# Patient Record
Sex: FEMALE | Race: WHITE | NOT HISPANIC OR LATINO | Employment: OTHER | URBAN - METROPOLITAN AREA
[De-identification: names, ages, dates, MRNs, and addresses within clinical notes are randomized per-mention and may not be internally consistent; named-entity substitution may affect disease eponyms.]

---

## 2018-01-23 ENCOUNTER — CONVERSION ENCOUNTER (OUTPATIENT)
Dept: GENERAL RADIOLOGY | Facility: HOSPITAL | Age: 58
End: 2018-01-23

## 2018-08-17 ENCOUNTER — OFFICE VISIT CONVERTED (OUTPATIENT)
Dept: ORTHOPEDIC SURGERY | Facility: CLINIC | Age: 58
End: 2018-08-17
Attending: ORTHOPAEDIC SURGERY

## 2018-08-31 ENCOUNTER — OFFICE VISIT CONVERTED (OUTPATIENT)
Dept: ORTHOPEDIC SURGERY | Facility: CLINIC | Age: 58
End: 2018-08-31
Attending: PHYSICIAN ASSISTANT

## 2018-09-12 ENCOUNTER — OFFICE VISIT CONVERTED (OUTPATIENT)
Dept: ORTHOPEDIC SURGERY | Facility: CLINIC | Age: 58
End: 2018-09-12
Attending: PHYSICIAN ASSISTANT

## 2020-01-22 ENCOUNTER — CONVERSION ENCOUNTER (OUTPATIENT)
Dept: SURGERY | Facility: CLINIC | Age: 60
End: 2020-01-22

## 2020-01-22 ENCOUNTER — OFFICE VISIT CONVERTED (OUTPATIENT)
Dept: SURGERY | Facility: CLINIC | Age: 60
End: 2020-01-22
Attending: SURGERY

## 2020-06-29 ENCOUNTER — HOSPITAL ENCOUNTER (OUTPATIENT)
Dept: LAB | Facility: HOSPITAL | Age: 60
Discharge: HOME | End: 2020-06-29
Attending: FAMILY MEDICINE

## 2020-06-29 LAB
ALBUMIN SERPL-MCNC: 4.1 G/DL (ref 3.5–5)
ALBUMIN/GLOB SERPL: 1.5 {RATIO} (ref 1.4–2.6)
ALP SERPL-CCNC: 78 U/L (ref 53–141)
ALT SERPL-CCNC: 39 U/L (ref 10–40)
ANION GAP SERPL CALC-SCNC: 15 MMOL/L (ref 8–19)
AST SERPL-CCNC: 33 U/L (ref 15–50)
BILIRUB SERPL-MCNC: 0.6 MG/DL (ref 0.2–1.3)
BUN SERPL-MCNC: 7 MG/DL (ref 5–25)
BUN/CREAT SERPL: 10 {RATIO} (ref 6–20)
CALCIUM SERPL-MCNC: 9.7 MG/DL (ref 8.7–10.4)
CHLORIDE SERPL-SCNC: 100 MMOL/L (ref 99–111)
CHOLEST SERPL-MCNC: 195 MG/DL (ref 107–200)
CHOLEST/HDLC SERPL: 2.5 {RATIO} (ref 3–6)
CONV CO2: 27 MMOL/L (ref 22–32)
CONV TOTAL PROTEIN: 6.8 G/DL (ref 6.3–8.2)
CREAT UR-MCNC: 0.67 MG/DL (ref 0.5–0.9)
GFR SERPLBLD BASED ON 1.73 SQ M-ARVRAT: >60 ML/MIN/{1.73_M2}
GLOBULIN UR ELPH-MCNC: 2.7 G/DL (ref 2–3.5)
GLUCOSE SERPL-MCNC: 86 MG/DL (ref 65–99)
HDLC SERPL-MCNC: 77 MG/DL (ref 40–60)
LDLC SERPL CALC-MCNC: 106 MG/DL (ref 70–100)
OSMOLALITY SERPL CALC.SUM OF ELEC: 283 MOSM/KG (ref 273–304)
POTASSIUM SERPL-SCNC: 4.1 MMOL/L (ref 3.5–5.3)
SODIUM SERPL-SCNC: 138 MMOL/L (ref 135–147)
TRIGL SERPL-MCNC: 59 MG/DL (ref 40–150)
VLDLC SERPL-MCNC: 12 MG/DL (ref 5–37)

## 2020-07-29 ENCOUNTER — HOSPITAL ENCOUNTER (OUTPATIENT)
Dept: URGENT CARE | Facility: CLINIC | Age: 60
Discharge: HOME | End: 2020-07-29

## 2020-07-31 LAB — SARS-COV-2 RNA SPEC QL NAA+PROBE: NOT DETECTED

## 2021-04-12 ENCOUNTER — HOSPITAL ENCOUNTER (OUTPATIENT)
Dept: VACCINE CLINIC | Facility: HOSPITAL | Age: 61
Discharge: HOME | End: 2021-04-12
Attending: INTERNAL MEDICINE

## 2021-05-03 ENCOUNTER — HOSPITAL ENCOUNTER (OUTPATIENT)
Dept: GENERAL RADIOLOGY | Facility: HOSPITAL | Age: 61
Discharge: HOME | End: 2021-05-03
Attending: FAMILY MEDICINE

## 2021-05-05 ENCOUNTER — OFFICE VISIT CONVERTED (OUTPATIENT)
Dept: FAMILY MEDICINE CLINIC | Facility: CLINIC | Age: 61
End: 2021-05-05
Attending: PHYSICIAN ASSISTANT

## 2021-05-05 ENCOUNTER — CONVERSION ENCOUNTER (OUTPATIENT)
Dept: FAMILY MEDICINE CLINIC | Facility: CLINIC | Age: 61
End: 2021-05-05

## 2021-05-05 LAB
AMPHET UR QL CFM: NEGATIVE
BARBITURATES UR QL: NEGATIVE
BENZODIAZ UR QL SCN: NEGATIVE
CONV AMP/METHAMP UR: NEGATIVE
CONV COCAINE, UR: NEGATIVE
MDMA UR QL SCN: NEGATIVE
METHADONE UR QL SCN: NEGATIVE
OPIATES UR QL SCN: NEGATIVE
OXYCODONE UR QL SCN: NEGATIVE
PCP UR QL: NEGATIVE
THC SERPLBLD CFM-MCNC: NEGATIVE NG/ML

## 2021-05-11 ENCOUNTER — HOSPITAL ENCOUNTER (OUTPATIENT)
Dept: MAMMOGRAPHY | Facility: HOSPITAL | Age: 61
Discharge: HOME | End: 2021-05-11
Attending: PHYSICIAN ASSISTANT

## 2021-05-14 ENCOUNTER — HOSPITAL ENCOUNTER (OUTPATIENT)
Dept: GENERAL RADIOLOGY | Facility: HOSPITAL | Age: 61
Discharge: HOME | End: 2021-05-14
Attending: PHYSICIAN ASSISTANT

## 2021-05-14 ENCOUNTER — HOSPITAL ENCOUNTER (OUTPATIENT)
Dept: LAB | Facility: HOSPITAL | Age: 61
Discharge: HOME | End: 2021-05-14
Attending: PHYSICIAN ASSISTANT

## 2021-05-14 LAB
25(OH)D3 SERPL-MCNC: 41.6 NG/ML (ref 30–100)
ALBUMIN SERPL-MCNC: 4.2 G/DL (ref 3.5–5)
ALBUMIN/GLOB SERPL: 1.1 {RATIO} (ref 1.4–2.6)
ALP SERPL-CCNC: 96 U/L (ref 53–141)
ALT SERPL-CCNC: 27 U/L (ref 10–40)
ANION GAP SERPL CALC-SCNC: 14 MMOL/L (ref 8–19)
AST SERPL-CCNC: 36 U/L (ref 15–50)
BASOPHILS # BLD AUTO: 0.05 10*3/UL (ref 0–0.2)
BASOPHILS NFR BLD AUTO: 1 % (ref 0–3)
BILIRUB SERPL-MCNC: 1.13 MG/DL (ref 0.2–1.3)
BUN SERPL-MCNC: 7 MG/DL (ref 5–25)
BUN/CREAT SERPL: 10 {RATIO} (ref 6–20)
CALCIUM SERPL-MCNC: 9.2 MG/DL (ref 8.7–10.4)
CHLORIDE SERPL-SCNC: 97 MMOL/L (ref 99–111)
CHOLEST SERPL-MCNC: 222 MG/DL (ref 107–200)
CHOLEST/HDLC SERPL: 2.1 {RATIO} (ref 3–6)
CONV ABS IMM GRAN: 0.01 10*3/UL (ref 0–0.2)
CONV CO2: 26 MMOL/L (ref 22–32)
CONV IMMATURE GRAN: 0.2 % (ref 0–1.8)
CONV TOTAL PROTEIN: 8 G/DL (ref 6.3–8.2)
CREAT UR-MCNC: 0.7 MG/DL (ref 0.5–0.9)
DEPRECATED RDW RBC AUTO: 48.4 FL (ref 36.4–46.3)
EOSINOPHIL # BLD AUTO: 0.05 10*3/UL (ref 0–0.7)
EOSINOPHIL # BLD AUTO: 1 % (ref 0–7)
ERYTHROCYTE [DISTWIDTH] IN BLOOD BY AUTOMATED COUNT: 14.4 % (ref 11.7–14.4)
GFR SERPLBLD BASED ON 1.73 SQ M-ARVRAT: >60 ML/MIN/{1.73_M2}
GLOBULIN UR ELPH-MCNC: 3.8 G/DL (ref 2–3.5)
GLUCOSE SERPL-MCNC: 79 MG/DL (ref 65–99)
HCT VFR BLD AUTO: 44.2 % (ref 37–47)
HDLC SERPL-MCNC: 108 MG/DL (ref 40–60)
HGB BLD-MCNC: 14.8 G/DL (ref 12–16)
LDLC SERPL CALC-MCNC: 101 MG/DL (ref 70–100)
LYMPHOCYTES # BLD AUTO: 1.88 10*3/UL (ref 1–5)
LYMPHOCYTES NFR BLD AUTO: 37.2 % (ref 20–45)
MCH RBC QN AUTO: 30.6 PG (ref 27–31)
MCHC RBC AUTO-ENTMCNC: 33.5 G/DL (ref 33–37)
MCV RBC AUTO: 91.5 FL (ref 81–99)
MONOCYTES # BLD AUTO: 0.73 10*3/UL (ref 0.2–1.2)
MONOCYTES NFR BLD AUTO: 14.5 % (ref 3–10)
NEUTROPHILS # BLD AUTO: 2.33 10*3/UL (ref 2–8)
NEUTROPHILS NFR BLD AUTO: 46.1 % (ref 30–85)
NRBC CBCN: 0 % (ref 0–0.7)
OSMOLALITY SERPL CALC.SUM OF ELEC: 273 MOSM/KG (ref 273–304)
PLATELET # BLD AUTO: 160 10*3/UL (ref 130–400)
PMV BLD AUTO: 12.8 FL (ref 9.4–12.3)
POTASSIUM SERPL-SCNC: 3.9 MMOL/L (ref 3.5–5.3)
RBC # BLD AUTO: 4.83 10*6/UL (ref 4.2–5.4)
SODIUM SERPL-SCNC: 133 MMOL/L (ref 135–147)
TRIGL SERPL-MCNC: 63 MG/DL (ref 40–150)
TSH SERPL-ACNC: 1.33 M[IU]/L (ref 0.27–4.2)
VLDLC SERPL-MCNC: 13 MG/DL (ref 5–37)
WBC # BLD AUTO: 5.05 10*3/UL (ref 4.8–10.8)

## 2021-05-15 VITALS — WEIGHT: 140.37 LBS | HEIGHT: 67 IN | BODY MASS INDEX: 22.03 KG/M2 | RESPIRATION RATE: 12 BRPM

## 2021-05-16 VITALS — BODY MASS INDEX: 22.6 KG/M2 | HEART RATE: 76 BPM | HEIGHT: 67 IN | OXYGEN SATURATION: 98 % | WEIGHT: 144 LBS

## 2021-05-16 VITALS — BODY MASS INDEX: 22.6 KG/M2 | WEIGHT: 144 LBS | OXYGEN SATURATION: 95 % | HEART RATE: 62 BPM | HEIGHT: 67 IN

## 2021-05-16 VITALS — OXYGEN SATURATION: 97 % | HEIGHT: 67 IN | WEIGHT: 142.5 LBS | BODY MASS INDEX: 22.37 KG/M2 | HEART RATE: 43 BPM

## 2021-05-22 ENCOUNTER — TRANSCRIBE ORDERS (OUTPATIENT)
Dept: FAMILY MEDICINE CLINIC | Facility: CLINIC | Age: 61
End: 2021-05-22

## 2021-05-22 DIAGNOSIS — Z78.0 POST-MENOPAUSAL: Primary | ICD-10-CM

## 2021-06-05 NOTE — H&P
"   History and Physical      Patient Name: Danielle Manuel   Patient ID: [id number]   Sex: Female   YOB: 1960    Primary Care Provider: Phyllis LOCK   Referring Provider: Phyllis LOCK    Visit Date: May 5, 2021    Provider: HAYDE Sprague   [place] [facility] [place] [address] [place] [address], [address]  [place]  [id number]   Location Phone: [phone]          Chief Complaint  · New Patient-Establish Care   · Medication Refills       History Of Present Illness  Danielle Manuel is a 60 year old /White female who presents for evaluation and treatment of:      New patient to establish care, previous PCP Dr Pratt     Anxiety/Depression: She takes Celexa 40mg qd with good results, she also has Ativan and Klonopin prn. She were given both by Dr. Pratt. She takes Klonopin for \"bad\" night; feels lonely, difficulty sleeping; lives alone,   about 10 years ago and she is still struggling. She states she is a very anxious person     HL: She is taking Simvastatin 20mg qd and states she is due for labs. Her last set was 2020. She denies leg cramps/pain and muscle weakness. She ran out of meds last 2020 and has not had medication since.    Insomnia: She states she has Trazodone prn and states it works well.    Chronic Pain: She states she has Tramadol 50mg for her wide spread pain due to a lot of broken bones over her lifetime. Tried NSAIDs and sometimes works but occasionally doesn't. States that she has back pain but has never had evaluation.    OAB: on Myrbetriq; doing well without complaints    MM/3/21  no recent DEXA; has had several bone fractured (moriah wrist, elbow, ankle); took Fosamax but didn't like it and won't take.  Cologuard: 2019 and normal.           Past Medical History  Disease Name Date Onset Notes   Hyperlipemia --  --          Past Surgical History  Procedure Name Date Notes "   Appendectomy --  --    Colonoscopy --  --    Hysterectomy --  --    Metal implants --  --    Tonsilectomy --  --          Medication List  Name Date Started Instructions   Ativan 0.5 mg oral tablet  take 1 tablet by oral route As needed   Celexa 40 mg oral tablet  take 1 tablet (40 mg) by oral route once daily   Klonopin 2 mg oral tablet  take 1 tablet by oral route As needed   Myrbetriq 50 mg oral tablet extended release 24 hr  take 1 tablet (50 mg) by oral route once daily swallowing whole with water. Do not crush, chew and/or divide.   simvastatin 20 mg oral tablet  take 1 tablet (20 mg) by oral route once daily in the evening   tramadol 50 mg oral tablet  take 1 tablet by oral route As needed   trazodone 50 mg oral tablet  take 1 tablet (50 mg) by oral route once daily at bedtime         Allergy List  Allergen Name Date Reaction Notes   PENICILLINS --  --  --        Allergies Reconciled  Family Medical History  Disease Name Relative/Age Notes   Family history of certain chronic disabling diseases; arthritis Mother/   Mother   Family history of pancreatic cancer  --    Family history of heart disease Father/   --    Family history of diabetes mellitus  --          Social History  Finding Status Start/Stop Quantity Notes   Alcohol Use Current some day --/-- --  occasionally drinks, less than 1 drink per day, has been drinking for 21-30 years   lives alone --  --/-- --  --    Recreational Drug Use Never --/-- --  no   Tobacco Current every day --/-- 1 PPD current every day smoker, 1 packs per day, smoked 21-30 years    --  --/-- --  --    Working --  --/-- --  --          Review of Systems  · Constitutional  o Denies  o : fatigue, night sweats  · Eyes  o Denies  o : double vision, blurred vision  · HENT  o Denies  o : vertigo, recent head injury  · Breasts  o Denies  o : abnormal changes in breast size, additional breast symptoms except as noted in the HPI  · Cardiovascular  o Denies  o : chest pain,  "irregular heart beats  · Respiratory  o Denies  o : shortness of breath, productive cough  · Gastrointestinal  o Denies  o : nausea, vomiting  · Genitourinary  o Denies  o : dysuria, urinary retention  · Integument  o Denies  o : hair growth change, new skin lesions  · Neurologic  o Denies  o : altered mental status, seizures  · Musculoskeletal  o Denies  o : joint swelling, limitation of motion  · Endocrine  o Denies  o : cold intolerance, heat intolerance  · Psychiatric  o Admits  o : anxiety  · Heme-Lymph  o Denies  o : petechiae, lymph node enlargement or tenderness  · Allergic-Immunologic  o Denies  o : frequent illnesses      Vitals  Date Time BP Position Site L\R Cuff Size HR RR TEMP (F) WT  HT  BMI kg/m2 BSA m2 O2 Sat FR L/min FiO2        05/05/2021 10:32 /66 Sitting    72 - R  97 146lbs 16oz 5'  6\" 23.73 1.76 96 %  21%          Physical Examination  · Constitutional  o Appearance  o : well developed, well-nourished, no acute distress  · Head and Face  o Head  o : normocephalic, atraumatic  · Neck  o Inspection/Palpation  o : normal appearance, no masses or tenderness, trachea midline  o Thyroid  o : gland size normal, nontender, no nodules or masses present on palpation  · Respiratory  o Respiratory Effort  o : breathing unlabored  o Inspection of Chest  o : chest rise symmetric bilaterally  o Auscultation of Lungs  o : clear to auscultation bilaterally throughout inspiration and expiration  · Cardiovascular  o Heart  o :   § Auscultation of Heart  § : regular rate and rhythm, no murmurs, gallops or rubs  o Peripheral Vascular System  o :   § Extremities  § : no edema  · Lymphatic  o Neck  o : no cervical lymphadenopathy, no supraclavicular lymphadenopathy  · Psychiatric  o Mood and Affect  o : mood normal, affect appropriate          Results  · In-Office Procedures  o Lab procedure  § IOP - Urine Drug Screen In-House [facility] (41635)   § Amphetamines Ur Ql: Negative   § Barbiturates Ur Ql: Negative "   § Buprenorphine+Nor Ur Ql Scn: Negative   § Benzodiaz Ur Ql: Negative   § Cocaine Ur Ql: Negative   § Methadone Ur Ql: Negative   § Methamphet Ur Ql: Negative   § MDMA Ur Ql Scn: Negative   § Opiates Ur Ql: Negative   § Oxycodone Ur Ql: Negative   § PCP Ur Ql: Negative   § THC Ur Ql: Negative   § Temp in Range?: Within/Acceptable   § Control Seen?: Yes       Assessment  · Screening for depression     V79.0/Z13.89  · Post menopausal syndrome     V49.81/Z78.0  · Anxiety disorder     300.00/F41.9  · Hyperlipidemia     272.4/E78.5  · Insomnia, unspecified     780.52/G47.00  · Vitamin D deficiency     268.9/E55.9  · Back pain     724.5/M54.9  · Medication management     V58.69/Z79.899  · Screening for osteoporosis     V82.81/Z13.820  · Patient receiving medication management services from refill clinic     V68.1/Z76.0  · Establishing care with new doctor, encounter for     V65.8/Z76.89  · Chronic pain     338.29/G89.29  · OAB (overactive bladder)     596.51/N32.81       Order dexa.  Ayla and UDS completed and reviewed.  Discussed decreasing Klonopin to 1mg qhs prn; will task Dr. Nolen for refill.  Continue prn Tramadol; will task Dr. Nolen for refill.  F/u 3 mths.     Problems Reconciled  Plan  · Orders  o DEXA Bone Density, 1 or more sites, axial skeleton [facility] (44556) - V49.81/Z78.0 - 05/05/2021  o Physical, Primary Care Panel (CBC, CMP, Lipid, TSH) [facility] (05412, 96934, 46362, 43452) - 272.4/E78.5, 268.9/E55.9, V58.69/Z79.899 - 05/05/2021  o Vitamin D (25-Hydroxy) Level (07879) - 268.9/E55.9 - 05/05/2021  o AYLA Report (KASPR) - - 05/05/2021  o ACO-18: Positive screen for clinical depression using a standardized tool and a follow-up plan documented () - - 05/05/2021   Patient states stable on medication.  o ACO-20: Screening Mammography documented and reviewed [facility] () - - 05/05/2021  o ACO-39: Current medications updated and reviewed (1159F, ) - - 05/05/2021  o Thoracic Spine (3 view) [facility] Preferred  View (75892) - 724.5/M54.9 - 05/05/2021  · Medications  o Celexa 40 mg oral tablet   SIG: take 1 tablet (40 mg) by oral route once daily for 90 days   DISP: (90) Tablet with 1 refills  Prescribed on 05/05/2021     o Myrbetriq 50 mg oral tablet extended release 24 hr   SIG: take 1 tablet (50 mg) by oral route once daily swallowing whole with water. Do not crush, chew and/or divide. for 90 days   DISP: (90) Tablet with 1 refills  Prescribed on 05/05/2021     o simvastatin 20 mg oral tablet   SIG: take 1 tablet (20 mg) by oral route once daily in the evening for 90 days   DISP: (90) Tablet with 1 refills  Prescribed on 05/05/2021     o tramadol 50 mg oral tablet   SIG: take 1 tablet by oral route daily as needed for 30 days   DISP: (30) Tablet with 2 refills  Prescribed on 05/05/2021     o trazodone 50 mg oral tablet   SIG: take 1 tablet (50 mg) by oral route once daily at bedtime for 90 days   DISP: (90) Tablet with 0 refills  Prescribed on 05/05/2021     o Klonopin 2 mg oral tablet   SIG: take 1 tablet by oral route once a day (in the evening) as needed for 30 days   DISP: (30) Tablet with 2 refills  Adjusted on 05/05/2021     o Ativan 0.5 mg oral tablet   SIG: take 1 tablet by oral route As needed   DISP: (0) Tablet with 0 refills  Discontinued on 05/05/2021     o Medications have been Reconciled  o Transition of Care or Provider Policy  · Instructions  o Depression Screen completed and scanned into the EMR under the designated folder within the patient's documents.  o Today's PHQ-9 result is 7  o Stop taking calcium supplements for at least 48 hours prior to your exam. Failure to stop supplements could alter results, and the radiologists will require you to reschedule your test.  o Patient was given an SSRI/SSNRI medication and warned of possible side effects of the medication including potential for increased risk of suicidal thoughts and feelings. Patient was instructed that if they begin to exhibit any of these  effects they will discontinue the medication immediately and contact our office or the ER ASAP.  o Advised that cheeses and other sources of dairy fats, animal fats, fast food, and the extras (candy, pastries, pies, doughnuts and cookies) all contain LDL raising nutrients. Advised to increase fruits, vegetables, whole grains, and to monitor portion sizes.   o Avoid any electronic use for at least 30 minutes prior to bed time. Cell phone screens, tablets and TVs imitate daylight, so your brain can become confused on the time of day. No caffeine use in the late afternoon and evenings.  o Obtained a written consent for AYLA query. Discussed the risk and benefits of the use of controlled substances with the patient, including the risk of tolerance and drug dependence. The patient has been counseled on the need to have an exit strategy, including potentially discontinuing the use of controlled substances. AYLA has or will be reviewed as soon as it becomes avaliable.  o See note for indication of controlled substance. Ayla and drug screen have been reviewed. Controlled substance agreement signed and scanned into chart. After discussion of risks and benefits of medication, I have determined patient is suitable for Rx while demonstrating the ability to safely follow and administer the medication plan. Patient understands the expectation that medication directions cannot be adjusted without a provider's written approval.   o Patient is taking medications as prescribed and doing well.   o Patient was educated/instructed on their diagnosis, treatment and medications prior to discharge from the clinic today.  o Call the office with any concerns or questions.  o Electronically Identified Patient Education Materials Provided Electronically  · Disposition  o Follow Up 3 months.  · Associate Tasks  o Task ID [id number] ''''Provider to Provider ONLY Controlled Substance Request: Tramadol and Klonopin  Refill            Electronically Signed by: HAYDE Sprague -Author on May 5, 2021 04:02:24 PM

## 2021-06-07 ENCOUNTER — APPOINTMENT (OUTPATIENT)
Dept: BONE DENSITY | Facility: HOSPITAL | Age: 61
End: 2021-06-07

## 2021-06-16 ENCOUNTER — TELEPHONE (OUTPATIENT)
Dept: NEUROSURGERY | Facility: CLINIC | Age: 61
End: 2021-06-16

## 2021-06-16 NOTE — TELEPHONE ENCOUNTER
----- Message from DREW Chau sent at 6/15/2021  5:35 PM EDT -----  Regarding: RE: New Referral  Please find out how old the fracture is or when the XR was done. Priya Flynn  ----- Message -----  From: Purnima Ventura RegSched Rep  Sent: 6/15/2021   3:45 PM EDT  To: DREW Chau  Subject: New Referral                                     We received a referral in  on this patient.  Dx is compression fx.  Pt had x-ray but no MRI/CT.  Ok to schedule or do you prefer pt has scan prior?

## 2021-06-18 ENCOUNTER — TELEPHONE (OUTPATIENT)
Dept: NEUROSURGERY | Facility: CLINIC | Age: 61
End: 2021-06-18

## 2021-07-15 VITALS
DIASTOLIC BLOOD PRESSURE: 66 MMHG | BODY MASS INDEX: 23.63 KG/M2 | SYSTOLIC BLOOD PRESSURE: 110 MMHG | OXYGEN SATURATION: 96 % | HEIGHT: 66 IN | TEMPERATURE: 97 F | WEIGHT: 147 LBS | HEART RATE: 72 BPM

## 2021-07-26 ENCOUNTER — OFFICE VISIT (OUTPATIENT)
Dept: NEUROSURGERY | Facility: CLINIC | Age: 61
End: 2021-07-26

## 2021-07-26 VITALS
TEMPERATURE: 97.3 F | WEIGHT: 138.9 LBS | HEIGHT: 66 IN | BODY MASS INDEX: 22.32 KG/M2 | HEART RATE: 98 BPM | OXYGEN SATURATION: 100 % | SYSTOLIC BLOOD PRESSURE: 128 MMHG | DIASTOLIC BLOOD PRESSURE: 89 MMHG

## 2021-07-26 DIAGNOSIS — S22.000A THORACIC COMPRESSION FRACTURE, CLOSED, INITIAL ENCOUNTER (HCC): Primary | ICD-10-CM

## 2021-07-26 DIAGNOSIS — M47.24 THORACIC RADICULOPATHY DUE TO OSTEOARTHRITIS OF SPINE: ICD-10-CM

## 2021-07-26 DIAGNOSIS — M80.08XG PATHOLOGICAL FRACTURE OF VERTEBRA DUE TO AGE-RELATED OSTEOPOROSIS WITH DELAYED HEALING, SUBSEQUENT ENCOUNTER: ICD-10-CM

## 2021-07-26 PROCEDURE — 99204 OFFICE O/P NEW MOD 45 MIN: CPT | Performed by: NURSE PRACTITIONER

## 2021-07-26 RX ORDER — CHLORAL HYDRATE 500 MG
1000 CAPSULE ORAL
COMMUNITY

## 2021-07-26 RX ORDER — TRAZODONE HYDROCHLORIDE 50 MG/1
TABLET ORAL
COMMUNITY
Start: 2021-07-11 | End: 2021-08-04 | Stop reason: SDUPTHER

## 2021-07-26 RX ORDER — GABAPENTIN 300 MG/1
300 CAPSULE ORAL 3 TIMES DAILY
Qty: 90 CAPSULE | Refills: 1 | Status: SHIPPED | OUTPATIENT
Start: 2021-07-26

## 2021-07-26 RX ORDER — ALENDRONATE SODIUM 70 MG/1
TABLET ORAL
COMMUNITY
Start: 2021-07-25 | End: 2021-10-15 | Stop reason: SDUPTHER

## 2021-07-26 RX ORDER — CLOBETASOL PROPIONATE 0.5 MG/G
OINTMENT TOPICAL
COMMUNITY
Start: 2021-07-14 | End: 2021-10-15

## 2021-07-26 RX ORDER — SIMVASTATIN 20 MG
TABLET ORAL
COMMUNITY
Start: 2021-07-11 | End: 2021-08-04 | Stop reason: SDUPTHER

## 2021-07-26 RX ORDER — CITALOPRAM 40 MG/1
TABLET ORAL
COMMUNITY
Start: 2021-07-11 | End: 2021-08-04 | Stop reason: SDUPTHER

## 2021-07-26 RX ORDER — CLONAZEPAM 2 MG/1
1 TABLET ORAL NIGHTLY PRN
COMMUNITY
Start: 2021-05-06 | End: 2021-08-05 | Stop reason: SDUPTHER

## 2021-07-26 RX ORDER — TRAMADOL HYDROCHLORIDE 50 MG/1
TABLET ORAL
COMMUNITY
Start: 2021-05-06 | End: 2021-09-09 | Stop reason: SDUPTHER

## 2021-07-26 RX ORDER — IBUPROFEN 200 MG
1 CAPSULE ORAL DAILY PRN
COMMUNITY

## 2021-07-26 NOTE — PATIENT INSTRUCTIONS
-MRI Thoracic Spine   -Gabapentin 300 mg start at bedtime x3-4 days, then increase to twice daily x3-4 days, then three times daily  -Salonpas patch to thoracic territory  -TLSO brace when out of bed, ok to take off to shower and at night  -No lifting greater than 10 lb, limit bending and twisting at the waist  -Follow up in 6 weeks

## 2021-07-26 NOTE — PROGRESS NOTES
"Chief Complaint  Back Pain (T9 compression fracture)    Subjective          Danielle Manuel who is a 61 y.o. year old female who presents to [facility] for evaluation of mid back pain.     Pt fell on the ice in February of this year. Initially had mild back pain, though not particularly bothersome. She was not evaluated at that time. Her pain started progressing and becoming more severe in the mid back radiating around into the chest and into the scapular territory bilaterally. She had thoracic spine XR in May showing a T9 compression fracture with 50% loss of height. She rates her pain as severe 10/10. She takes Tramadol for her pain, reports that she is supposed to take this three times a day, though she has been taking this 7-8 times per day without relief. Her pain is worse when sitting for long periods of time and when laying on her back. Walking seems to help her pain. She describes the pain as burning, tingling, throbbing.     She also recently had a bone density study, demonstrating osteoporosis. She recently started Fosamax per her PCP.       Recent Interventions: None      Review of Systems   Constitutional: Positive for fatigue.   Musculoskeletal: Positive for arthralgias, back pain, gait problem, myalgias, neck pain and neck stiffness.   Neurological: Positive for weakness, numbness and headache.   All other systems reviewed and are negative.       Objective   Vital Signs:   /89   Pulse 98   Temp 97.3 °F (36.3 °C)   Ht 167.6 cm (66\")   Wt 63 kg (138 lb 14.4 oz)   SpO2 100%   BMI 22.42 kg/m²       Physical Exam  Vitals reviewed.   Constitutional:       Appearance: Normal appearance.   Musculoskeletal:      Thoracic back: Tenderness present. Scoliosis present.   Neurological:      Mental Status: She is alert and oriented to person, place, and time.      Gait: Gait is intact.      Deep Tendon Reflexes: Strength normal.      Reflex Scores:       Tricep " reflexes are 1+ on the right side and 1+ on the left side.       Bicep reflexes are 1+ on the right side and 1+ on the left side.       Brachioradialis reflexes are 2+ on the right side and 2+ on the left side.       Patellar reflexes are 1+ on the right side and 1+ on the left side.       Achilles reflexes are 2+ on the right side and 2+ on the left side.       Neurologic Exam     Mental Status   Oriented to person, place, and time.   Level of consciousness: alert    Motor Exam   Muscle bulk: normal  Overall muscle tone: normal    Strength   Strength 5/5 throughout.     Sensory Exam   Light touch normal.     Gait, Coordination, and Reflexes     Gait  Gait: normal    Reflexes   Right brachioradialis: 2+  Left brachioradialis: 2+  Right biceps: 1+  Left biceps: 1+  Right triceps: 1+  Left triceps: 1+  Right patellar: 1+  Left patellar: 1+  Right achilles: 2+  Left achilles: 2+  Right Steinberg: absent  Left Steinberg: absent  Right ankle clonus: absent  Left ankle clonus: absent       Result Review :       Data reviewed: Radiologic studies I personally reviewed her XR T Spine, showing kyphosis with a T9 compression fracture with 50% loss of height.           Assessment and Plan    Diagnoses and all orders for this visit:    1. Thoracic compression fracture, closed, initial encounter (CMS/formerly [facility]) (Primary)  -     gabapentin (NEURONTIN) 300 MG capsule; Take 1 capsule by mouth 3 (Three) Times a Day.  Dispense: 90 capsule; Refill: 1  -     MRI Thoracic Spine Without Contrast; Future    2. Thoracic radiculopathy due to osteoarthritis of spine  -     gabapentin (NEURONTIN) 300 MG capsule; Take 1 capsule by mouth 3 (Three) Times a Day.  Dispense: 90 capsule; Refill: 1  -     MRI Thoracic Spine Without Contrast; Future    3. Pathological fracture of vertebra due to age-related osteoporosis with delayed healing, subsequent encounter    Pt with osteoporosis, presenting with worsening mid back pain following a fall in February of this  year. XR Thoracic Spine shows T9 compression fracture. Will proceed with MRI Thoracic spine to stage her fracture and evaluate healing. She is having radiating pain symptoms in this territory. Her exam does not show evidence of myelopathy. Will evaluate for foraminal narrowing for consideration of injections in this territory. For her pain, I will order TLSO brace for support. Will start Gabapentin titrated to 300 mg three times daily. We discussed utilizing salonpas patches in this area. Mobility restrictions have been given. She will follow up in 6 weeks.       Follow Up   Return in about 6 weeks (around 9/6/2021).  Patient was given instructions and counseling regarding her condition or for health maintenance advice.     -MRI Thoracic Spine   -Gabapentin 300 mg start at bedtime x3-4 days, then increase to twice daily x3-4 days, then three times daily  -Salonpas patch to thoracic territory  -TLSO brace when out of bed, ok to take off to shower and at night  -No lifting greater than 10 lb, limit bending and twisting at the waist  -Follow up in 6 weeks

## 2021-07-29 ENCOUNTER — TELEPHONE (OUTPATIENT)
Dept: NEUROSURGERY | Facility: CLINIC | Age: 61
End: 2021-07-29

## 2021-07-29 NOTE — TELEPHONE ENCOUNTER
Caller: Danielle Manuel    Relationship: Self    Best call back number: [phone]      What was the call regarding:     PATIENT STATES THAT HER COST AFTER INSURANCE FROM THE ORDERED MRI WOULD $2000 at [place], AND AS SHE IS UNEMPLOYED SHE WOULD NOT BE ABLE TO AFFORD THAT.     SHE WOULD LIKE TO LET LEIA HERNANDEZ KNOW THAT SHE WILL NOT BE GETTING THE MRI.

## 2021-08-02 PROBLEM — N95.1 POST MENOPAUSAL SYNDROME: Status: ACTIVE | Noted: 2021-05-05

## 2021-08-02 PROBLEM — F41.9 ANXIETY DISORDER: Status: ACTIVE | Noted: 2021-05-05

## 2021-08-02 PROBLEM — G89.29 CHRONIC PAIN: Status: ACTIVE | Noted: 2021-05-05

## 2021-08-02 PROBLEM — G47.00 INSOMNIA, UNSPECIFIED: Status: ACTIVE | Noted: 2021-05-05

## 2021-08-02 PROBLEM — E78.5 HYPERLIPIDEMIA: Status: ACTIVE | Noted: 2021-05-05

## 2021-08-02 PROBLEM — E55.9 VITAMIN D DEFICIENCY: Status: ACTIVE | Noted: 2021-05-05

## 2021-08-04 ENCOUNTER — OFFICE VISIT (OUTPATIENT)
Dept: FAMILY MEDICINE CLINIC | Facility: CLINIC | Age: 61
End: 2021-08-04

## 2021-08-04 VITALS
HEIGHT: 66 IN | BODY MASS INDEX: 22.43 KG/M2 | OXYGEN SATURATION: 99 % | WEIGHT: 139.6 LBS | HEART RATE: 72 BPM | TEMPERATURE: 97.9 F | SYSTOLIC BLOOD PRESSURE: 109 MMHG | DIASTOLIC BLOOD PRESSURE: 84 MMHG

## 2021-08-04 DIAGNOSIS — F41.9 ANXIETY DISORDER, UNSPECIFIED TYPE: Chronic | ICD-10-CM

## 2021-08-04 DIAGNOSIS — E78.5 HYPERLIPIDEMIA, UNSPECIFIED HYPERLIPIDEMIA TYPE: Chronic | ICD-10-CM

## 2021-08-04 DIAGNOSIS — G47.00 INSOMNIA, UNSPECIFIED TYPE: Chronic | ICD-10-CM

## 2021-08-04 DIAGNOSIS — G89.29 OTHER CHRONIC PAIN: ICD-10-CM

## 2021-08-04 DIAGNOSIS — Z79.899 LONG TERM USE OF DRUG: ICD-10-CM

## 2021-08-04 DIAGNOSIS — E87.1 HYPONATREMIA: Primary | ICD-10-CM

## 2021-08-04 PROBLEM — M80.00XD AGE-RELATED OSTEOPOROSIS WITH CURRENT PATHOLOGICAL FRACTURE WITH ROUTINE HEALING: Status: ACTIVE | Noted: 2021-08-04

## 2021-08-04 PROCEDURE — 99214 OFFICE O/P EST MOD 30 MIN: CPT | Performed by: PHYSICIAN ASSISTANT

## 2021-08-04 PROCEDURE — 80305 DRUG TEST PRSMV DIR OPT OBS: CPT | Performed by: PHYSICIAN ASSISTANT

## 2021-08-04 RX ORDER — SIMVASTATIN 20 MG
20 TABLET ORAL NIGHTLY
Qty: 90 TABLET | Refills: 1 | Status: SHIPPED | OUTPATIENT
Start: 2021-08-04 | End: 2021-09-14

## 2021-08-04 RX ORDER — TRAZODONE HYDROCHLORIDE 50 MG/1
50 TABLET ORAL NIGHTLY
Qty: 90 TABLET | Refills: 1 | Status: SHIPPED | OUTPATIENT
Start: 2021-08-04 | End: 2021-09-09 | Stop reason: SDUPTHER

## 2021-08-04 RX ORDER — CITALOPRAM 40 MG/1
40 TABLET ORAL DAILY
Qty: 90 TABLET | Refills: 1 | Status: SHIPPED | OUTPATIENT
Start: 2021-08-04 | End: 2021-09-09 | Stop reason: SDUPTHER

## 2021-08-04 NOTE — PROGRESS NOTES
Chief Complaint  Chief Complaint   Patient presents with   • Hyperlipidemia     follow up medication refill    • Insomnia     follow up medication refill        Subjective          Danielle Manuel presents to [facility]  History of Present Illness   Patient is here today for follow up and medication refills     Anxiety: Patient is currently on Celexa and also takes Klonopin for anxiety and doing well. Patient states that symptoms are well controlled.    HL: Patient presents for management of hyperlipidemia. Patient is currently on Simvastatin. Patient denies muscle cramps and muscle weakness. Patient is monitoring diet to help lower lipids.    Insomnia: Patient presents for management of insomnia. Patient is currently on Trazodone and doing well. Patient is sleeping well without adverse effects.    Chronic Pain: She is taking Tramadol for her wide spread pain. She is also taking Gabapentin which is given by another provider she seen for her back. She is currently being treated by neurosurgery for a compression fracture in T-spine. Put on gabapentin, bracing,     Labs in 5/2021; low sodium and low chloride.    Also seeing derm specialist for a skin condition; Dr. Naik is checking for Lupus but there are no results back at present.      Medical History: has a past medical history of Anxiety disorder (05/05/2021), Cancer (CMS/[facility]) (01/01/2018), Chronic pain (05/05/2021), Claustrophobia (01/01/1990), CTS (carpal tunnel syndrome) (01/01/1990), Hyperlipemia, Hyperlipidemia (05/05/2021), Insomnia, unspecified (05/05/2021), Low back pain (02/01/2021), MRSA (methicillin resistant Staphylococcus aureus) (06/01/2021), Post menopausal syndrome (05/05/2021), Thoracic disc disorder (02/01/2021), and Vitamin D deficiency (05/05/2021).   Surgical History: has a past surgical history that includes Appendectomy; Colonoscopy; Hysterectomy; Tonsilectomy, adenoidectomy, bilateral myringotomy and  "tubes; and Carpal tunnel release (03/01/1990).   Family History: family history includes Alcohol abuse in her father; Anxiety disorder in her mother; Arthritis in her mother; COPD in her paternal uncle; Cancer in her maternal grandfather, maternal grandmother, and maternal uncle; Depression in her son; Diabetes in her brother and another family member; Early death in her father and maternal grandfather; Hearing loss in her mother; Heart disease in her father and paternal uncle; Hyperlipidemia in her brother, mother, and paternal uncle; Pancreatic cancer in an other family member.   Social History: reports that she has been smoking cigarettes. She started smoking about 35 years ago. She has a 35.00 pack-year smoking history. She has never used smokeless tobacco. She reports current alcohol use of about 5.0 standard drinks of alcohol per week. She reports that she does not use drugs.    Allergies: Penicillins    Health Maintenance Due   Topic Date Due   • COLORECTAL CANCER SCREENING  Never done   • ANNUAL PHYSICAL  Never done   • Pneumococcal Vaccine 0-64 (1 of 2 - PPSV23) Never done   • TDAP/TD VACCINES (1 - Tdap) Never done   • ZOSTER VACCINE (1 of 2) Never done   • LUNG CANCER SCREENING  Never done   • COVID-19 Vaccine (2 - Pfizer 2-dose series) 05/03/2021   • HEPATITIS C SCREENING  Never done         There is no immunization history on file for this patient.    Objective     Vitals:    08/04/21 1054   BP: 109/84   Pulse: 72   Temp: 97.9 °F (36.6 °C)   TempSrc: Temporal   SpO2: 99%   Weight: 63.3 kg (139 lb 9.6 oz)   Height: 167.6 cm (66\")     Body mass index is 22.53 kg/m².       Physical Exam  Vitals and nursing note reviewed.   Constitutional:       Appearance: Normal appearance.   HENT:      Head: Normocephalic and atraumatic.   Neck:      Vascular: No carotid bruit.      Comments: Thyroid : gland size normal, nontender, no nodules or masses present on palpation   Cardiovascular:      Rate and Rhythm: Normal " rate and regular rhythm.      Pulses: Normal pulses.      Heart sounds: Normal heart sounds.   Pulmonary:      Effort: Pulmonary effort is normal.      Breath sounds: Normal breath sounds.   Musculoskeletal:      Cervical back: Neck supple. No tenderness.      Right lower leg: No edema.      Left lower leg: No edema.   Lymphadenopathy:      Cervical: No cervical adenopathy.   Neurological:      Mental Status: She is alert.   Psychiatric:         Mood and Affect: Mood normal.         Behavior: Behavior normal.           Result Review :                           Assessment and Plan      Diagnoses and all orders for this visit:    1. Hyponatremia (Primary)  Comments:  Recheck labs.  Orders:  -     Comprehensive metabolic panel; Future    2. Anxiety disorder, unspecified type  Comments:  Stable; UDS and AYLA reviewed and appropriate. Refill Klonopin 1mg daily prn #30/0. Refill Celexa . Follow up in 3mths.  Orders:  -     citalopram (CeleXA) 40 MG tablet; Take 1 tablet by mouth Daily.  Dispense: 90 tablet; Refill: 1  -     clonazePAM (KlonoPIN) 1 MG tablet; Take 1 tablet by mouth At Night As Needed for Anxiety.  Dispense: 30 tablet; Refill: 0    3. Other chronic pain  Comments:  Referral to pain management.  Orders:  -     Ambulatory Referral to Pain Management    4. Insomnia, unspecified type  Comments:  Stable; continue current medication and f/u in 3mths.  Orders:  -     traZODone (DESYREL) 50 MG tablet; Take 1 tablet by mouth Every Night.  Dispense: 90 tablet; Refill: 1    5. Hyperlipidemia, unspecified hyperlipidemia type  Comments:  Stable; continue current medication and f/u in 3mths. Labs last checked 5.2021  Orders:  -     simvastatin (ZOCOR) 20 MG tablet; Take 1 tablet by mouth Every Night.  Dispense: 90 tablet; Refill: 1    6. Long term use of drug  -     POC Urine Drug Screen [facility]            Follow Up     Return in about 3 months (around 11/4/2021).    Patient was given instructions and  counseling regarding her condition or for health maintenance advice. Please see specific information pulled into the AVS if appropriate.

## 2021-08-05 RX ORDER — CLONAZEPAM 1 MG/1
1 TABLET ORAL NIGHTLY PRN
Qty: 30 TABLET | Refills: 0 | Status: SHIPPED | OUTPATIENT
Start: 2021-08-05 | End: 2021-09-09 | Stop reason: SDUPTHER

## 2021-08-06 ENCOUNTER — LAB (OUTPATIENT)
Dept: LAB | Facility: HOSPITAL | Age: 61
End: 2021-08-06

## 2021-08-06 DIAGNOSIS — E87.1 HYPONATREMIA: ICD-10-CM

## 2021-08-06 LAB
ALBUMIN SERPL-MCNC: 4 G/DL (ref 3.5–5.2)
ALBUMIN/GLOB SERPL: 1.2 G/DL
ALP SERPL-CCNC: 76 U/L (ref 39–117)
ALT SERPL W P-5'-P-CCNC: 17 U/L (ref 1–33)
ANION GAP SERPL CALCULATED.3IONS-SCNC: 8.8 MMOL/L (ref 5–15)
AST SERPL-CCNC: 24 U/L (ref 1–32)
BILIRUB SERPL-MCNC: 0.8 MG/DL (ref 0–1.2)
BUN SERPL-MCNC: 9 MG/DL (ref 8–23)
BUN/CREAT SERPL: 12.7 (ref 7–25)
CALCIUM SPEC-SCNC: 9 MG/DL (ref 8.6–10.5)
CHLORIDE SERPL-SCNC: 100 MMOL/L (ref 98–107)
CO2 SERPL-SCNC: 28.2 MMOL/L (ref 22–29)
CREAT SERPL-MCNC: 0.71 MG/DL (ref 0.57–1)
GFR SERPL CREATININE-BSD FRML MDRD: 84 ML/MIN/1.73
GLOBULIN UR ELPH-MCNC: 3.4 GM/DL
GLUCOSE SERPL-MCNC: 88 MG/DL (ref 65–99)
POTASSIUM SERPL-SCNC: 5 MMOL/L (ref 3.5–5.2)
PROT SERPL-MCNC: 7.4 G/DL (ref 6–8.5)
SODIUM SERPL-SCNC: 137 MMOL/L (ref 136–145)

## 2021-08-06 PROCEDURE — 36415 COLL VENOUS BLD VENIPUNCTURE: CPT

## 2021-08-06 PROCEDURE — 80053 COMPREHEN METABOLIC PANEL: CPT

## 2021-09-09 ENCOUNTER — OFFICE VISIT (OUTPATIENT)
Dept: FAMILY MEDICINE CLINIC | Facility: CLINIC | Age: 61
End: 2021-09-09

## 2021-09-09 VITALS
OXYGEN SATURATION: 97 % | BODY MASS INDEX: 21.42 KG/M2 | TEMPERATURE: 96.8 F | SYSTOLIC BLOOD PRESSURE: 134 MMHG | HEART RATE: 84 BPM | HEIGHT: 66 IN | WEIGHT: 133.3 LBS | DIASTOLIC BLOOD PRESSURE: 94 MMHG

## 2021-09-09 DIAGNOSIS — G47.00 INSOMNIA, UNSPECIFIED TYPE: Chronic | ICD-10-CM

## 2021-09-09 DIAGNOSIS — G89.4 CHRONIC PAIN SYNDROME: ICD-10-CM

## 2021-09-09 DIAGNOSIS — Z11.59 NEED FOR HEPATITIS C SCREENING TEST: Primary | ICD-10-CM

## 2021-09-09 DIAGNOSIS — F41.9 ANXIETY DISORDER, UNSPECIFIED TYPE: Chronic | ICD-10-CM

## 2021-09-09 DIAGNOSIS — F51.01 PRIMARY INSOMNIA: ICD-10-CM

## 2021-09-09 PROBLEM — IMO0002 LUPUS: Chronic | Status: ACTIVE | Noted: 2021-09-09

## 2021-09-09 PROBLEM — M32.9 LUPUS: Chronic | Status: ACTIVE | Noted: 2021-09-09

## 2021-09-09 PROCEDURE — 99214 OFFICE O/P EST MOD 30 MIN: CPT | Performed by: FAMILY MEDICINE

## 2021-09-09 RX ORDER — CLONAZEPAM 1 MG/1
1 TABLET ORAL NIGHTLY PRN
Qty: 7 TABLET | Refills: 0 | Status: SHIPPED | OUTPATIENT
Start: 2021-09-09 | End: 2021-09-16

## 2021-09-09 RX ORDER — TRAZODONE HYDROCHLORIDE 50 MG/1
50 TABLET ORAL NIGHTLY
Qty: 90 TABLET | Refills: 1 | Status: SHIPPED | OUTPATIENT
Start: 2021-09-09 | End: 2021-10-15 | Stop reason: SDUPTHER

## 2021-09-09 RX ORDER — CITALOPRAM 40 MG/1
40 TABLET ORAL DAILY
Qty: 90 TABLET | Refills: 1 | Status: SHIPPED | OUTPATIENT
Start: 2021-09-09 | End: 2021-10-15 | Stop reason: SDUPTHER

## 2021-09-09 RX ORDER — DULOXETIN HYDROCHLORIDE 30 MG/1
30 CAPSULE, DELAYED RELEASE ORAL DAILY
Qty: 30 CAPSULE | Refills: 0 | Status: SHIPPED | OUTPATIENT
Start: 2021-09-09 | End: 2021-10-15

## 2021-09-09 RX ORDER — TRAMADOL HYDROCHLORIDE 50 MG/1
50 TABLET ORAL DAILY
Qty: 7 TABLET | Refills: 0 | Status: SHIPPED | OUTPATIENT
Start: 2021-09-09 | End: 2022-04-02

## 2021-09-14 ENCOUNTER — OFFICE VISIT (OUTPATIENT)
Dept: NEUROSURGERY | Facility: CLINIC | Age: 61
End: 2021-09-14

## 2021-09-14 VITALS
DIASTOLIC BLOOD PRESSURE: 82 MMHG | WEIGHT: 132 LBS | HEART RATE: 94 BPM | BODY MASS INDEX: 21.21 KG/M2 | HEIGHT: 66 IN | SYSTOLIC BLOOD PRESSURE: 112 MMHG

## 2021-09-14 DIAGNOSIS — M47.24 THORACIC RADICULOPATHY DUE TO OSTEOARTHRITIS OF SPINE: Primary | ICD-10-CM

## 2021-09-14 DIAGNOSIS — S22.000G THORACIC COMPRESSION FRACTURE, WITH DELAYED HEALING, SUBSEQUENT ENCOUNTER: ICD-10-CM

## 2021-09-14 PROCEDURE — 99213 OFFICE O/P EST LOW 20 MIN: CPT | Performed by: NURSE PRACTITIONER

## 2021-09-14 NOTE — PROGRESS NOTES
Chief Complaint  Back Pain    Subjective          Danielle Manuel who is a 61 y.o. year old female who presents to [facility] for follow up of her mid back pain with T9 compression fracture.     At her last visit we had ordered MRI Thoracic to evaluate staging of compression fracture due to worsening pain. She reports being without insurance and she was unable to afford the MRI. She was ordered a TLSO brace and reports that she received it though is not wearing it today.     Her back pain has not improved. Primarily in the mid back with radiating pain into the right upper ribs into the axilla.     She has had another fall since her last visit. Has concerns of unsteadiness on her feet. At her last visit we started Gabapentin, which she feels helps her pain some though does make her dizzy.       Interval History 7/26/21  Danielle Manuel who is a 61 y.o. year old female who presents to [facility] for evaluation of mid back pain.      Pt fell on the ice in February of this year. Initially had mild back pain, though not particularly bothersome. She was not evaluated at that time. Her pain started progressing and becoming more severe in the mid back radiating around into the chest and into the scapular territory bilaterally. She had thoracic spine XR in May showing a T9 compression fracture with 50% loss of height. She rates her pain as severe 10/10. She takes Tramadol for her pain, reports that she is supposed to take this three times a day, though she has been taking this 7-8 times per day without relief. Her pain is worse when sitting for long periods of time and when laying on her back. Walking seems to help her pain. She describes the pain as burning, tingling, throbbing.      She also recently had a bone density study, demonstrating osteoporosis. She recently started Fosamax per her PCP.     Recent Interventions: TLSO  "brace      Review of Systems   Musculoskeletal: Positive for arthralgias and back pain.   All other systems reviewed and are negative.       Objective   Vital Signs:   /82   Pulse 94   Ht 167.6 cm (66\")   Wt 59.9 kg (132 lb)   BMI 21.31 kg/m²       Physical Exam  Vitals reviewed.   Constitutional:       Appearance: Normal appearance.   Musculoskeletal:      Thoracic back: Tenderness present. Scoliosis present.   Neurological:      Mental Status: She is alert and oriented to person, place, and time.      Gait: Gait is intact.      Deep Tendon Reflexes: Strength normal.      Reflex Scores:       Tricep reflexes are 1+ on the right side and 1+ on the left side.       Bicep reflexes are 1+ on the right side and 1+ on the left side.       Brachioradialis reflexes are 2+ on the right side and 2+ on the left side.       Patellar reflexes are 1+ on the right side and 1+ on the left side.       Achilles reflexes are 2+ on the right side and 2+ on the left side.       Neurologic Exam     Mental Status   Oriented to person, place, and time.   Level of consciousness: alert    Motor Exam   Muscle bulk: normal  Overall muscle tone: normal    Strength   Strength 5/5 throughout.     Sensory Exam   Light touch normal.     Gait, Coordination, and Reflexes     Gait  Gait: normal    Reflexes   Right brachioradialis: 2+  Left brachioradialis: 2+  Right biceps: 1+  Left biceps: 1+  Right triceps: 1+  Left triceps: 1+  Right patellar: 1+  Left patellar: 1+  Right achilles: 2+  Left achilles: 2+  Right Steinberg: absent  Left Steinberg: absent  Right ankle clonus: absent  Left ankle clonus: absent       Result Review :                 Assessment and Plan    Diagnoses and all orders for this visit:    1. Thoracic radiculopathy due to osteoarthritis of spine (Primary)    2. Thoracic compression fracture, with delayed healing, subsequent encounter  -     XR Spine Thoracic 2 View; Future     Pt with T9 compression fracture, with " worsening mid back pain with radiculopathy. She has been without insurance and has been unable to afford MRI Thoracic Spine to evaluate staging of fracture and potential canal or foraminal stenosis. She is not myelopathic on exam, which is reassuring. She will call when she wishes to proceed with MRI Thoracic Spine, and will plan to order at that time for possible interventional management. She will continue wearing her TLSO brace for support, as well as Gabapentin for pain. Follow up in 8 weeks.       Follow Up   No follow-ups on file.  Patient was given instructions and counseling regarding her condition or for health maintenance advice.     -XR Thoracic Spine  -TLSO Brace when up walking  -Gabapentin 300 mg three times daily  -Follow up in 8 weeks    Addendum  Patient called in shortly after this appointment and reports she has insurance now and wishes to proceed with MRI Thoracic Spine. Order placed.

## 2021-09-14 NOTE — PATIENT INSTRUCTIONS
-XR Thoracic Spine  -TLSO Brace when up walking  -Gabapentin 300 mg three times daily  -Follow up in 8 weeks

## 2021-09-16 ENCOUNTER — HOSPITAL ENCOUNTER (OUTPATIENT)
Dept: GENERAL RADIOLOGY | Facility: HOSPITAL | Age: 61
Discharge: HOME | End: 2021-09-16
Admitting: NURSE PRACTITIONER

## 2021-09-16 ENCOUNTER — TELEPHONE (OUTPATIENT)
Dept: NEUROSURGERY | Facility: CLINIC | Age: 61
End: 2021-09-16

## 2021-09-16 ENCOUNTER — OFFICE VISIT (OUTPATIENT)
Dept: FAMILY MEDICINE CLINIC | Facility: CLINIC | Age: 61
End: 2021-09-16

## 2021-09-16 VITALS
HEART RATE: 76 BPM | TEMPERATURE: 97.5 F | BODY MASS INDEX: 21.18 KG/M2 | DIASTOLIC BLOOD PRESSURE: 94 MMHG | OXYGEN SATURATION: 98 % | WEIGHT: 131.8 LBS | SYSTOLIC BLOOD PRESSURE: 123 MMHG | HEIGHT: 66 IN

## 2021-09-16 DIAGNOSIS — S22.000G THORACIC COMPRESSION FRACTURE, WITH DELAYED HEALING, SUBSEQUENT ENCOUNTER: ICD-10-CM

## 2021-09-16 DIAGNOSIS — F41.9 ANXIETY DISORDER, UNSPECIFIED TYPE: Primary | ICD-10-CM

## 2021-09-16 DIAGNOSIS — Z12.2 ENCOUNTER FOR SCREENING FOR LUNG CANCER: ICD-10-CM

## 2021-09-16 DIAGNOSIS — F17.200 TOBACCO USE DISORDER: ICD-10-CM

## 2021-09-16 DIAGNOSIS — F41.9 ANXIETY DISORDER, UNSPECIFIED TYPE: Chronic | ICD-10-CM

## 2021-09-16 DIAGNOSIS — F51.01 PRIMARY INSOMNIA: ICD-10-CM

## 2021-09-16 DIAGNOSIS — G89.4 CHRONIC PAIN DISORDER: ICD-10-CM

## 2021-09-16 PROCEDURE — 99213 OFFICE O/P EST LOW 20 MIN: CPT | Performed by: FAMILY MEDICINE

## 2021-09-16 PROCEDURE — 72070 X-RAY EXAM THORAC SPINE 2VWS: CPT

## 2021-09-16 RX ORDER — CLONAZEPAM 1 MG/1
1 TABLET ORAL NIGHTLY PRN
Qty: 30 TABLET | Refills: 1 | Status: SHIPPED | OUTPATIENT
Start: 2021-09-16 | End: 2022-02-09 | Stop reason: SDUPTHER

## 2021-09-16 NOTE — PROGRESS NOTES
"Chief Complaint  Follow-up (1 week follow up on medication )    Subjective          Danielle Manuel presents to [facility]  History of Present Illness    Patient presents with chronic pain anxiety depression lupus she says the duloxetine did not help her pain or her depression made things worse like she was going to die.  She has had significant relief with Klonopin in the past counseled on this extensively on taking pain medicine and benzos risk benefits discussed    Objective   Vital Signs:   /94   Pulse 76   Temp 97.5 °F (36.4 °C) (Temporal)   Ht 167.6 cm (66\")   Wt 59.8 kg (131 lb 12.8 oz)   SpO2 98%   BMI 21.27 kg/m²     Physical Exam  Vitals reviewed.   Constitutional:       Appearance: Normal appearance. She is well-developed.   HENT:      Head: Normocephalic and atraumatic.      Right Ear: External ear normal.      Left Ear: External ear normal.      Mouth/Throat:      Pharynx: No oropharyngeal exudate.   Eyes:      Conjunctiva/sclera: Conjunctivae normal.      Pupils: Pupils are equal, round, and reactive to light.   Cardiovascular:      Rate and Rhythm: Normal rate and regular rhythm.      Heart sounds: No murmur heard.   No friction rub. No gallop.    Pulmonary:      Effort: Pulmonary effort is normal.      Breath sounds: Normal breath sounds. No wheezing or rhonchi.   Abdominal:      General: Bowel sounds are normal. There is no distension.      Palpations: Abdomen is soft.      Tenderness: There is no abdominal tenderness.   Skin:     General: Skin is warm and dry.   Neurological:      Mental Status: She is alert and oriented to person, place, and time.      Cranial Nerves: No cranial nerve deficit.   Psychiatric:         Mood and Affect: Mood and affect normal.         Behavior: Behavior normal.         Thought Content: Thought content normal.         Judgment: Judgment normal.        Result Review :   The following data was reviewed by: Teofilo Ordonez DO " on 09/16/2021:  Common labs    Common Labsle 5/14/21 8/6/21   Glucose  88   Glucose 79    BUN 7 9   Creatinine 0.70 0.71   eGFR Non African Am  84   Sodium 133 (A) 137   Potassium 3.9 5.0   Chloride 97 (A) 100   Calcium 9.2 9.0   Albumin 4.2 4.00   Total Bilirubin 1.13 0.8   Alkaline Phosphatase 96 76   AST (SGOT) 36 24   ALT (SGPT) 27 17   WBC 5.05    Hemoglobin 14.8    Hematocrit 44.2    Platelets 160    Total Cholesterol 222 (A)    Triglycerides 63    HDL Cholesterol 108 (A)    LDL Cholesterol  101 (A)    (A) Abnormal value       Comments are available for some flowsheets but are not being displayed.                  Patient had labs in May 2021       Assessment and Plan    Diagnoses and all orders for this visit:    1. Anxiety disorder, unspecified type (Primary)  -     clonazePAM (KlonoPIN) 1 MG tablet; Take 1 tablet by mouth At Night As Needed for Anxiety.  Dispense: 30 tablet; Refill: 1    2. Chronic pain disorder    3. Primary insomnia    4. Anxiety disorder, unspecified type  Comments:  Stable; UDS and AYLA reviewed and appropriate. Refill Klonopin 1mg daily prn #30/0. Refill Celexa . Follow up in 3mths.  Orders:  -     clonazePAM (KlonoPIN) 1 MG tablet; Take 1 tablet by mouth At Night As Needed for Anxiety.  Dispense: 30 tablet; Refill: 1    5. Tobacco use disorder  -     CT Chest Low Dose Wo; Future    6. Encounter for screening for lung cancer  -     CT Chest Low Dose Wo; Future    Other orders  -     Cancel: CT 3D Recon With Image Postprocess; Future  -     Cancel: Ambulatory Referral to Multi-Disciplinary Clinic  -     Cancel: CT 3D Recon With Image Postprocess; Future      Will order lung cancer screening follow-up with rheumatology or dermatology for chronic lupus issues and her psoriasis, refilled Klonopin for patient to use sparingly as needed and follow-up with pain management for other chronic issues of pain      Follow Up   Return in about 4 weeks (around 10/14/2021), or if symptoms worsen or  fail to improve, for Next scheduled follow up.  Patient was given instructions and counseling regarding her condition or for health maintenance advice. Please see specific information pulled into the AVS if appropriate.       Answers for HPI/ROS submitted by the patient on 9/15/2021  Please describe your symptoms.: pain, anxiety  Have you had these symptoms before?: Yes  How long have you been having these symptoms?: Greater than 2 weeks  Please list any medications you are currently taking for this condition.: cymbalta  What is the primary reason for your visit?: Other

## 2021-09-16 NOTE — TELEPHONE ENCOUNTER
Pt would like an MRI now that she doesn't have any out of pocket cost.   Wants to know if you will put in the order for her to get one

## 2021-09-16 NOTE — TELEPHONE ENCOUNTER
Caller: Danielle Manuel  Relationship: Self  Best call back number: [phone]    What orders are you requesting (i.e. lab or imaging):   MRI ORDERS  In what timeframe would the patient need to come in: N/A    Additional notes:   PATIENT HAS CHANGED INSURANCES TO [employer] AND WOULD LIKE TO SEE IF LEIA WILLISLAMONTMARLENE WOULD ORDER AN MRI FOR HER SINCE SHE NOW HAS AN INSURANCE THAT WILL COVER THE FULL COST OF THE MRI.

## 2021-09-20 ENCOUNTER — TELEPHONE (OUTPATIENT)
Dept: NEUROSURGERY | Facility: CLINIC | Age: 61
End: 2021-09-20

## 2021-09-20 NOTE — TELEPHONE ENCOUNTER
Left message for patient ok for hub to read    XR T spine shows stable T9 compression fracture. Incidental finding of a nodule in the right upper lobe. I have sent this information to her PCP.

## 2021-09-20 NOTE — TELEPHONE ENCOUNTER
----- Message from DREW Chau sent at 9/20/2021 12:25 PM EDT -----  XR T spine shows stable T9 compression fracture. Incidental finding of a nodule in the right upper lobe. I have sent this information to her PCP.

## 2021-09-21 DIAGNOSIS — R91.1 NODULE OF UPPER LOBE OF RIGHT LUNG: Primary | ICD-10-CM

## 2021-09-22 ENCOUNTER — HOSPITAL ENCOUNTER (OUTPATIENT)
Dept: CT IMAGING | Facility: HOSPITAL | Age: 61
Discharge: HOME | End: 2021-09-22
Admitting: FAMILY MEDICINE

## 2021-09-22 DIAGNOSIS — Z12.2 ENCOUNTER FOR SCREENING FOR LUNG CANCER: ICD-10-CM

## 2021-09-22 DIAGNOSIS — F17.200 TOBACCO USE DISORDER: ICD-10-CM

## 2021-09-22 PROCEDURE — 71271 CT THORAX LUNG CANCER SCR C-: CPT

## 2021-09-23 ENCOUNTER — TELEPHONE (OUTPATIENT)
Dept: FAMILY MEDICINE CLINIC | Facility: CLINIC | Age: 61
End: 2021-09-23

## 2021-09-23 DIAGNOSIS — M32.9 LUPUS (HCC): Primary | ICD-10-CM

## 2021-09-28 DIAGNOSIS — M80.08XG PATHOLOGICAL FRACTURE OF VERTEBRA DUE TO AGE-RELATED OSTEOPOROSIS WITH DELAYED HEALING, SUBSEQUENT ENCOUNTER: ICD-10-CM

## 2021-09-28 DIAGNOSIS — S22.000G THORACIC COMPRESSION FRACTURE, WITH DELAYED HEALING, SUBSEQUENT ENCOUNTER: Primary | ICD-10-CM

## 2021-09-28 RX ORDER — HYDROCODONE BITARTRATE AND ACETAMINOPHEN 5; 325 MG/1; MG/1
1 TABLET ORAL EVERY 8 HOURS PRN
Qty: 21 TABLET | Refills: 0 | Status: SHIPPED | OUTPATIENT
Start: 2021-09-28 | End: 2022-05-12 | Stop reason: SDUPTHER

## 2021-09-28 NOTE — PROGRESS NOTES
I will send in a short duration of Norco 5/325 mg every 8 hours as needed for severe pain. Referral placed to pain management for medication management.

## 2021-10-08 ENCOUNTER — TRANSCRIBE ORDERS (OUTPATIENT)
Dept: GENERAL RADIOLOGY | Facility: HOSPITAL | Age: 61
End: 2021-10-08

## 2021-10-08 ENCOUNTER — HOSPITAL ENCOUNTER (OUTPATIENT)
Dept: GENERAL RADIOLOGY | Facility: HOSPITAL | Age: 61
Discharge: HOME | End: 2021-10-08

## 2021-10-08 ENCOUNTER — HOSPITAL ENCOUNTER (OUTPATIENT)
Dept: MRI IMAGING | Facility: HOSPITAL | Age: 61
Discharge: HOME | End: 2021-10-08

## 2021-10-08 ENCOUNTER — TELEPHONE (OUTPATIENT)
Dept: NEUROSURGERY | Facility: CLINIC | Age: 61
End: 2021-10-08

## 2021-10-08 DIAGNOSIS — M47.812 CERVICAL SPONDYLOSIS WITHOUT MYELOPATHY: ICD-10-CM

## 2021-10-08 DIAGNOSIS — S22.000G THORACIC COMPRESSION FRACTURE, WITH DELAYED HEALING, SUBSEQUENT ENCOUNTER: ICD-10-CM

## 2021-10-08 DIAGNOSIS — M47.24 THORACIC RADICULOPATHY DUE TO OSTEOARTHRITIS OF SPINE: ICD-10-CM

## 2021-10-08 DIAGNOSIS — M47.812 CERVICAL SPONDYLOSIS WITHOUT MYELOPATHY: Primary | ICD-10-CM

## 2021-10-08 PROCEDURE — 72040 X-RAY EXAM NECK SPINE 2-3 VW: CPT

## 2021-10-08 PROCEDURE — 72146 MRI CHEST SPINE W/O DYE: CPT

## 2021-10-08 NOTE — TELEPHONE ENCOUNTER
Spoke to pt and gave Dr. Soto recommendation on going to acute care and getting a urinalysis done.     If that comes back clear. She will need MRI of Lumbar spine.     Pt stated that she does not want to go to the ED, she went a couple of days ago and she waited for over 8hrs and they did nothing for her.     Pt states she has bladder incontinence 1 to 2 times weekly. Or some weeks she wont have the issue at all.     Advised pt to call Monday and let our office know what the urinalysis says.. so Rina can order MRI is that is negative

## 2021-10-11 ENCOUNTER — TELEPHONE (OUTPATIENT)
Dept: NEUROSURGERY | Facility: CLINIC | Age: 61
End: 2021-10-11

## 2021-10-11 NOTE — TELEPHONE ENCOUNTER
I CALLED S/W PT AND SHE STATED OK SHE UNDERSTOOD THAT SHE DIDN'T HAVE TO DO ANY IMAGING ON HER LOWER SPINE D/T SHE DOES HAVE A UTI.

## 2021-10-11 NOTE — TELEPHONE ENCOUNTER
Provider: DREW MEYERS  Caller: HARPER GREEN  Relationship to Patient: SELF    Reason for Call: PT CALLING JUST TO LET DREW IQBAL KNOW THAT SHE HAS A UTI , PT SEEN [facility] AND THEY WHERE CONCERN AND THEY CALLED AND S/W DR. SHAVONNE LIMA AND HE WANTED PT TO GO AND HAVE A UTI TEST DONE AND TALKING ABOUT TO HAVE SOME OTHER IMAGING OF LOWER SPINE DONE.[facility] AND SHE DOES HAVE A UTI AND WANTED TO SEE IF DR. LIMA STILL WANTED HER TO HAVE THE IMAGING DONE?    PLEASE CALL HER BACK [phone]

## 2021-10-15 ENCOUNTER — OFFICE VISIT (OUTPATIENT)
Dept: FAMILY MEDICINE CLINIC | Facility: CLINIC | Age: 61
End: 2021-10-15

## 2021-10-15 VITALS
WEIGHT: 130.8 LBS | SYSTOLIC BLOOD PRESSURE: 138 MMHG | HEART RATE: 73 BPM | DIASTOLIC BLOOD PRESSURE: 96 MMHG | OXYGEN SATURATION: 99 % | BODY MASS INDEX: 21.02 KG/M2 | HEIGHT: 66 IN | TEMPERATURE: 97 F

## 2021-10-15 DIAGNOSIS — G47.00 INSOMNIA, UNSPECIFIED TYPE: Chronic | ICD-10-CM

## 2021-10-15 DIAGNOSIS — J41.8 MIXED SIMPLE AND MUCOPURULENT CHRONIC BRONCHITIS (HCC): Primary | ICD-10-CM

## 2021-10-15 DIAGNOSIS — F41.9 ANXIETY DISORDER, UNSPECIFIED TYPE: Chronic | ICD-10-CM

## 2021-10-15 PROCEDURE — 99214 OFFICE O/P EST MOD 30 MIN: CPT | Performed by: FAMILY MEDICINE

## 2021-10-15 RX ORDER — TRAZODONE HYDROCHLORIDE 50 MG/1
50 TABLET ORAL NIGHTLY
Qty: 90 TABLET | Refills: 1 | Status: SHIPPED | OUTPATIENT
Start: 2021-10-15 | End: 2021-11-03

## 2021-10-15 RX ORDER — TRIAMCINOLONE ACETONIDE 1 MG/G
CREAM TOPICAL
COMMUNITY
Start: 2021-09-25 | End: 2022-08-03

## 2021-10-15 RX ORDER — SIMVASTATIN 20 MG
20 TABLET ORAL NIGHTLY
Qty: 90 TABLET | Refills: 3 | Status: SHIPPED | OUTPATIENT
Start: 2021-10-15 | End: 2022-03-24

## 2021-10-15 RX ORDER — SIMVASTATIN 20 MG
TABLET ORAL
COMMUNITY
Start: 2021-10-09 | End: 2021-10-15 | Stop reason: SDUPTHER

## 2021-10-15 RX ORDER — CITALOPRAM 40 MG/1
40 TABLET ORAL DAILY
Qty: 90 TABLET | Refills: 1 | Status: SHIPPED | OUTPATIENT
Start: 2021-10-15 | End: 2021-11-03

## 2021-10-15 RX ORDER — NITROFURANTOIN 25; 75 MG/1; MG/1
CAPSULE ORAL
COMMUNITY
Start: 2021-10-10 | End: 2021-11-03

## 2021-10-15 RX ORDER — BUSPIRONE HYDROCHLORIDE 5 MG/1
5 TABLET ORAL 3 TIMES DAILY
Qty: 90 TABLET | Refills: 3 | Status: SHIPPED | OUTPATIENT
Start: 2021-10-15 | End: 2022-07-07

## 2021-10-15 RX ORDER — NALOXONE HYDROCHLORIDE 4 MG/.1ML
SPRAY NASAL
COMMUNITY
Start: 2021-10-07 | End: 2021-11-11

## 2021-10-15 RX ORDER — PHENAZOPYRIDINE HYDROCHLORIDE 200 MG/1
TABLET, FILM COATED ORAL
COMMUNITY
Start: 2021-10-10 | End: 2021-11-03

## 2021-10-15 RX ORDER — COVID-19 TEST SPECIMEN COLLECT
MISCELLANEOUS MISCELLANEOUS
COMMUNITY
Start: 2021-10-11 | End: 2022-02-09

## 2021-10-15 RX ORDER — ALENDRONATE SODIUM 70 MG/1
70 TABLET ORAL
Qty: 12 TABLET | Refills: 3 | Status: SHIPPED | OUTPATIENT
Start: 2021-10-15 | End: 2023-01-03 | Stop reason: SDUPTHER

## 2021-10-15 RX ORDER — HYDROXYCHLOROQUINE SULFATE 200 MG/1
TABLET, FILM COATED ORAL
COMMUNITY
Start: 2021-09-23 | End: 2022-11-10

## 2021-10-15 NOTE — PROGRESS NOTES
"Chief Complaint  Follow-up (medication) and Anxiety (discuss medications )    Subjective          Danielle Manuel presents to [facility]  Patient presents to clinic to discuss changing klonopin to something else for her anxiety. She states that she has been on klonopin for several years and has began to taper it herself only taking 1 every other day for the past 2 weeks without any issues. She has also taken Ativan in the past and is agreeable to try something else.     She states taht she was also recently seen at urgent care and diagnosed with UTI and is without related complaints currently but notes that they told her that her right lung had \"crackling sounds\" on examination and they gave her albuterol.       Objective   Vital Signs:   /96 (BP Location: Left arm, Patient Position: Sitting, Cuff Size: Adult)   Pulse 73   Temp 97 °F (36.1 °C) (Temporal)   Ht 167.6 cm (66\")   Wt 59.3 kg (130 lb 12.8 oz)   SpO2 99%   BMI 21.11 kg/m²     Physical Exam  Vitals reviewed.   Constitutional:       Appearance: Normal appearance. She is well-developed.   HENT:      Head: Normocephalic and atraumatic.      Right Ear: External ear normal.      Left Ear: External ear normal.      Mouth/Throat:      Pharynx: No oropharyngeal exudate.   Eyes:      Conjunctiva/sclera: Conjunctivae normal.      Pupils: Pupils are equal, round, and reactive to light.   Cardiovascular:      Rate and Rhythm: Normal rate and regular rhythm.      Heart sounds: No murmur heard.  No friction rub. No gallop.    Pulmonary:      Effort: Pulmonary effort is normal.      Breath sounds: Examination of the right-middle field reveals wheezing and rales. Examination of the right-lower field reveals rales. Wheezing and rales present. No rhonchi.   Abdominal:      General: Bowel sounds are normal. There is no distension.      Palpations: Abdomen is soft.      Tenderness: There is no abdominal tenderness.   Skin:     " General: Skin is warm and dry.   Neurological:      Mental Status: She is alert and oriented to person, place, and time.      Cranial Nerves: No cranial nerve deficit.   Psychiatric:         Mood and Affect: Mood and affect normal.         Behavior: Behavior normal.         Thought Content: Thought content normal.         Judgment: Judgment normal.        Result Review :                 Assessment and Plan    Diagnoses and all orders for this visit:    1. Mixed simple and mucopurulent chronic bronchitis (HCC) (Primary)  -     tiotropium (Spiriva HandiHaler) 18 MCG per inhalation capsule; Place 1 capsule into inhaler and inhale Daily.  Dispense: 28 capsule; Refill: 11    2. Anxiety disorder, unspecified type  Comments:  Stable; UDS and AYLA reviewed and appropriate. Refill Klonopin 1mg daily prn #30/0. Refill Celexa . Follow up in 3mths.  Orders:  -     citalopram (CeleXA) 40 MG tablet; Take 1 tablet by mouth Daily.  Dispense: 90 tablet; Refill: 1    3. Insomnia, unspecified type  Comments:  Stable; continue current medication and f/u in 3mths.  Orders:  -     traZODone (DESYREL) 50 MG tablet; Take 1 tablet by mouth Every Night.  Dispense: 90 tablet; Refill: 1    Other orders  -     simvastatin (ZOCOR) 20 MG tablet; Take 1 tablet by mouth Every Night.  Dispense: 90 tablet; Refill: 3  -     alendronate (FOSAMAX) 70 MG tablet; Take 1 tablet by mouth Every 7 (Seven) Days.  Dispense: 12 tablet; Refill: 3  -     busPIRone (BUSPAR) 5 MG tablet; Take 1 tablet by mouth 3 (Three) Times a Day.  Dispense: 90 tablet; Refill: 3      Patient will start on Spiriva for COPD to treat take daily continue with as needed ProAir    We will continue with trazodone for insomnia renewed today and other medicines for anxiety Celexa consider increasing duloxetine, will change to BuSpar and continue to wean down off her Klonopin     renewed her alendronate for osteoporosis simvastatin for hyperlipidemia controlled     follow-up in 2 to 4  weeks sooner if concerns or no improvement      Follow Up   Return in about 4 weeks (around 11/12/2021), or if symptoms worsen or fail to improve.  Patient was given instructions and counseling regarding her condition or for health maintenance advice. Please see specific information pulled into the AVS if appropriate.

## 2021-11-03 ENCOUNTER — OFFICE VISIT (OUTPATIENT)
Dept: NEUROSURGERY | Facility: CLINIC | Age: 61
End: 2021-11-03

## 2021-11-03 VITALS
SYSTOLIC BLOOD PRESSURE: 110 MMHG | DIASTOLIC BLOOD PRESSURE: 80 MMHG | BODY MASS INDEX: 21.38 KG/M2 | HEIGHT: 66 IN | WEIGHT: 133 LBS

## 2021-11-03 DIAGNOSIS — S22.070D CLOSED WEDGE COMPRESSION FRACTURE OF T9 VERTEBRA WITH ROUTINE HEALING, SUBSEQUENT ENCOUNTER: Primary | ICD-10-CM

## 2021-11-03 DIAGNOSIS — M47.24 THORACIC RADICULOPATHY DUE TO OSTEOARTHRITIS OF SPINE: ICD-10-CM

## 2021-11-03 PROCEDURE — 99213 OFFICE O/P EST LOW 20 MIN: CPT | Performed by: NURSE PRACTITIONER

## 2021-11-03 NOTE — PROGRESS NOTES
"Chief Complaint  Back Pain    Subjective          Danielle Manuel who is a 61 y.o. year old female who presents to [facility] for follow up of her T9 compression fracture. Recent MRI Thoracic Spine.    MRI Thoracic Spine at [facility] reviewed, showing healed T9 compression fracture with 50% loss of height. Multilevel degenerative changed with DDD. Small disc protrusion at T2-T2 without canal or foraminal narrowing. No cord signal abnormalities.      Pt wearing TLSO brace as needed. She has established care with pain management, who has continued Norco and Gabapentin for her pain. Dr. Melvin is also planning on cervical MBB and RFA for her neck pain.       Recent Interventions: Pain management for medication management. TLSO brace      Review of Systems   Musculoskeletal: Positive for arthralgias, back pain, neck pain and neck stiffness.   All other systems reviewed and are negative.       Objective   Vital Signs:   /80   Ht 167.6 cm (66\")   Wt 60.3 kg (133 lb)   BMI 21.47 kg/m²       Physical Exam  Vitals reviewed.   Constitutional:       Appearance: Normal appearance.   Neurological:      Mental Status: She is alert and oriented to person, place, and time.      Gait: Gait is intact.      Deep Tendon Reflexes: Strength normal.        Neurologic Exam     Mental Status   Oriented to person, place, and time.   Level of consciousness: alert    Motor Exam   Muscle bulk: normal  Overall muscle tone: normal    Strength   Strength 5/5 throughout.     Sensory Exam   Light touch normal.     Gait, Coordination, and Reflexes     Gait  Gait: normal       Result Review :       Data reviewed: Radiologic studies MRI Thoracic Spine at [facility] reviewed, showing healed T9 compression fracture with 50% loss of height. Multilevel degenerative changed with DDD. Small disc protrusion at T2-T2 without canal or foraminal narrowing. No cord signal abnormalities.           Assessment and Plan  "   Diagnoses and all orders for this visit:    1. Closed wedge compression fracture of T9 vertebra with routine healing, subsequent encounter (Primary)    2. Thoracic radiculopathy due to osteoarthritis of spine    Pt with T9 compression fracture following a fall in February of this year, with persistent thoracic pain with radiculopathy. We reviewed her MRI Thoracic Spine, showing healed T9 compression fracture with multilevel DDD and spondylosis. No high grade canal or foraminal narrowing. We discussed weaning off TLSO brace. She has established care with pain management for medication management. She can follow up in our office on an as needed basis.       Follow Up   Return if symptoms worsen or fail to improve.  Patient was given instructions and counseling regarding her condition or for health maintenance advice.     -Continue with pain management for medication management  -TLSO brace as needed  -Follow up as needed

## 2021-11-03 NOTE — PATIENT INSTRUCTIONS
-Continue with pain management for medication management  -TLSO brace as needed  -Follow up as needed

## 2021-11-11 ENCOUNTER — OFFICE VISIT (OUTPATIENT)
Dept: FAMILY MEDICINE CLINIC | Facility: CLINIC | Age: 61
End: 2021-11-11

## 2021-11-11 VITALS
HEIGHT: 66 IN | TEMPERATURE: 97.6 F | SYSTOLIC BLOOD PRESSURE: 115 MMHG | WEIGHT: 132.1 LBS | HEART RATE: 77 BPM | OXYGEN SATURATION: 99 % | BODY MASS INDEX: 21.23 KG/M2 | DIASTOLIC BLOOD PRESSURE: 77 MMHG

## 2021-11-11 DIAGNOSIS — G89.4 CHRONIC PAIN DISORDER: Chronic | ICD-10-CM

## 2021-11-11 DIAGNOSIS — M32.9 LUPUS (HCC): Chronic | ICD-10-CM

## 2021-11-11 DIAGNOSIS — E55.9 VITAMIN D DEFICIENCY: ICD-10-CM

## 2021-11-11 DIAGNOSIS — E78.2 MIXED HYPERLIPIDEMIA: Chronic | ICD-10-CM

## 2021-11-11 DIAGNOSIS — M80.00XD AGE-RELATED OSTEOPOROSIS WITH CURRENT PATHOLOGICAL FRACTURE WITH ROUTINE HEALING: ICD-10-CM

## 2021-11-11 DIAGNOSIS — F51.01 PRIMARY INSOMNIA: ICD-10-CM

## 2021-11-11 DIAGNOSIS — F41.9 ANXIETY DISORDER, UNSPECIFIED TYPE: Primary | Chronic | ICD-10-CM

## 2021-11-11 PROBLEM — M47.812 CERVICAL SPONDYLOSIS: Status: ACTIVE | Noted: 2021-11-02

## 2021-11-11 PROCEDURE — 99214 OFFICE O/P EST MOD 30 MIN: CPT | Performed by: FAMILY MEDICINE

## 2021-11-11 NOTE — PROGRESS NOTES
"Chief Complaint  Follow-up (anxiety ), Constipation, and hands and feet (cracked skin )    Subjective          Danielle Manuel presents to [facility]  History of Present Illness    Presents to follow up on anxiety, taking buspar really helping, takes rarely a klonopin now or just when shes going to have a rough day, AYLA reviewed will continue to rx if trying to use less and less, on pain medicine for chronic pain and degenerative disc, lupus.     Her blood pressure is well controlled, wants something to take for her skin joint pain which is chronic    Objective   Vital Signs:   /77   Pulse 77   Temp 97.6 °F (36.4 °C) (Temporal)   Ht 167.6 cm (66\")   Wt 59.9 kg (132 lb 1.6 oz)   SpO2 99%   BMI 21.32 kg/m²     Physical Exam  Vitals reviewed.   Constitutional:       Appearance: Normal appearance. She is well-developed.   HENT:      Head: Normocephalic and atraumatic.      Right Ear: External ear normal.      Left Ear: External ear normal.      Mouth/Throat:      Pharynx: No oropharyngeal exudate.   Eyes:      Conjunctiva/sclera: Conjunctivae normal.      Pupils: Pupils are equal, round, and reactive to light.   Cardiovascular:      Rate and Rhythm: Normal rate.   Pulmonary:      Effort: Pulmonary effort is normal.   Abdominal:      General: Abdomen is flat. There is no distension.      Palpations: Abdomen is soft.   Skin:     General: Skin is warm and dry.   Neurological:      General: No focal deficit present.      Mental Status: She is alert and oriented to person, place, and time.   Psychiatric:         Mood and Affect: Mood and affect normal.         Behavior: Behavior normal.         Thought Content: Thought content normal.         Judgment: Judgment normal.        Result Review :   The following data was reviewed by: Teofilo Ordonez DO on 11/11/2021:  Common labs    Common Labsle 5/14/21 8/6/21   Glucose 79 88   BUN 7 9   Creatinine 0.70 0.71   eGFR Non African Am  " 84   Sodium 133 (A) 137   Potassium 3.9 5.0   Chloride 97 (A) 100   Calcium 9.2 9.0   Albumin 4.2 4.00   Total Bilirubin 1.13 0.8   Alkaline Phosphatase 96 76   AST (SGOT) 36 24   ALT (SGPT) 27 17   WBC 5.05    Hemoglobin 14.8    Hematocrit 44.2    Platelets 160    Total Cholesterol 222 (A)    Triglycerides 63    HDL Cholesterol 108 (A)    LDL Cholesterol  101 (A)    (A) Abnormal value       Comments are available for some flowsheets but are not being displayed.                           Assessment and Plan    Diagnoses and all orders for this visit:    1. Anxiety disorder, unspecified type (Primary)  Assessment & Plan:  *controlled  Continue buspar daily, klonopin PRN  Counseled, AYLA reviewed  Follow up every 3-6 months      2. Mixed hyperlipidemia  Assessment & Plan:  *controlled  Continue statin  Close to goal LDL last 5/2021 was 101, HDL was 108  recheck lipids annually  Goal LDL <  Lab Results   Component Value Date    CHLPL 222 (H) 05/14/2021    TRIG 63 05/14/2021     (H) 05/14/2021     (H) 05/14/2021           3. Lupus (HCC)  Assessment & Plan:  *managed    Monitor symptoms  Follow up for any flare or worsening symptoms      4. Chronic pain disorder  Assessment & Plan:  *stable, chronic  Managed, AYLA reviewed  Follow up with pain management  Discussed trying to find medicine to help her with pain from arthritis in hands and feet  Will review medicine and follow up with ideas or appreciate help from rheum      5. Primary insomnia  Assessment & Plan:  *counseled on using trazodone for sleep adding melatonin, defer high risk medicine since already on pain meds and klonopin prn and weaning  Consider doxylamine or doxepin alternatively      6. Vitamin D deficiency  Assessment & Plan:  *managed  Continue vitaminn d and alendronate for osteoporosis        7. Age-related osteoporosis with current pathological fracture with routine healing      Follow Up   Return in about  3 weeks (around 12/2/2021).  Patient was given instructions and counseling regarding her condition or for health maintenance advice. Please see specific information pulled into the AVS if appropriate.       Answers for HPI/ROS submitted by the patient on 11/9/2021  Please describe your symptoms.: feet pain, lower abdomen pain  Have you had these symptoms before?: No  How long have you been having these symptoms?: 1-2 weeks  What is the primary reason for your visit?: Other

## 2021-11-21 PROBLEM — G89.4 CHRONIC PAIN DISORDER: Chronic | Status: ACTIVE | Noted: 2021-09-16

## 2021-11-21 PROBLEM — F41.9 ANXIETY DISORDER: Chronic | Status: ACTIVE | Noted: 2021-05-05

## 2021-11-21 PROBLEM — E78.2 MIXED HYPERLIPIDEMIA: Chronic | Status: ACTIVE | Noted: 2021-05-05

## 2021-11-21 PROBLEM — E55.9 VITAMIN D DEFICIENCY: Chronic | Status: ACTIVE | Noted: 2021-05-05

## 2021-11-21 PROBLEM — G89.29 CHRONIC PAIN: Status: RESOLVED | Noted: 2021-05-05 | Resolved: 2021-11-21

## 2021-11-21 NOTE — ASSESSMENT & PLAN NOTE
*stable, chronic  Managed, AYLA reviewed  Follow up with pain management  Discussed trying to find medicine to help her with pain from arthritis in hands and feet  Will review medicine and follow up with ideas or appreciate help from rheum

## 2021-11-21 NOTE — ASSESSMENT & PLAN NOTE
*counseled on using trazodone for sleep adding melatonin, defer high risk medicine since already on pain meds and klonopin prn and weaning  Consider doxylamine or doxepin alternatively

## 2021-11-21 NOTE — ASSESSMENT & PLAN NOTE
*controlled  Continue statin  Close to goal LDL last 5/2021 was 101, HDL was 108  recheck lipids annually  Goal LDL <  Lab Results   Component Value Date    CHLPL 222 (H) 05/14/2021    TRIG 63 05/14/2021     (H) 05/14/2021     (H) 05/14/2021

## 2021-11-21 NOTE — ASSESSMENT & PLAN NOTE
*controlled  Continue buspar daily, klonopin PRN  Counseled, AYLA reviewed  Follow up every 3-6 months

## 2022-02-09 ENCOUNTER — OFFICE VISIT (OUTPATIENT)
Dept: FAMILY MEDICINE CLINIC | Facility: CLINIC | Age: 62
End: 2022-02-09

## 2022-02-09 VITALS
SYSTOLIC BLOOD PRESSURE: 139 MMHG | OXYGEN SATURATION: 99 % | HEIGHT: 66 IN | DIASTOLIC BLOOD PRESSURE: 89 MMHG | HEART RATE: 77 BPM | WEIGHT: 128.7 LBS | BODY MASS INDEX: 20.68 KG/M2

## 2022-02-09 DIAGNOSIS — Z12.11 ENCOUNTER FOR SCREENING FOR MALIGNANT NEOPLASM OF COLON: ICD-10-CM

## 2022-02-09 DIAGNOSIS — L03.116 CELLULITIS OF LEFT LOWER EXTREMITY: Primary | ICD-10-CM

## 2022-02-09 DIAGNOSIS — R23.4 CRACKED SKIN ON FEET: ICD-10-CM

## 2022-02-09 DIAGNOSIS — F41.9 ANXIETY DISORDER, UNSPECIFIED TYPE: Chronic | ICD-10-CM

## 2022-02-09 DIAGNOSIS — M32.19 LUPUS VASCULITIS: ICD-10-CM

## 2022-02-09 DIAGNOSIS — I77.89 LUPUS VASCULITIS: ICD-10-CM

## 2022-02-09 PROCEDURE — 99214 OFFICE O/P EST MOD 30 MIN: CPT | Performed by: FAMILY MEDICINE

## 2022-02-09 RX ORDER — CLONAZEPAM 1 MG/1
1 TABLET ORAL NIGHTLY PRN
Qty: 30 TABLET | Refills: 2 | Status: SHIPPED | OUTPATIENT
Start: 2022-02-09 | End: 2022-05-12 | Stop reason: SDUPTHER

## 2022-02-09 RX ORDER — DOXYCYCLINE HYCLATE 100 MG/1
100 CAPSULE ORAL 2 TIMES DAILY
Qty: 14 CAPSULE | Refills: 0 | Status: SHIPPED | OUTPATIENT
Start: 2022-02-09 | End: 2022-03-15

## 2022-02-09 RX ORDER — BETAMETHASONE DIPROPIONATE 0.05 %
OINTMENT (GRAM) TOPICAL
COMMUNITY
Start: 2022-01-15 | End: 2022-08-03

## 2022-02-09 NOTE — PROGRESS NOTES
"Chief Complaint  Foot Pain (both feet, pt dropped wood on left foot ,possible infection) and refferel (podiatrist )    Subjective          Danielle Manuel presents to [facility]  History of Present Illness    Patient presents with significant foot pain bilaterally has a history of psoriasis and lupus vasculitis which is deteriorating her feet has a lot of cracked dry areas also very sensitive to cold    Objective   Vital Signs:   /89   Pulse 77   Ht 167.6 cm (66\")   Wt 58.4 kg (128 lb 11.2 oz)   SpO2 99%   BMI 20.77 kg/m²     Physical Exam  Vitals reviewed.   Constitutional:       Appearance: Normal appearance. She is well-developed.   HENT:      Head: Normocephalic and atraumatic.      Right Ear: External ear normal.      Left Ear: External ear normal.      Mouth/Throat:      Pharynx: No oropharyngeal exudate.   Eyes:      Conjunctiva/sclera: Conjunctivae normal.      Pupils: Pupils are equal, round, and reactive to light.   Cardiovascular:      Rate and Rhythm: Normal rate.   Pulmonary:      Effort: Pulmonary effort is normal.   Abdominal:      General: Abdomen is flat. There is no distension.      Palpations: Abdomen is soft.   Skin:     General: Skin is warm and dry.   Neurological:      General: No focal deficit present.      Mental Status: She is alert and oriented to person, place, and time.   Psychiatric:         Mood and Affect: Mood and affect normal.         Behavior: Behavior normal.         Thought Content: Thought content normal.         Judgment: Judgment normal.        Result Review :   The following data was reviewed by: Teofilo Ordonez DO on 02/09/2022:  Common labs    Common Labsle 5/14/21 8/6/21   Glucose 79 88   BUN 7 9   Creatinine 0.70 0.71   eGFR Non African Am  84   Sodium 133 (A) 137   Potassium 3.9 5.0   Chloride 97 (A) 100   Calcium 9.2 9.0   Albumin 4.2 4.00   Total Bilirubin 1.13 0.8   Alkaline Phosphatase 96 76   AST (SGOT) 36 24   ALT " (SGPT) 27 17   WBC 5.05    Hemoglobin 14.8    Hematocrit 44.2    Platelets 160    Total Cholesterol 222 (A)    Triglycerides 63    HDL Cholesterol 108 (A)    LDL Cholesterol  101 (A)    (A) Abnormal value       Comments are available for some flowsheets but are not being displayed.                     Assessment and Plan    Diagnoses and all orders for this visit:    1. Cellulitis of left lower extremity (Primary)  -     doxycycline (VIBRAMYCIN) 100 MG capsule; Take 1 capsule by mouth 2 (Two) Times a Day.  Dispense: 14 capsule; Refill: 0    2. Cracked skin on feet  -     Ambulatory Referral to Podiatry  -     ammonium lactate (AmLactin) 12 % lotion; Apply  topically to the appropriate area as directed 2 (Two) Times a Day for 30 days.  Dispense: 225 g; Refill: 1    3. Anxiety disorder, unspecified type  Comments:  Stable; LU and AYLA reviewed and appropriate. Refill Klonopin 1mg daily prn #30/0. Refill Celexa . Follow up in 3mths.  Orders:  -     clonazePAM (KlonoPIN) 1 MG tablet; Take 1 tablet by mouth At Night As Needed for Anxiety.  Dispense: 30 tablet; Refill: 2    4. Lupus vasculitis (HCC)  -     Ambulatory Referral to Vascular Surgery  -     ammonium lactate (AmLactin) 12 % lotion; Apply  topically to the appropriate area as directed 2 (Two) Times a Day for 30 days.  Dispense: 225 g; Refill: 1    5. Encounter for screening for malignant neoplasm of colon  -     Ambulatory Referral For Screening Colonoscopy      Referral to podiatry to assist with treatment, will prescribe    antibiotics for the wound on her left foot to treat possible cellulitis and strong follow-up with specialist    Referral to vascular surgery given this vasculitis further evaluation additionally        Follow Up   Return in about 4 weeks (around 3/9/2022), or if symptoms worsen or fail to improve.  Patient was given instructions and counseling regarding her condition or for health maintenance advice. Please see specific information  pulled into the AVS if appropriate.   Answers for HPI/ROS submitted by the patient on 2/9/2022  Please describe your symptoms.: Feet swelling, wound on left foot  Have you had these symptoms before?: No  How long have you been having these symptoms?: 5-7 days  Please list any medications you are currently taking for this condition.: None  Please describe any probable cause for these symptoms. : Left foot wound  What is the primary reason for your visit?: Other

## 2022-02-10 RX ORDER — AMMONIUM LACTATE 12 G/100G
LOTION TOPICAL 2 TIMES DAILY
Qty: 225 G | Refills: 1 | Status: SHIPPED | OUTPATIENT
Start: 2022-02-10 | End: 2022-03-12

## 2022-02-14 ENCOUNTER — TRANSCRIBE ORDERS (OUTPATIENT)
Dept: GENERAL RADIOLOGY | Facility: HOSPITAL | Age: 62
End: 2022-02-14

## 2022-02-14 ENCOUNTER — HOSPITAL ENCOUNTER (OUTPATIENT)
Dept: GENERAL RADIOLOGY | Facility: HOSPITAL | Age: 62
Discharge: HOME | End: 2022-02-14
Admitting: PHYSICIAN ASSISTANT

## 2022-02-14 DIAGNOSIS — M54.9 BACK PAIN, UNSPECIFIED BACK LOCATION, UNSPECIFIED BACK PAIN LATERALITY, UNSPECIFIED CHRONICITY: Primary | ICD-10-CM

## 2022-02-14 DIAGNOSIS — M54.9 BACK PAIN, UNSPECIFIED BACK LOCATION, UNSPECIFIED BACK PAIN LATERALITY, UNSPECIFIED CHRONICITY: ICD-10-CM

## 2022-02-14 PROCEDURE — 72070 X-RAY EXAM THORAC SPINE 2VWS: CPT

## 2022-03-03 ENCOUNTER — APPOINTMENT (OUTPATIENT)
Dept: CT IMAGING | Facility: HOSPITAL | Age: 62
End: 2022-03-03

## 2022-03-15 ENCOUNTER — OFFICE VISIT (OUTPATIENT)
Dept: PODIATRY | Facility: CLINIC | Age: 62
End: 2022-03-15

## 2022-03-15 VITALS
TEMPERATURE: 97.5 F | WEIGHT: 128 LBS | HEIGHT: 66 IN | OXYGEN SATURATION: 99 % | DIASTOLIC BLOOD PRESSURE: 77 MMHG | BODY MASS INDEX: 20.57 KG/M2 | SYSTOLIC BLOOD PRESSURE: 117 MMHG | HEART RATE: 71 BPM

## 2022-03-15 DIAGNOSIS — L74.4 ANHIDROSIS: Primary | ICD-10-CM

## 2022-03-15 DIAGNOSIS — M79.672 FOOT PAIN, BILATERAL: ICD-10-CM

## 2022-03-15 DIAGNOSIS — M79.671 FOOT PAIN, BILATERAL: ICD-10-CM

## 2022-03-15 PROCEDURE — 99243 OFF/OP CNSLTJ NEW/EST LOW 30: CPT | Performed by: PODIATRIST

## 2022-03-15 RX ORDER — CLOBETASOL PROPIONATE 0.5 MG/G
OINTMENT TOPICAL
COMMUNITY
Start: 2022-03-14 | End: 2022-08-03

## 2022-03-15 RX ORDER — MYCOPHENOLATE MOFETIL 500 MG/1
500 TABLET ORAL 4 TIMES DAILY
COMMUNITY
End: 2023-01-24

## 2022-03-15 RX ORDER — ONDANSETRON 4 MG/1
4 TABLET, ORALLY DISINTEGRATING ORAL EVERY 12 HOURS PRN
COMMUNITY

## 2022-03-15 RX ORDER — AMMONIUM LACTATE 12 G/100G
LOTION TOPICAL
COMMUNITY
End: 2022-08-03

## 2022-03-15 NOTE — PROGRESS NOTES
[facility] - PODIATRY    Today's Date: 03/15/22    Patient Name: Danielle Manuel  MRN: [id number]  CSN: [id number]  PCP: Teofilo Ordonez DO, Last PCP Visit: 10 February 2022  Referring Provider: Teofilo Ordonez DO    SUBJECTIVE     Chief Complaint   Patient presents with   • Left Foot - Pain     Dry and cracked   • Right Foot - Pain     Dry and cracked     HPI: Danielle Manuel, a 61 y.o.female, presents to clinic.    New, Established, New Problem: New    Location: Plantar feet and heels    Duration: Greater than 5 years    Onset: Insidious, associated with lupus    Nature: Painful cracked skin    Stable, worsening, improving: Stable with minimal improvement    Aggravating factors:  Patient relates pain is aggravated by shoe gear and ambulation.      Previous Treatment: Multiple different topical medicines.  Sees Dr. Seamus Naik dermatology also.    Patient denies any fevers, chills, nausea, vomiting, shortness of breath, nor any other constitutional signs nor symptoms.    No other pedal complaints at this time.      Past Medical History:   Diagnosis Date   • Anxiety disorder 05/05/2021   • Arthritis 09/01/2021   • Bunion 1990   • Cancer ([facility]) 01/01/2018    skin   • Chronic pain 05/05/2021   • Claustrophobia 01/01/1990   • COPD (chronic obstructive pulmonary disease) ([facility]) 10/01/2021   • CTS (carpal tunnel syndrome) 01/01/1990   • Depression 01/2011   • Difficulty walking 2022   • Headache 09/072021   • Hyperlipemia    • Hyperlipidemia 05/05/2021   • Ingrown toenail 1977   • Insomnia, unspecified 05/05/2021   • Low back pain 02/01/2021   • MRSA (methicillin resistant Staphylococcus aureus) 06/01/2021   • Osteopenia 01/01/2010   • Post menopausal syndrome 05/05/2021   • Thoracic disc disorder 02/01/2021   • Vitamin D deficiency 05/05/2021     Past Surgical History:   Procedure Laterality Date   • APPENDECTOMY     • CARPAL TUNNEL RELEASE  03/01/1990   • COLONOSCOPY     • HYSTERECTOMY     •  TONSILECTOMY, ADENOIDECTOMY, BILATERAL MYRINGOTOMY AND TUBES     • TONSILLECTOMY     • TUBAL ABDOMINAL LIGATION       Family History   Problem Relation Age of Onset   • Arthritis Mother    • Anxiety disorder Mother    • Hearing loss Mother    • Hyperlipidemia Mother    • Heart disease Father    • Alcohol abuse Father    • Early death Father    • Pancreatic cancer Other    • Diabetes Other         MELLITUS   • Cancer Maternal Grandfather    • Early death Maternal Grandfather    • Cancer Maternal Grandmother    • Cancer Maternal Uncle    • COPD Paternal Uncle    • Heart disease Paternal Uncle    • Depression Son    • Diabetes Brother    • Hyperlipidemia Brother    • Hyperlipidemia Paternal Uncle      Social History     Socioeconomic History   • Marital status:    Tobacco Use   • Smoking status: Current Every Day Smoker     Packs/day: 0.50     Years: 35.00     Pack years: 17.50     Types: Cigarettes     Start date: 1/1/1986   • Smokeless tobacco: Never Used   • Tobacco comment: SMOKED FOR 21-30 YEARS   Vaping Use   • Vaping Use: Never used   Substance and Sexual Activity   • Alcohol use: Yes     Alcohol/week: 2.0 standard drinks     Types: 2 Cans of beer per week     Comment: CURRENT SOME DAY, DRINKS LESS THAN 1 DRINK PER DAY, HAS BEEN DRINKING FOR 21-30 YEARS    • Drug use: Never   • Sexual activity: Not Currently     Partners: Male     Birth control/protection: None     Allergies   Allergen Reactions   • Penicillins Hives   • Cyclobenzaprine Itching     Current Outpatient Medications   Medication Sig Dispense Refill   • alendronate (FOSAMAX) 70 MG tablet Take 1 tablet by mouth Every 7 (Seven) Days. 12 tablet 3   • ammonium lactate (LAC-HYDRIN) 12 % lotion ammonium lactate 12 % lotion     • betamethasone dipropionate (DIPROLENE) 0.05 % ointment      • busPIRone (BUSPAR) 5 MG tablet Take 1 tablet by mouth 3 (Three) Times a Day. 90 tablet 3   • calcium carbonate (OS-PATTIE) 1250 (500 Ca) MG tablet      • clobetasol  (TEMOVATE) 0.05 % ointment      • clonazePAM (KlonoPIN) 1 MG tablet Take 1 tablet by mouth At Night As Needed for Anxiety. 30 tablet 2   • gabapentin (NEURONTIN) 300 MG capsule Take 1 capsule by mouth 3 (Three) Times a Day. 90 capsule 1   • HYDROcodone-acetaminophen (NORCO) 5-325 MG per tablet Take 1 tablet by mouth Every 8 (Eight) Hours As Needed for Severe Pain . 21 tablet 0   • hydrocortisone 2.5 % cream      • hydroxychloroquine (PLAQUENIL) 200 MG tablet      • mycophenolate (CELLCEPT) 500 MG tablet mycophenolate mofetil 500 mg tablet     • Omega-3 Fatty Acids (fish oil) 1000 MG capsule capsule      • ondansetron ODT (ZOFRAN-ODT) 4 MG disintegrating tablet Every 12 (Twelve) Hours.     • simvastatin (ZOCOR) 20 MG tablet Take 1 tablet by mouth Every Night. 90 tablet 3   • tiotropium (Spiriva HandiHaler) 18 MCG per inhalation capsule Place 1 capsule into inhaler and inhale Daily. 28 capsule 11   • traMADol (ULTRAM) 50 MG tablet Take 1 tablet by mouth Daily. 7 tablet 0   • triamcinolone (KENALOG) 0.1 % cream        No current facility-administered medications for this visit.     Review of Systems   Constitutional: Negative.    Skin:        Dry skin throughout feet and heels bilaterally.   All other systems reviewed and are negative.      OBJECTIVE     Vitals:    03/15/22 0854   BP: 117/77   Pulse: 71   Temp: 97.5 °F (36.4 °C)   SpO2: 99%       WBC   Date Value Ref Range Status   05/14/2021 5.05 4.80 - 10.80 10*3/uL Final     RBC   Date Value Ref Range Status   05/14/2021 4.83 4.20 - 5.40 10*6/uL Final     Hemoglobin   Date Value Ref Range Status   05/14/2021 14.8 12.0 - 16.0 g/dL Final     Hematocrit   Date Value Ref Range Status   05/14/2021 44.2 37.0 - 47.0 % Final     MCV   Date Value Ref Range Status   05/14/2021 91.5 81.0 - 99.0 fL Final     MCH   Date Value Ref Range Status   05/14/2021 30.6 27.0 - 31.0 pg Final     MCHC   Date Value Ref Range Status   05/14/2021 33.5 33.0 - 37.0 Final     RDW   Date Value Ref  Range Status   05/14/2021 14.4 11.7 - 14.4 % Final     RDW-SD   Date Value Ref Range Status   05/14/2021 48.4 (H) 36.4 - 46.3 fL Final     MPV   Date Value Ref Range Status   05/14/2021 12.8 (H) 9.4 - 12.3 fL Final     Platelets   Date Value Ref Range Status   05/14/2021 160 130 - 400 10*3/uL Final     Neutrophil Rel %   Date Value Ref Range Status   05/14/2021 46.1 30.0 - 85.0 % Final     Lymphocyte Rel %   Date Value Ref Range Status   05/14/2021 37.2 20.0 - 45.0 % Final     Monocyte Rel %   Date Value Ref Range Status   05/14/2021 14.5 (H) 3.0 - 10.0 % Final     Eosinophil Rel %   Date Value Ref Range Status   05/14/2021 1.0 0.0 - 7.0 % Final     Basophil Rel %   Date Value Ref Range Status   05/14/2021 1.0 0.0 - 3.0 % Final     Neutrophils Absolute   Date Value Ref Range Status   05/14/2021 2.33 2.00 - 8.00 10*3/uL Final     Lymphocytes Absolute   Date Value Ref Range Status   05/14/2021 1.88 1.00 - 5.00 10*3/uL Final     Monocytes Absolute   Date Value Ref Range Status   05/14/2021 0.73 0.20 - 1.20 10*3/uL Final     Eosinophils Absolute   Date Value Ref Range Status   05/14/2021 0.05 0.00 - 0.70 10*3/uL Final     Basophils Absolute   Date Value Ref Range Status   05/14/2021 0.05 0.00 - 0.20 10*3/uL Final     NRBC   Date Value Ref Range Status   05/14/2021 0.00 0.00 - 0.70 % Final         Lab Results   Component Value Date    GLUCOSE 88 08/06/2021    BUN 9 08/06/2021    CREATININE 0.71 08/06/2021    EGFRIFNONA 84 08/06/2021    BCR 12.7 08/06/2021    K 5.0 08/06/2021    CO2 28.2 08/06/2021    CALCIUM 9.0 08/06/2021    ALBUMIN 4.00 08/06/2021    LABIL2 1.1 (L) 05/14/2021    AST 24 08/06/2021    ALT 17 08/06/2021       Patient seen in no apparent distress.      PHYSICAL EXAM:     Foot/Ankle Exam:       General:   Appearance comment:  Chronically ill  Orientation: AAOx3    Affect: appropriate    Gait: unimpaired    Shoe Gear:  Casual shoes    VASCULAR      Right Foot Vascularity   Normal vascular exam    Dorsalis  pedis:  1+  Posterior tibial:  1+  Skin Temperature: cool    Edema Grading:  None  CFT:  4 (Raynaud's phenomenon at distal toes)  Pedal Hair Growth:  Absent  Varicosities: moderate varicosities       Left Foot Vascularity   Normal vascular exam    Dorsalis pedis:  1+  Posterior tibial:  1+  Skin Temperature: cool    Edema Grading:  None  CFT:  4 (Raynaud's phenomenon at distal toes)  Pedal Hair Growth:  Absent  Varicosities: moderate varicosities        NEUROLOGIC     Right Foot Neurologic   Normal sensation    Light touch sensation:  Normal  Vibratory sensation:  Normal  Hot/Cold sensation: normal    Protective Sensation using [place] Monofilament:  10     Left Foot Neurologic   Normal sensation    Light touch sensation:  Normal  Vibratory sensation:  Normal  Hot/cold sensation: normal    Protective Sensation using [place] Monofilament:  10     MUSCULOSKELETAL      Right Foot Musculoskeletal   Hallux valgus: Yes       Left Foot Musculoskeletal   Hallux valgus: Yes       MUSCLE STRENGTH     Right Foot Muscle Strength   Normal strength    Foot dorsiflexion:  5  Foot plantar flexion:  5  Foot inversion:  5  Foot eversion:  5     Left Foot Muscle Strength   Foot dorsiflexion:  5  Foot plantar flexion:  5  Foot inversion:  5  Foot eversion:  5     RANGE OF MOTION      Right Foot Range of Motion   Foot and ankle ROM within normal limits       Left Foot Range of Motion   Foot and ankle ROM within normal limits       DERMATOLOGIC     Right Foot Dermatologic   Skin: dry skin    Nails: normal       Left Foot Dermatologic   Skin: dry skin    Nails: normal        ASSESSMENT/PLAN     Diagnoses and all orders for this visit:    1. Anhidrosis (Primary)    2. Foot pain, bilateral    Rx:  Topical, compounded, an hydrocyst medication was written; see attached prescription.    Comprehensive lower extremity examination and evaluation was performed.    Discussed findings and treatment plan including risks, benefits,  and treatment options with patient in detail. Patient agreed with treatment plan.    Medications and allergies reviewed.  Reviewed available lab values along with other pertinent labs.  These were discussed with the patient.    An After Visit Summary was printed and given to the patient at discharge, including (if requested) any available informative/educational handouts regarding diagnosis, treatment, or medications. All questions were answered to patient/family satisfaction. Should symptoms fail to improve or worsen they agree to call or return to clinic or to go to the Emergency Department. Discussed the importance of following up with any needed screening tests/labs/specialist appointments and any requested follow-up recommended by me today. Importance of maintaining follow-up discussed and patient accepts that missed appointments can delay diagnosis and potentially lead to worsening of conditions.    Return if symptoms worsen or fail to improve., or sooner if acute issues arise.    This document has been electronically signed by Kevin Oneil DPM on March 15, 2022 09:29 EDT

## 2022-03-18 ENCOUNTER — PATIENT ROUNDING (BHMG ONLY) (OUTPATIENT)
Dept: PODIATRY | Facility: CLINIC | Age: 62
End: 2022-03-18

## 2022-03-18 NOTE — PROGRESS NOTES
March 18, 2022    Hello, may I speak with Danielle Manuel?    My name is Lindsey      I am  with [facility] [place] [facility]  [address]   [place] [address] [phone].    Before we get started may I verify your date of birth? 1960    I am calling to officially welcome you to our practice and ask about your recent visit. Is this a good time to talk? yes    Tell me about your visit with us. What things went well?  wait time was short, Dr. Oneil explained very well, down to earth, went good/smooth        We're always looking for ways to make our patients' experiences even better. Do you have recommendations on ways we may improve?  no    Overall were you satisfied with your first visit to our practice? yes       I appreciate you taking the time to speak with me today. Is there anything else I can do for you? no      Thank you, and have a great day.

## 2022-03-22 ENCOUNTER — HOSPITAL ENCOUNTER (OUTPATIENT)
Dept: CT IMAGING | Facility: HOSPITAL | Age: 62
Discharge: HOME | End: 2022-03-22
Admitting: FAMILY MEDICINE

## 2022-03-22 DIAGNOSIS — R91.1 NODULE OF UPPER LOBE OF RIGHT LUNG: ICD-10-CM

## 2022-03-22 LAB
CREAT BLDA-MCNC: 0.5 MG/DL
EGFRCR SERPLBLD CKD-EPI 2021: 107.5 ML/MIN/1.73

## 2022-03-22 PROCEDURE — 71260 CT THORAX DX C+: CPT

## 2022-03-22 PROCEDURE — 82565 ASSAY OF CREATININE: CPT

## 2022-03-22 PROCEDURE — 0 IOPAMIDOL PER 1 ML: Performed by: FAMILY MEDICINE

## 2022-03-22 RX ADMIN — IOPAMIDOL 100 ML: 755 INJECTION, SOLUTION INTRAVENOUS at 08:38

## 2022-03-24 ENCOUNTER — OFFICE VISIT (OUTPATIENT)
Dept: FAMILY MEDICINE CLINIC | Facility: CLINIC | Age: 62
End: 2022-03-24

## 2022-03-24 VITALS
SYSTOLIC BLOOD PRESSURE: 116 MMHG | TEMPERATURE: 97.8 F | OXYGEN SATURATION: 96 % | DIASTOLIC BLOOD PRESSURE: 84 MMHG | RESPIRATION RATE: 18 BRPM | HEART RATE: 90 BPM | BODY MASS INDEX: 20.76 KG/M2 | HEIGHT: 66 IN | WEIGHT: 129.2 LBS

## 2022-03-24 DIAGNOSIS — K22.89 ESOPHAGEAL THICKENING: Primary | Chronic | ICD-10-CM

## 2022-03-24 DIAGNOSIS — M80.00XD AGE-RELATED OSTEOPOROSIS WITH CURRENT PATHOLOGICAL FRACTURE WITH ROUTINE HEALING: Chronic | ICD-10-CM

## 2022-03-24 DIAGNOSIS — F41.1 GAD (GENERALIZED ANXIETY DISORDER): Chronic | ICD-10-CM

## 2022-03-24 DIAGNOSIS — M32.9 LUPUS: Chronic | ICD-10-CM

## 2022-03-24 DIAGNOSIS — G89.4 CHRONIC PAIN DISORDER: Chronic | ICD-10-CM

## 2022-03-24 DIAGNOSIS — F51.01 PRIMARY INSOMNIA: Chronic | ICD-10-CM

## 2022-03-24 PROCEDURE — 99214 OFFICE O/P EST MOD 30 MIN: CPT | Performed by: FAMILY MEDICINE

## 2022-04-02 PROBLEM — G47.00 INSOMNIA, UNSPECIFIED: Chronic | Status: ACTIVE | Noted: 2021-05-05

## 2022-04-02 PROBLEM — K22.89 ESOPHAGEAL THICKENING: Status: ACTIVE | Noted: 2022-04-02

## 2022-04-02 PROBLEM — F41.1 GAD (GENERALIZED ANXIETY DISORDER): Chronic | Status: ACTIVE | Noted: 2021-05-05

## 2022-04-02 PROBLEM — M47.812 CERVICAL SPONDYLOSIS: Chronic | Status: ACTIVE | Noted: 2021-11-02

## 2022-04-02 PROBLEM — M80.00XD AGE-RELATED OSTEOPOROSIS WITH CURRENT PATHOLOGICAL FRACTURE WITH ROUTINE HEALING: Chronic | Status: ACTIVE | Noted: 2021-08-04

## 2022-04-05 ENCOUNTER — OFFICE VISIT (OUTPATIENT)
Dept: GASTROENTEROLOGY | Facility: CLINIC | Age: 62
End: 2022-04-05

## 2022-04-05 VITALS
HEART RATE: 73 BPM | HEIGHT: 67 IN | BODY MASS INDEX: 20.67 KG/M2 | DIASTOLIC BLOOD PRESSURE: 79 MMHG | SYSTOLIC BLOOD PRESSURE: 119 MMHG | WEIGHT: 131.7 LBS

## 2022-04-05 DIAGNOSIS — K22.89 ESOPHAGEAL THICKENING: ICD-10-CM

## 2022-04-05 DIAGNOSIS — R93.3 ABNORMAL FINDING ON GI TRACT IMAGING: Primary | ICD-10-CM

## 2022-04-05 PROCEDURE — 99204 OFFICE O/P NEW MOD 45 MIN: CPT | Performed by: NURSE PRACTITIONER

## 2022-04-05 NOTE — PROGRESS NOTES
Patient Name: Danielle Manuel   Visit Date: 04/05/2022   Patient ID: [id number]  Provider: DREW Gamez    Sex: female  Location:  Location Address:  Location Phone: [address] [place] [address] [phone]    YOB: 1960      Primary Care Provider Teofilo Ordonez DO      Referring Provider: No ref. provider found        Chief Complaint  Jaundice (Thickening of the esophagus )    History of Present Illness  Danielle Manuel is a 61 y.o. who presents to [facility] on referral from No ref. provider found for a gastroenterology evaluation of Jaundice (Thickening of the esophagus ).    Ms. Manuel presents today due to thickening of mid to distal esophageal wall noted on chest CT.  Reports the finding was incidental as chest CT was done for lung cancer screening.  She denies having any heartburn, nausea, or vomiting.  Occasionally will have pharyngeal dysphagia however only with medication.  Appetite and weight stable.  No prior EGD.      Labs Result Review Imaging    Past Medical History:   Diagnosis Date   • Anxiety disorder 05/05/2021   • Arthritis 09/01/2021   • Bunion 1990   • Cancer (HCC) 01/01/2018    skin   • Chronic pain 05/05/2021   • Claustrophobia 01/01/1990   • COPD (chronic obstructive pulmonary disease) (HCC) 10/01/2021   • CTS (carpal tunnel syndrome) 01/01/1990   • Depression 01/2011   • Difficulty walking 2022   • Headache 09/072021   • Hyperlipemia    • Hyperlipidemia 05/05/2021   • Ingrown toenail 1977   • Insomnia, unspecified 05/05/2021   • Low back pain 02/01/2021   • MRSA (methicillin resistant Staphylococcus aureus) 06/01/2021   • Osteopenia 01/01/2010   • Post menopausal syndrome 05/05/2021   • Thoracic disc disorder 02/01/2021   • Vitamin D deficiency 05/05/2021       Past Surgical History:   Procedure Laterality Date   • APPENDECTOMY     • CARPAL TUNNEL RELEASE  03/01/1990   • COLONOSCOPY     • HYSTERECTOMY      • TONSILECTOMY, ADENOIDECTOMY, BILATERAL MYRINGOTOMY AND TUBES     • TONSILLECTOMY     • TUBAL ABDOMINAL LIGATION           Current Outpatient Medications:   •  alendronate (FOSAMAX) 70 MG tablet, Take 1 tablet by mouth Every 7 (Seven) Days., Disp: 12 tablet, Rfl: 3  •  ammonium lactate (LAC-HYDRIN) 12 % lotion, ammonium lactate 12 % lotion, Disp: , Rfl:   •  betamethasone dipropionate (DIPROLENE) 0.05 % ointment, , Disp: , Rfl:   •  busPIRone (BUSPAR) 5 MG tablet, Take 1 tablet by mouth 3 (Three) Times a Day., Disp: 90 tablet, Rfl: 3  •  calcium carbonate (OS-PATTIE) 1250 (500 Ca) MG tablet, , Disp: , Rfl:   •  clobetasol (TEMOVATE) 0.05 % ointment, , Disp: , Rfl:   •  clonazePAM (KlonoPIN) 1 MG tablet, Take 1 tablet by mouth At Night As Needed for Anxiety., Disp: 30 tablet, Rfl: 2  •  gabapentin (NEURONTIN) 300 MG capsule, Take 1 capsule by mouth 3 (Three) Times a Day., Disp: 90 capsule, Rfl: 1  •  HYDROcodone-acetaminophen (NORCO) 5-325 MG per tablet, Take 1 tablet by mouth Every 8 (Eight) Hours As Needed for Severe Pain ., Disp: 21 tablet, Rfl: 0  •  hydrocortisone 2.5 % cream, , Disp: , Rfl:   •  hydroxychloroquine (PLAQUENIL) 200 MG tablet, , Disp: , Rfl:   •  mycophenolate (CELLCEPT) 500 MG tablet, mycophenolate mofetil 500 mg tablet, Disp: , Rfl:   •  Omega-3 Fatty Acids (fish oil) 1000 MG capsule capsule, , Disp: , Rfl:   •  ondansetron ODT (ZOFRAN-ODT) 4 MG disintegrating tablet, Every 12 (Twelve) Hours., Disp: , Rfl:   •  tiotropium (Spiriva HandiHaler) 18 MCG per inhalation capsule, Place 1 capsule into inhaler and inhale Daily., Disp: 28 capsule, Rfl: 11  •  triamcinolone (KENALOG) 0.1 % cream, , Disp: , Rfl:      Allergies   Allergen Reactions   • Penicillins Hives   • Cyclobenzaprine Itching       Family History   Problem Relation Age of Onset   • Arthritis Mother    • Anxiety disorder Mother    • Hearing loss Mother    • Hyperlipidemia Mother    • Heart disease Father    • Alcohol abuse Father    •  "Early death Father    • Pancreatic cancer Other    • Diabetes Other         MELLITUS   • Cancer Maternal Grandfather    • Early death Maternal Grandfather    • Cancer Maternal Grandmother    • Cancer Maternal Uncle    • COPD Paternal Uncle    • Heart disease Paternal Uncle    • Depression Son    • Diabetes Brother    • Hyperlipidemia Brother    • Hyperlipidemia Paternal Uncle         Social History     Social History Narrative   • Not on file         Objective     Review of Systems   Constitutional: Negative for appetite change and unexpected weight loss.   Gastrointestinal: Negative for nausea, vomiting, GERD and indigestion.        Vital Signs:   /79 (BP Location: Left arm, Patient Position: Sitting, Cuff Size: Small Adult)   Pulse 73   Ht 170.2 cm (67\")   Wt 59.7 kg (131 lb 11.2 oz)   BMI 20.63 kg/m²       Physical Exam  Constitutional:       General: She is not in acute distress.     Appearance: Normal appearance. She is well-developed and normal weight.   HENT:      Head: Normocephalic and atraumatic.   Eyes:      Conjunctiva/sclera: Conjunctivae normal.      Pupils: Pupils are equal, round, and reactive to light.      Visual Fields: Right eye visual fields normal and left eye visual fields normal.   Cardiovascular:      Rate and Rhythm: Normal rate and regular rhythm.      Heart sounds: Normal heart sounds.   Pulmonary:      Effort: Pulmonary effort is normal. No retractions.      Breath sounds: Normal breath sounds and air entry.   Abdominal:      General: Bowel sounds are normal. There is no distension.      Palpations: Abdomen is soft.      Tenderness: There is no abdominal tenderness.      Comments: No appreciable hepatosplenomegaly or ascites   Musculoskeletal:         General: Normal range of motion.      Cervical back: Normal range of motion and neck supple.   Skin:     General: Skin is warm and dry.   Neurological:      Mental Status: She is alert and oriented to person, place, and time. "   Psychiatric:         Mood and Affect: Mood and affect normal.         Behavior: Behavior normal.         Result Review :   The following data was reviewed by: DREW Gamez on 04/05/2022:  CBC w/diff    CBC w/Diff 5/14/21   WBC 5.05   RBC 4.83   Hemoglobin 14.8   Hematocrit 44.2   MCV 91.5   MCH 30.6   MCHC 33.5   RDW 14.4   Platelets 160   Neutrophil Rel % 46.1   Lymphocyte Rel % 37.2   Monocyte Rel % 14.5 (A)   Eosinophil Rel % 1.0   Basophil Rel % 1.0   (A) Abnormal value            CMP    CMP 5/14/21 8/6/21 3/22/22   Glucose 79 88    BUN 7 9    Creatinine 0.70 0.71 0.50   eGFR Non African Am  84    Sodium 133 (A) 137    Potassium 3.9 5.0    Chloride 97 (A) 100    Calcium 9.2 9.0    Albumin 4.2 4.00    Total Bilirubin 1.13 0.8    Alkaline Phosphatase 96 76    AST (SGOT) 36 24    ALT (SGPT) 27 17    (A) Abnormal value       Comments are available for some flowsheets but are not being displayed.           No results found for: LIPASE, AMYLASE, IRON, TIBC, LABIRON, TRANSFERRIN, FERRITIN, SEDRATE, CRP, AFPTM, DSDNA, EXPANDEDENA, SMOOTHMUSCAB, CERULOPLSM, LABIMMURE, TOTIGGREF, IGA, IGM, MITOAB, HEPBSAG, HEPAIGM, HEPBIGMCORE, HEPCVIRUSABY, PROTIME, INR, AMMONIA          Assessment and Plan    Diagnoses and all orders for this visit:    1. Abnormal finding on GI tract imaging (Primary)  -     Case Request; Standing  -     COVID PRE-OP / PRE-PROCEDURE SCREENING ORDER (NO ISOLATION) - Swab, Nasopharynx; Future  -     Case Request    2. Esophageal thickening    Other orders  -     Follow Anesthesia Guidelines / Protocol; Future  -     Obtain Informed Consent; Future      ESOPHAGOGASTRODUODENOSCOPY (N/A)       Follow Up   Return for Follow up after procedure.  Patient was given instructions and counseling regarding her condition or for health maintenance advice. Please see specific information pulled into the AVS if appropriate.

## 2022-05-12 ENCOUNTER — OFFICE VISIT (OUTPATIENT)
Dept: FAMILY MEDICINE CLINIC | Facility: CLINIC | Age: 62
End: 2022-05-12

## 2022-05-12 VITALS
OXYGEN SATURATION: 100 % | HEART RATE: 80 BPM | WEIGHT: 130.6 LBS | HEIGHT: 67 IN | SYSTOLIC BLOOD PRESSURE: 132 MMHG | RESPIRATION RATE: 18 BRPM | TEMPERATURE: 99.1 F | DIASTOLIC BLOOD PRESSURE: 91 MMHG | BODY MASS INDEX: 20.5 KG/M2

## 2022-05-12 DIAGNOSIS — S22.000G THORACIC COMPRESSION FRACTURE, WITH DELAYED HEALING, SUBSEQUENT ENCOUNTER: ICD-10-CM

## 2022-05-12 DIAGNOSIS — G89.4 CHRONIC PAIN DISORDER: ICD-10-CM

## 2022-05-12 DIAGNOSIS — Z12.11 ENCOUNTER FOR SCREENING FOR MALIGNANT NEOPLASM OF COLON: Primary | ICD-10-CM

## 2022-05-12 DIAGNOSIS — F41.9 ANXIETY DISORDER, UNSPECIFIED TYPE: Chronic | ICD-10-CM

## 2022-05-12 DIAGNOSIS — L03.119 CELLULITIS OF LOWER EXTREMITY, UNSPECIFIED LATERALITY: ICD-10-CM

## 2022-05-12 PROCEDURE — 99214 OFFICE O/P EST MOD 30 MIN: CPT | Performed by: FAMILY MEDICINE

## 2022-05-12 RX ORDER — HYDROCODONE BITARTRATE AND ACETAMINOPHEN 5; 325 MG/1; MG/1
1 TABLET ORAL 2 TIMES DAILY PRN
Qty: 60 TABLET | Refills: 0 | Status: SHIPPED | OUTPATIENT
Start: 2022-05-12 | End: 2022-08-03

## 2022-05-12 RX ORDER — CEPHALEXIN 500 MG/1
500 CAPSULE ORAL 2 TIMES DAILY
Qty: 14 CAPSULE | Refills: 0 | Status: SHIPPED | OUTPATIENT
Start: 2022-05-12 | End: 2022-06-16

## 2022-05-12 RX ORDER — ERGOCALCIFEROL 1.25 MG/1
50000 CAPSULE ORAL
COMMUNITY
Start: 2022-04-19

## 2022-05-12 RX ORDER — TRAMADOL HYDROCHLORIDE 50 MG/1
50 TABLET ORAL DAILY PRN
Qty: 30 TABLET | Refills: 2 | Status: SHIPPED | OUTPATIENT
Start: 2022-05-12

## 2022-05-12 RX ORDER — CLONAZEPAM 1 MG/1
1 TABLET ORAL NIGHTLY PRN
Qty: 30 TABLET | Refills: 2 | Status: SHIPPED | OUTPATIENT
Start: 2022-05-12 | End: 2022-08-17 | Stop reason: SDUPTHER

## 2022-05-12 NOTE — PROGRESS NOTES
"Chief Complaint  Follow-up (Follow up on medications to discuss Buspar), Request mammogram order, and Check sore on her right knee    Subjective          Danielle Manuel presents to [facility]  History of Present Illness    Patient presents for follow-up on medications discussing BuSpar needs a mammogram and has a sore on her knee no discharge warmth but given her risk factors we will treat for infection      Objective   Vital Signs:  /91 (BP Location: Left arm, Patient Position: Sitting)   Pulse 80   Temp 99.1 °F (37.3 °C)   Resp 18   Ht 170.2 cm (67\")   Wt 59.2 kg (130 lb 9.6 oz)   SpO2 100%   BMI 20.45 kg/m²     BMI is within normal parameters. No follow-up required.      Physical Exam  Vitals reviewed.   Constitutional:       Appearance: Normal appearance. She is well-developed.   HENT:      Head: Normocephalic and atraumatic.      Right Ear: External ear normal.      Left Ear: External ear normal.      Nose: Nose normal.   Eyes:      Conjunctiva/sclera: Conjunctivae normal.      Pupils: Pupils are equal, round, and reactive to light.   Cardiovascular:      Rate and Rhythm: Normal rate.   Pulmonary:      Effort: Pulmonary effort is normal.      Breath sounds: Normal breath sounds.   Abdominal:      General: There is no distension.   Skin:     General: Skin is warm and dry.      Comments: Sore on right knee nickel size   Neurological:      General: No focal deficit present.      Mental Status: She is alert and oriented to person, place, and time.   Psychiatric:         Mood and Affect: Mood and affect normal.         Behavior: Behavior normal.         Thought Content: Thought content normal.         Judgment: Judgment normal.        Result Review :   The following data was reviewed by: Teofilo Ordonez DO on 05/12/2022:              Assessment and Plan    Diagnoses and all orders for this visit:    1. Encounter for screening for malignant neoplasm of colon " (Primary)  -     Cancel: Mammo Screening Bilateral With CAD; Future    2. Thoracic compression fracture, with delayed healing, subsequent encounter  -     HYDROcodone-acetaminophen (NORCO) 5-325 MG per tablet; Take 1 tablet by mouth 2 (Two) Times a Day As Needed for Severe Pain .  Dispense: 60 tablet; Refill: 0    3. Anxiety disorder, unspecified type  Comments:  Stable; UDS and AYLA reviewed and appropriate. Refill Klonopin 1mg daily prn #30/0. Refill Celexa . Follow up in 3mths.  Orders:  -     clonazePAM (KlonoPIN) 1 MG tablet; Take 1 tablet by mouth At Night As Needed for Anxiety.  Dispense: 30 tablet; Refill: 2    4. Chronic pain disorder  -     traMADol (ULTRAM) 50 MG tablet; Take 1 tablet by mouth Daily As Needed for Moderate Pain .  Dispense: 30 tablet; Refill: 2    5. Cellulitis of lower extremity, unspecified laterality    Other orders  -     cephalexin (Keflex) 500 MG capsule; Take 1 capsule by mouth 2 (Two) Times a Day.  Dispense: 14 capsule; Refill: 0  -     silver sulfadiazine (Silvadene) 1 % cream; Apply 1 application topically to the appropriate area as directed 2 (Two) Times a Day.  Dispense: 50 g; Refill: 0      Renew medications as above counseled on monitoring signs and symptoms of worsening infection treatment preventatively with antibiotics and Silvadene         Follow Up   No follow-ups on file.  Patient was given instructions and counseling regarding her condition or for health maintenance advice. Please see specific information pulled into the AVS if appropriate.       Answers for HPI/ROS submitted by the patient on 5/12/2022  Please describe your symptoms.: Questions  Have you had these symptoms before?: No  How long have you been having these symptoms?: 1-4 days  Please list any medications you are currently taking for this condition.: Na  Please describe any probable cause for these symptoms. : Na  What is the primary reason for your visit?: Other

## 2022-05-16 ENCOUNTER — TRANSCRIBE ORDERS (OUTPATIENT)
Dept: ADMINISTRATIVE | Facility: HOSPITAL | Age: 62
End: 2022-05-16

## 2022-05-16 DIAGNOSIS — Z12.31 VISIT FOR SCREENING MAMMOGRAM: Primary | ICD-10-CM

## 2022-06-16 ENCOUNTER — LAB (OUTPATIENT)
Dept: LAB | Facility: HOSPITAL | Age: 62
End: 2022-06-16

## 2022-06-16 ENCOUNTER — OFFICE VISIT (OUTPATIENT)
Dept: FAMILY MEDICINE CLINIC | Facility: CLINIC | Age: 62
End: 2022-06-16

## 2022-06-16 VITALS
OXYGEN SATURATION: 97 % | DIASTOLIC BLOOD PRESSURE: 90 MMHG | TEMPERATURE: 100.5 F | WEIGHT: 125 LBS | RESPIRATION RATE: 18 BRPM | SYSTOLIC BLOOD PRESSURE: 137 MMHG | HEART RATE: 87 BPM | BODY MASS INDEX: 19.62 KG/M2 | HEIGHT: 67 IN

## 2022-06-16 DIAGNOSIS — K29.00 ACUTE GASTRITIS WITHOUT HEMORRHAGE, UNSPECIFIED GASTRITIS TYPE: ICD-10-CM

## 2022-06-16 DIAGNOSIS — M80.00XD AGE-RELATED OSTEOPOROSIS WITH CURRENT PATHOLOGICAL FRACTURE WITH ROUTINE HEALING: ICD-10-CM

## 2022-06-16 DIAGNOSIS — M32.9 LUPUS: Primary | ICD-10-CM

## 2022-06-16 DIAGNOSIS — F41.1 GAD (GENERALIZED ANXIETY DISORDER): ICD-10-CM

## 2022-06-16 DIAGNOSIS — F51.01 PRIMARY INSOMNIA: ICD-10-CM

## 2022-06-16 DIAGNOSIS — M32.9 LUPUS: ICD-10-CM

## 2022-06-16 DIAGNOSIS — K22.89 ESOPHAGEAL THICKENING: ICD-10-CM

## 2022-06-16 DIAGNOSIS — G89.4 CHRONIC PAIN DISORDER: ICD-10-CM

## 2022-06-16 LAB
25(OH)D3 SERPL-MCNC: 33.9 NG/ML (ref 30–100)
BACTERIA UR QL AUTO: NORMAL /HPF
BASOPHILS # BLD AUTO: 0.04 10*3/MM3 (ref 0–0.2)
BASOPHILS NFR BLD AUTO: 0.8 % (ref 0–1.5)
BILIRUB UR QL STRIP: NEGATIVE
C3 SERPL-MCNC: 104 MG/DL (ref 82–167)
C4 SERPL-MCNC: 14 MG/DL (ref 14–44)
CHOLEST SERPL-MCNC: 226 MG/DL (ref 0–200)
CLARITY UR: CLEAR
COLOR UR: YELLOW
CRP SERPL-MCNC: <0.3 MG/DL (ref 0–0.5)
DEPRECATED RDW RBC AUTO: 42.3 FL (ref 37–54)
EOSINOPHIL # BLD AUTO: 0.02 10*3/MM3 (ref 0–0.4)
EOSINOPHIL NFR BLD AUTO: 0.4 % (ref 0.3–6.2)
ERYTHROCYTE [DISTWIDTH] IN BLOOD BY AUTOMATED COUNT: 12.5 % (ref 12.3–15.4)
ERYTHROCYTE [SEDIMENTATION RATE] IN BLOOD: 21 MM/HR (ref 0–30)
GLUCOSE UR STRIP-MCNC: NEGATIVE MG/DL
HCT VFR BLD AUTO: 41.9 % (ref 34–46.6)
HDLC SERPL-MCNC: 76 MG/DL (ref 40–60)
HGB BLD-MCNC: 14.2 G/DL (ref 12–15.9)
HGB UR QL STRIP.AUTO: NEGATIVE
HYALINE CASTS UR QL AUTO: NORMAL /LPF
IMM GRANULOCYTES # BLD AUTO: 0.02 10*3/MM3 (ref 0–0.05)
IMM GRANULOCYTES NFR BLD AUTO: 0.4 % (ref 0–0.5)
KETONES UR QL STRIP: NEGATIVE
LDLC SERPL CALC-MCNC: 139 MG/DL (ref 0–100)
LDLC/HDLC SERPL: 1.8 {RATIO}
LEUKOCYTE ESTERASE UR QL STRIP.AUTO: ABNORMAL
LYMPHOCYTES # BLD AUTO: 0.98 10*3/MM3 (ref 0.7–3.1)
LYMPHOCYTES NFR BLD AUTO: 19.2 % (ref 19.6–45.3)
MCH RBC QN AUTO: 31.6 PG (ref 26.6–33)
MCHC RBC AUTO-ENTMCNC: 33.9 G/DL (ref 31.5–35.7)
MCV RBC AUTO: 93.1 FL (ref 79–97)
MONOCYTES # BLD AUTO: 0.5 10*3/MM3 (ref 0.1–0.9)
MONOCYTES NFR BLD AUTO: 9.8 % (ref 5–12)
NEUTROPHILS NFR BLD AUTO: 3.54 10*3/MM3 (ref 1.7–7)
NEUTROPHILS NFR BLD AUTO: 69.4 % (ref 42.7–76)
NITRITE UR QL STRIP: NEGATIVE
NRBC BLD AUTO-RTO: 0 /100 WBC (ref 0–0.2)
PH UR STRIP.AUTO: 6.5 [PH] (ref 5–8)
PLATELET # BLD AUTO: 216 10*3/MM3 (ref 140–450)
PMV BLD AUTO: 12.1 FL (ref 6–12)
PROT UR QL STRIP: NEGATIVE
PTH-INTACT SERPL-MCNC: 50 PG/ML (ref 15–65)
RBC # BLD AUTO: 4.5 10*6/MM3 (ref 3.77–5.28)
RBC # UR STRIP: NORMAL /HPF
REF LAB TEST METHOD: NORMAL
SP GR UR STRIP: <=1.005 (ref 1–1.03)
SQUAMOUS #/AREA URNS HPF: NORMAL /HPF
T4 FREE SERPL-MCNC: 1.41 NG/DL (ref 0.93–1.7)
TRIGL SERPL-MCNC: 66 MG/DL (ref 0–150)
TSH SERPL DL<=0.05 MIU/L-ACNC: 0.81 UIU/ML (ref 0.27–4.2)
UROBILINOGEN UR QL STRIP: ABNORMAL
VLDLC SERPL-MCNC: 11 MG/DL (ref 5–40)
WBC # UR STRIP: NORMAL /HPF
WBC NRBC COR # BLD: 5.1 10*3/MM3 (ref 3.4–10.8)

## 2022-06-16 PROCEDURE — 83970 ASSAY OF PARATHORMONE: CPT

## 2022-06-16 PROCEDURE — 99214 OFFICE O/P EST MOD 30 MIN: CPT

## 2022-06-16 PROCEDURE — 85652 RBC SED RATE AUTOMATED: CPT

## 2022-06-16 PROCEDURE — 84439 ASSAY OF FREE THYROXINE: CPT

## 2022-06-16 PROCEDURE — 80053 COMPREHEN METABOLIC PANEL: CPT

## 2022-06-16 PROCEDURE — 86160 COMPLEMENT ANTIGEN: CPT

## 2022-06-16 PROCEDURE — 82306 VITAMIN D 25 HYDROXY: CPT

## 2022-06-16 PROCEDURE — 85025 COMPLETE CBC W/AUTO DIFF WBC: CPT

## 2022-06-16 PROCEDURE — 36415 COLL VENOUS BLD VENIPUNCTURE: CPT

## 2022-06-16 PROCEDURE — 81001 URINALYSIS AUTO W/SCOPE: CPT

## 2022-06-16 PROCEDURE — 86140 C-REACTIVE PROTEIN: CPT

## 2022-06-16 PROCEDURE — 96372 THER/PROPH/DIAG INJ SC/IM: CPT

## 2022-06-16 PROCEDURE — 86255 FLUORESCENT ANTIBODY SCREEN: CPT

## 2022-06-16 PROCEDURE — 84443 ASSAY THYROID STIM HORMONE: CPT

## 2022-06-16 PROCEDURE — 80061 LIPID PANEL: CPT

## 2022-06-16 RX ORDER — TRIAMCINOLONE ACETONIDE 40 MG/ML
60 INJECTION, SUSPENSION INTRA-ARTICULAR; INTRAMUSCULAR ONCE
Status: COMPLETED | OUTPATIENT
Start: 2022-06-16 | End: 2022-06-16

## 2022-06-16 RX ORDER — PANTOPRAZOLE SODIUM 40 MG/1
40 TABLET, DELAYED RELEASE ORAL DAILY
Qty: 10 TABLET | Refills: 0 | Status: SHIPPED | OUTPATIENT
Start: 2022-06-16 | End: 2022-06-24

## 2022-06-16 RX ORDER — METRONIDAZOLE 500 MG/1
500 TABLET ORAL 3 TIMES DAILY
Qty: 21 TABLET | Refills: 0 | Status: SHIPPED | OUTPATIENT
Start: 2022-06-16 | End: 2022-06-23

## 2022-06-16 RX ADMIN — TRIAMCINOLONE ACETONIDE 60 MG: 40 INJECTION, SUSPENSION INTRA-ARTICULAR; INTRAMUSCULAR at 14:08

## 2022-06-16 NOTE — PROGRESS NOTES
Chief Complaint   Patient presents with   • Headache   • Vomiting     Nausea and vomiting for over two weeks now, doesn't last all day, comes and goes.    • Nausea       Subjective          Danielle Manuel presents to [facility]    She is here for an acute visit. She started 2 weeks ago having nausea and vomiting. She states she couldn't keep anything down except water.     She also had a spot on her lip that started on Sunday and his now her entire bottom lip and half of her top lip. They are black and red and have white around them in spots.       Past History:  Medical History: has a past medical history of Anxiety disorder (05/05/2021), Arthritis (09/01/2021), Bunion (1990), Cancer ([facility]) (01/01/2018), Chronic pain (05/05/2021), Claustrophobia (01/01/1990), COPD (chronic obstructive pulmonary disease) ([facility]) (10/01/2021), CTS (carpal tunnel syndrome) (01/01/1990), Depression (01/2011), Difficulty walking (2022), Headache (09/072021), Hyperlipemia, Hyperlipidemia (05/05/2021), Ingrown toenail (1977), Insomnia, unspecified (05/05/2021), Low back pain (02/01/2021), MRSA (methicillin resistant Staphylococcus aureus) (06/01/2021), Osteopenia (01/01/2010), Post menopausal syndrome (05/05/2021), Thoracic disc disorder (02/01/2021), and Vitamin D deficiency (05/05/2021).   Surgical History: has a past surgical history that includes Appendectomy; Colonoscopy; Hysterectomy; Tonsilectomy, adenoidectomy, bilateral myringotomy and tubes; Carpal tunnel release (03/01/1990); Tonsillectomy; and Tubal ligation.   Family History: family history includes Alcohol abuse in her father; Anxiety disorder in her mother; Arthritis in her mother; COPD in her paternal uncle; Cancer in her maternal grandfather, maternal grandmother, and maternal uncle; Depression in her son; Diabetes in her brother and another family member; Early death in her father and maternal grandfather; Hearing loss in her mother;  "Heart disease in her father and paternal uncle; Hyperlipidemia in her brother, mother, and paternal uncle; Pancreatic cancer in an other family member.   Social History: reports that she has been smoking cigarettes. She started smoking about 36 years ago. She has a 18.00 pack-year smoking history. She has never used smokeless tobacco. She reports current alcohol use of about 2.0 standard drinks of alcohol per week. She reports that she does not use drugs.  Allergies: Penicillins and Cyclobenzaprine  (Not in a hospital admission)       Social History     Socioeconomic History   • Marital status:    Tobacco Use   • Smoking status: Current Every Day Smoker     Packs/day: 0.50     Years: 36.00     Pack years: 18.00     Types: Cigarettes     Start date: 1/1/1986   • Smokeless tobacco: Never Used   • Tobacco comment: SMOKED FOR 21-30 YEARS   Vaping Use   • Vaping Use: Never used   Substance and Sexual Activity   • Alcohol use: Yes     Alcohol/week: 2.0 standard drinks     Types: 2 Cans of beer per week     Comment: CURRENT SOME DAY, DRINKS LESS THAN 1 DRINK PER DAY, HAS BEEN DRINKING FOR 21-30 YEARS    • Drug use: Never   • Sexual activity: Not Currently     Partners: Male     Birth control/protection: None       There are no preventive care reminders to display for this patient.    Objective     Vital Signs:   /90 (BP Location: Left arm, Patient Position: Sitting)   Pulse 87   Temp 100.5 °F (38.1 °C) (Infrared)   Resp 18   Ht 170.2 cm (67\")   Wt 56.7 kg (125 lb)   SpO2 97%   BMI 19.58 kg/m²       Physical Exam  Constitutional:       Appearance: Normal appearance.   HENT:      Head:        Nose: Nose normal.      Mouth/Throat:      Mouth: Mucous membranes are moist.   Cardiovascular:      Rate and Rhythm: Normal rate and regular rhythm.      Pulses: Normal pulses.   Pulmonary:      Effort: Pulmonary effort is normal.      Breath sounds: Normal breath sounds.   Skin:     General: Skin is warm and dry. "   Neurological:      General: No focal deficit present.      Mental Status: She is alert and oriented to person, place, and time.   Psychiatric:         Mood and Affect: Mood normal.         Behavior: Behavior normal.          Review of Systems   Gastrointestinal: Positive for nausea and vomiting.   Neurological: Positive for headache.        Result Review :                 Assessment and Plan    Diagnoses and all orders for this visit:    1. Lupus (HCC) (Primary)  -     triamcinolone acetonide (KENALOG-40) injection 60 mg    2. Acute gastritis without hemorrhage, unspecified gastritis type  -     metroNIDAZOLE (Flagyl) 500 MG tablet; Take 1 tablet by mouth 3 (Three) Times a Day for 7 days.  Dispense: 21 tablet; Refill: 0  -     pantoprazole (Protonix) 40 MG EC tablet; Take 1 tablet by mouth Daily for 10 days.  Dispense: 10 tablet; Refill: 0  -     CBC w AUTO Differential; Future  -     Comprehensive metabolic panel; Future    I believe she likely has a viral gastritis that has caused her to now have a flare up of her lupus and there is a lupus rash on her lips. I will give her a steroid injection to help calm down the flare up and start her on flagyl and protonix for gastritis. She should follow up as scheduled with gastroenterology and rheumatologist.        Pt thought to be clinically stable at this time.    Follow Up   No follow-ups on file.  Patient was given instructions and counseling regarding her condition or for health maintenance advice. Please see specific information pulled into the AVS if appropriate.

## 2022-06-17 LAB
ALBUMIN SERPL-MCNC: 4 G/DL (ref 3.5–5.2)
ALBUMIN/GLOB SERPL: 1.1 G/DL
ALP SERPL-CCNC: 67 U/L (ref 39–117)
ALT SERPL W P-5'-P-CCNC: 13 U/L (ref 1–33)
ANION GAP SERPL CALCULATED.3IONS-SCNC: 14.2 MMOL/L (ref 5–15)
AST SERPL-CCNC: 19 U/L (ref 1–32)
BILIRUB SERPL-MCNC: 0.6 MG/DL (ref 0–1.2)
BUN SERPL-MCNC: 5 MG/DL (ref 8–23)
BUN/CREAT SERPL: 5.7 (ref 7–25)
CALCIUM SPEC-SCNC: 9.6 MG/DL (ref 8.6–10.5)
CHLORIDE SERPL-SCNC: 95 MMOL/L (ref 98–107)
CO2 SERPL-SCNC: 23.8 MMOL/L (ref 22–29)
CREAT SERPL-MCNC: 0.87 MG/DL (ref 0.57–1)
EGFRCR SERPLBLD CKD-EPI 2021: 75.9 ML/MIN/1.73
GLOBULIN UR ELPH-MCNC: 3.5 GM/DL
GLUCOSE SERPL-MCNC: 86 MG/DL (ref 65–99)
POTASSIUM SERPL-SCNC: 4.2 MMOL/L (ref 3.5–5.2)
PROT SERPL-MCNC: 7.5 G/DL (ref 6–8.5)
SODIUM SERPL-SCNC: 133 MMOL/L (ref 136–145)

## 2022-06-20 LAB — DSDNA AB SER QL CLIF: NEGATIVE

## 2022-06-24 DIAGNOSIS — K29.00 ACUTE GASTRITIS WITHOUT HEMORRHAGE, UNSPECIFIED GASTRITIS TYPE: ICD-10-CM

## 2022-06-24 RX ORDER — PANTOPRAZOLE SODIUM 40 MG/1
TABLET, DELAYED RELEASE ORAL
Qty: 10 TABLET | Refills: 0 | Status: SHIPPED | OUTPATIENT
Start: 2022-06-24 | End: 2022-06-30 | Stop reason: SINTOL

## 2022-06-30 ENCOUNTER — OFFICE VISIT (OUTPATIENT)
Dept: FAMILY MEDICINE CLINIC | Facility: CLINIC | Age: 62
End: 2022-06-30

## 2022-06-30 VITALS
HEART RATE: 82 BPM | HEIGHT: 67 IN | WEIGHT: 123.8 LBS | TEMPERATURE: 97.5 F | BODY MASS INDEX: 19.43 KG/M2 | DIASTOLIC BLOOD PRESSURE: 99 MMHG | SYSTOLIC BLOOD PRESSURE: 142 MMHG | RESPIRATION RATE: 18 BRPM | OXYGEN SATURATION: 100 %

## 2022-06-30 DIAGNOSIS — R13.19 ESOPHAGEAL DYSPHAGIA: ICD-10-CM

## 2022-06-30 DIAGNOSIS — L03.115 CELLULITIS OF RIGHT LOWER EXTREMITY: Primary | ICD-10-CM

## 2022-06-30 PROCEDURE — 99213 OFFICE O/P EST LOW 20 MIN: CPT | Performed by: FAMILY MEDICINE

## 2022-06-30 RX ORDER — CEPHALEXIN 500 MG/1
500 CAPSULE ORAL 2 TIMES DAILY
Qty: 20 CAPSULE | Refills: 0 | Status: SHIPPED | OUTPATIENT
Start: 2022-06-30 | End: 2022-08-03

## 2022-06-30 RX ORDER — MUPIROCIN CALCIUM 20 MG/G
1 CREAM TOPICAL 3 TIMES DAILY
Qty: 30 G | Refills: 0 | Status: SHIPPED | OUTPATIENT
Start: 2022-06-30 | End: 2022-08-03

## 2022-06-30 RX ORDER — ESOMEPRAZOLE MAGNESIUM 20 MG/1
20 FOR SUSPENSION ORAL
Qty: 10 EACH | Refills: 0 | COMMUNITY
Start: 2022-06-30 | End: 2022-08-03

## 2022-07-05 RX ORDER — PANTOPRAZOLE SODIUM 40 MG/1
TABLET, DELAYED RELEASE ORAL
Qty: 10 TABLET | Refills: 1 | Status: SHIPPED | OUTPATIENT
Start: 2022-07-05 | End: 2022-07-25

## 2022-07-07 RX ORDER — BUSPIRONE HYDROCHLORIDE 5 MG/1
TABLET ORAL
Qty: 90 TABLET | Refills: 3 | Status: SHIPPED | OUTPATIENT
Start: 2022-07-07 | End: 2023-02-20

## 2022-07-13 ENCOUNTER — APPOINTMENT (OUTPATIENT)
Dept: MAMMOGRAPHY | Facility: HOSPITAL | Age: 62
End: 2022-07-13

## 2022-07-14 ENCOUNTER — APPOINTMENT (OUTPATIENT)
Dept: MAMMOGRAPHY | Facility: HOSPITAL | Age: 62
End: 2022-07-14

## 2022-07-18 DIAGNOSIS — L03.115 CELLULITIS OF RIGHT LOWER EXTREMITY: ICD-10-CM

## 2022-07-18 RX ORDER — CEPHALEXIN 500 MG/1
CAPSULE ORAL
Qty: 20 CAPSULE | Refills: 0 | OUTPATIENT
Start: 2022-07-18

## 2022-07-25 ENCOUNTER — TELEPHONE (OUTPATIENT)
Dept: GASTROENTEROLOGY | Facility: CLINIC | Age: 62
End: 2022-07-25

## 2022-07-25 RX ORDER — PANTOPRAZOLE SODIUM 40 MG/1
TABLET, DELAYED RELEASE ORAL
Qty: 10 TABLET | Refills: 1 | Status: SHIPPED | OUTPATIENT
Start: 2022-07-25 | End: 2022-08-17 | Stop reason: SDUPTHER

## 2022-08-03 ENCOUNTER — LAB (OUTPATIENT)
Dept: LAB | Facility: HOSPITAL | Age: 62
End: 2022-08-03

## 2022-08-03 DIAGNOSIS — R93.3 ABNORMAL FINDING ON GI TRACT IMAGING: ICD-10-CM

## 2022-08-03 LAB — SARS-COV-2 RNA PNL SPEC NAA+PROBE: NOT DETECTED

## 2022-08-03 PROCEDURE — U0004 COV-19 TEST NON-CDC HGH THRU: HCPCS

## 2022-08-08 ENCOUNTER — ANESTHESIA EVENT (OUTPATIENT)
Dept: GASTROENTEROLOGY | Facility: HOSPITAL | Age: 62
End: 2022-08-08

## 2022-08-08 ENCOUNTER — HOSPITAL ENCOUNTER (OUTPATIENT)
Facility: HOSPITAL | Age: 62
Setting detail: HOSPITAL OUTPATIENT SURGERY
Discharge: HOME | End: 2022-08-08
Attending: INTERNAL MEDICINE | Admitting: INTERNAL MEDICINE

## 2022-08-08 ENCOUNTER — ANESTHESIA (OUTPATIENT)
Dept: GASTROENTEROLOGY | Facility: HOSPITAL | Age: 62
End: 2022-08-08

## 2022-08-08 VITALS
OXYGEN SATURATION: 99 % | HEART RATE: 81 BPM | TEMPERATURE: 97.6 F | DIASTOLIC BLOOD PRESSURE: 98 MMHG | SYSTOLIC BLOOD PRESSURE: 149 MMHG | BODY MASS INDEX: 19.84 KG/M2 | RESPIRATION RATE: 18 BRPM | HEIGHT: 66 IN | WEIGHT: 123.46 LBS

## 2022-08-08 DIAGNOSIS — R93.3 ABNORMAL FINDING ON GI TRACT IMAGING: ICD-10-CM

## 2022-08-08 PROCEDURE — 43239 EGD BIOPSY SINGLE/MULTIPLE: CPT | Performed by: INTERNAL MEDICINE

## 2022-08-08 PROCEDURE — 88342 IMHCHEM/IMCYTCHM 1ST ANTB: CPT | Performed by: INTERNAL MEDICINE

## 2022-08-08 PROCEDURE — 25010000002 PROPOFOL 10 MG/ML EMULSION: Performed by: NURSE ANESTHETIST, CERTIFIED REGISTERED

## 2022-08-08 PROCEDURE — 88305 TISSUE EXAM BY PATHOLOGIST: CPT | Performed by: INTERNAL MEDICINE

## 2022-08-08 RX ORDER — PROPOFOL 10 MG/ML
VIAL (ML) INTRAVENOUS AS NEEDED
Status: DISCONTINUED | OUTPATIENT
Start: 2022-08-08 | End: 2022-08-08 | Stop reason: SURG

## 2022-08-08 RX ORDER — SODIUM CHLORIDE, SODIUM LACTATE, POTASSIUM CHLORIDE, CALCIUM CHLORIDE 600; 310; 30; 20 MG/100ML; MG/100ML; MG/100ML; MG/100ML
30 INJECTION, SOLUTION INTRAVENOUS CONTINUOUS
Status: DISCONTINUED | OUTPATIENT
Start: 2022-08-08 | End: 2022-08-08 | Stop reason: HOSPADM

## 2022-08-08 RX ORDER — LIDOCAINE HYDROCHLORIDE 20 MG/ML
INJECTION, SOLUTION EPIDURAL; INFILTRATION; INTRACAUDAL; PERINEURAL AS NEEDED
Status: DISCONTINUED | OUTPATIENT
Start: 2022-08-08 | End: 2022-08-08 | Stop reason: SURG

## 2022-08-08 RX ADMIN — PROPOFOL 100 MG: 10 INJECTION, EMULSION INTRAVENOUS at 12:14

## 2022-08-08 RX ADMIN — LIDOCAINE HYDROCHLORIDE 100 MG: 20 INJECTION, SOLUTION EPIDURAL; INFILTRATION; INTRACAUDAL; PERINEURAL at 12:09

## 2022-08-08 RX ADMIN — SODIUM CHLORIDE, POTASSIUM CHLORIDE, SODIUM LACTATE AND CALCIUM CHLORIDE 30 ML/HR: 600; 310; 30; 20 INJECTION, SOLUTION INTRAVENOUS at 11:45

## 2022-08-08 RX ADMIN — PROPOFOL 100 MG: 10 INJECTION, EMULSION INTRAVENOUS at 12:09

## 2022-08-08 RX ADMIN — PROPOFOL 250 MCG/KG/MIN: 10 INJECTION, EMULSION INTRAVENOUS at 12:09

## 2022-08-08 NOTE — ANESTHESIA POSTPROCEDURE EVALUATION
Patient: Danielle Manuel    Procedure Summary     Date: 08/08/22 Room / Location: [facility] ENDOSCOPY 4 / [facility] ENDOSCOPY    Anesthesia Start: 1207 Anesthesia Stop: 1217    Procedure: ESOPHAGOGASTRODUODENOSCOPY (N/A ) Diagnosis:       Abnormal finding on GI tract imaging      (Abnormal finding on GI tract imaging [R93.3])    Surgeons: Harmony Thacker MD Provider: Augustine Hillman MD    Anesthesia Type: general ASA Status: 2          Anesthesia Type: general    Vitals  Vitals Value Taken Time   /98 08/08/22 1234   Temp 36.4 °C (97.6 °F) 08/08/22 1234   Pulse 81 08/08/22 1234   Resp 18 08/08/22 1234   SpO2 99 % 08/08/22 1234           Post Anesthesia Care and Evaluation    Patient location during evaluation: bedside  Patient participation: complete - patient participated  Level of consciousness: awake  Pain score: 0  Pain management: adequate    Airway patency: patent  Anesthetic complications: No anesthetic complications  PONV Status: none  Cardiovascular status: acceptable and stable  Respiratory status: acceptable and room air  Hydration status: acceptable    Comments: An Anesthesiologist personally participated in the most demanding procedures (including induction and emergence if applicable) in the anesthesia plan, monitored the course of anesthesia administration at frequent intervals and remained physically present and available for immediate diagnosis and treatment of emergencies.

## 2022-08-08 NOTE — ANESTHESIA PREPROCEDURE EVALUATION
Anesthesia Evaluation     Patient summary reviewed and Nursing notes reviewed   no history of anesthetic complications:  NPO Solid Status: > 8 hours  NPO Liquid Status: > 2 hours           Airway   Mallampati: III  TM distance: >3 FB  Neck ROM: full  No difficulty expected  Dental      Pulmonary - normal exam    breath sounds clear to auscultation  (+) COPD,   Cardiovascular - normal exam  Exercise tolerance: good (4-7 METS)    Rhythm: regular  Rate: normal    (+) hyperlipidemia,       Neuro/Psych  (+) headaches,    GI/Hepatic/Renal/Endo    (+)  GERD,      Musculoskeletal     Abdominal    Substance History - negative use     OB/GYN negative ob/gyn ROS         Other   arthritis,    history of cancer    ROS/Med Hx Other: Pt has lupus, takes several different meds    Has full head rash from lupus/local histamine release                  Anesthesia Plan    ASA 2     general     (Total IV Anesthesia    Patient understands anesthesia not responsible for dental damage.  )  intravenous induction     Anesthetic plan, risks, benefits, and alternatives have been provided, discussed and informed consent has been obtained with: patient.    Plan discussed with CRNA.        CODE STATUS:

## 2022-08-08 NOTE — H&P
Pre Procedure History & Physical    Chief Complaint:   Abnormal CT    Subjective     HPI:   63 yo F here for eval of esophageal thickening noted on CT.    Past Medical History:   Past Medical History:   Diagnosis Date   • Acid reflux    • Anxiety disorder 05/05/2021   • Arthritis 09/01/2021   • Bunion 1990   • Cancer ([facility]) 01/01/2018    skin   • Chronic pain 05/05/2021   • Claustrophobia 01/01/1990   • COPD (chronic obstructive pulmonary disease) ([facility]) 10/01/2021   • CTS (carpal tunnel syndrome) 01/01/1990   • Depression 01/2011   • Difficulty walking 2022   • Headache 09/072021   • Hyperlipemia    • Hyperlipidemia 05/05/2021   • Ingrown toenail 1977   • Insomnia, unspecified 05/05/2021   • Low back pain 02/01/2021   • Lupus ([facility])    • MRSA (methicillin resistant Staphylococcus aureus) 06/01/2021   • Osteopenia 01/01/2010   • Post menopausal syndrome 05/05/2021   • Thoracic disc disorder 02/01/2021   • Vitamin D deficiency 05/05/2021       Past Surgical History:  Past Surgical History:   Procedure Laterality Date   • APPENDECTOMY     • CARPAL TUNNEL RELEASE  03/01/1990   • HYSTERECTOMY     • TONSILECTOMY, ADENOIDECTOMY, BILATERAL MYRINGOTOMY AND TUBES     • TONSILLECTOMY     • TUBAL ABDOMINAL LIGATION         Family History:  Family History   Problem Relation Age of Onset   • Arthritis Mother    • Anxiety disorder Mother    • Hearing loss Mother    • Hyperlipidemia Mother    • Heart disease Father    • Alcohol abuse Father    • Early death Father    • Pancreatic cancer Other    • Diabetes Other         MELLITUS   • Cancer Maternal Grandfather    • Early death Maternal Grandfather    • Cancer Maternal Grandmother    • Cancer Maternal Uncle    • COPD Paternal Uncle    • Heart disease Paternal Uncle    • Depression Son    • Diabetes Brother    • Hyperlipidemia Brother    • Hyperlipidemia Paternal Uncle        Social History:   reports that she has been smoking cigarettes. She started smoking about 36 years ago. She has  a 20.00 pack-year smoking history. She has never used smokeless tobacco. She reports current alcohol use of about 2.0 standard drinks of alcohol per week. She reports that she does not use drugs.    Medications:   Medications Prior to Admission   Medication Sig Dispense Refill Last Dose   • alendronate (FOSAMAX) 70 MG tablet Take 1 tablet by mouth Every 7 (Seven) Days. 12 tablet 3 Past Week at Unknown time   • busPIRone (BUSPAR) 5 MG tablet TAKE ONE TABLET BY MOUTH THREE TIMES A DAY 90 tablet 3 Past Week at Unknown time   • calcium carbonate (OS-PATTIE) 1250 (500 Ca) MG tablet    Past Week at Unknown time   • clonazePAM (KlonoPIN) 1 MG tablet Take 1 tablet by mouth At Night As Needed for Anxiety. 30 tablet 2 8/7/2022 at Unknown time   • gabapentin (NEURONTIN) 300 MG capsule Take 1 capsule by mouth 3 (Three) Times a Day. 90 capsule 1 8/7/2022 at Unknown time   • hydroxychloroquine (PLAQUENIL) 200 MG tablet    8/7/2022 at Unknown time   • mycophenolate (CELLCEPT) 500 MG tablet Take 500 mg by mouth 4 (Four) Times a Day.   8/7/2022 at Unknown time   • Omega-3 Fatty Acids (fish oil) 1000 MG capsule capsule    Past Week at Unknown time   • ondansetron ODT (ZOFRAN-ODT) 4 MG disintegrating tablet Every 12 (Twelve) Hours.   Past Week at Unknown time   • pantoprazole (PROTONIX) 40 MG EC tablet TAKE ONE TABLET BY MOUTH DAILY FOR 10 DAYS 10 tablet 1 8/7/2022 at Unknown time   • silver sulfadiazine (Silvadene) 1 % cream Apply 1 application topically to the appropriate area as directed 2 (Two) Times a Day. 50 g 0 Past Week at Unknown time   • tiotropium (Spiriva HandiHaler) 18 MCG per inhalation capsule Place 1 capsule into inhaler and inhale Daily. 28 capsule 11 Past Week at Unknown time   • traMADol (ULTRAM) 50 MG tablet Take 1 tablet by mouth Daily As Needed for Moderate Pain . 30 tablet 2 8/7/2022 at Unknown time   • vitamin D (ERGOCALCIFEROL) 1.25 MG (05799 UT) capsule capsule    Past Week at Unknown time  "      Allergies:  Penicillins and Cyclobenzaprine    ROS:    Pertinent items are noted in HPI     Objective     Blood pressure (!) 143/102, pulse 63, temperature 98.7 °F (37.1 °C), temperature source Temporal, resp. rate 18, height 167.6 cm (66\"), weight 56 kg (123 lb 7.3 oz), SpO2 100 %, not currently breastfeeding.    Physical Exam   Constitutional: Pt is oriented to person, place, and time and well-developed, well-nourished, and in no distress.   Mouth/Throat: Oropharynx is clear and moist.   Neck: Normal range of motion.   Cardiovascular: Normal rate, regular rhythm and normal heart sounds.    Pulmonary/Chest: Effort normal and breath sounds normal.   Abdominal: Soft. Nontender  Skin: Skin is warm and dry.   Psychiatric: Mood, memory, affect and judgment normal.     Assessment & Plan     Diagnosis:  Abnormal CT    Anticipated Surgical Procedure:  EGD    The risks, benefits, and alternatives of this procedure have been discussed with the patient or the responsible party- the patient understands and agrees to proceed.          "

## 2022-08-09 LAB
CYTO UR: NORMAL
LAB AP CASE REPORT: NORMAL
LAB AP CLINICAL INFORMATION: NORMAL
LAB AP SPECIAL STAINS: NORMAL
PATH REPORT.FINAL DX SPEC: NORMAL
PATH REPORT.GROSS SPEC: NORMAL

## 2022-08-10 ENCOUNTER — TELEPHONE (OUTPATIENT)
Dept: GASTROENTEROLOGY | Facility: CLINIC | Age: 62
End: 2022-08-10

## 2022-08-10 NOTE — TELEPHONE ENCOUNTER
Spoke to pt and informed of Ian RUSSELL result note and recommendations. Pt verified understanding.

## 2022-08-10 NOTE — TELEPHONE ENCOUNTER
----- Message from DREW Gamez sent at 8/10/2022 10:48 AM EDT -----  Stomach biopsy consistent with active gastritis.  Esophageal biopsies consistent with reflux esophagitis.  Continue PPI and avoid NSAIDs.  Patient did not have any symptoms in office so she may follow-up as needed.

## 2022-08-16 ENCOUNTER — TRANSCRIBE ORDERS (OUTPATIENT)
Dept: ADMINISTRATIVE | Facility: HOSPITAL | Age: 62
End: 2022-08-16

## 2022-08-16 DIAGNOSIS — M50.30 DISC DISEASE, DEGENERATIVE, CERVICAL: Primary | ICD-10-CM

## 2022-08-17 ENCOUNTER — OFFICE VISIT (OUTPATIENT)
Dept: FAMILY MEDICINE CLINIC | Facility: CLINIC | Age: 62
End: 2022-08-17

## 2022-08-17 ENCOUNTER — LAB (OUTPATIENT)
Dept: LAB | Facility: HOSPITAL | Age: 62
End: 2022-08-17

## 2022-08-17 VITALS
OXYGEN SATURATION: 100 % | RESPIRATION RATE: 18 BRPM | HEIGHT: 66 IN | SYSTOLIC BLOOD PRESSURE: 150 MMHG | WEIGHT: 125 LBS | DIASTOLIC BLOOD PRESSURE: 99 MMHG | TEMPERATURE: 98.6 F | BODY MASS INDEX: 20.09 KG/M2 | HEART RATE: 78 BPM

## 2022-08-17 DIAGNOSIS — E55.9 VITAMIN D DEFICIENCY: Chronic | ICD-10-CM

## 2022-08-17 DIAGNOSIS — R00.2 PALPITATIONS: Primary | ICD-10-CM

## 2022-08-17 DIAGNOSIS — F41.9 ANXIETY DISORDER, UNSPECIFIED TYPE: Chronic | ICD-10-CM

## 2022-08-17 DIAGNOSIS — Z79.899 IMMUNOSUPPRESSION DUE TO DRUG THERAPY: Chronic | ICD-10-CM

## 2022-08-17 DIAGNOSIS — D84.821 IMMUNOSUPPRESSION DUE TO DRUG THERAPY: Chronic | ICD-10-CM

## 2022-08-17 DIAGNOSIS — F51.01 PRIMARY INSOMNIA: Chronic | ICD-10-CM

## 2022-08-17 DIAGNOSIS — M32.9 LUPUS: Chronic | ICD-10-CM

## 2022-08-17 DIAGNOSIS — E78.2 MIXED HYPERLIPIDEMIA: Chronic | ICD-10-CM

## 2022-08-17 PROBLEM — M50.30 DDD (DEGENERATIVE DISC DISEASE), CERVICAL: Status: ACTIVE | Noted: 2022-07-18

## 2022-08-17 LAB
ALBUMIN SERPL-MCNC: 4.3 G/DL (ref 3.5–5.2)
ALBUMIN/GLOB SERPL: 1.7 G/DL
ALP SERPL-CCNC: 64 U/L (ref 39–117)
ALT SERPL W P-5'-P-CCNC: 11 U/L (ref 1–33)
ANION GAP SERPL CALCULATED.3IONS-SCNC: 11.5 MMOL/L (ref 5–15)
AST SERPL-CCNC: 19 U/L (ref 1–32)
BILIRUB SERPL-MCNC: 0.7 MG/DL (ref 0–1.2)
BUN SERPL-MCNC: 10 MG/DL (ref 8–23)
BUN/CREAT SERPL: 12.7 (ref 7–25)
CALCIUM SPEC-SCNC: 9.2 MG/DL (ref 8.6–10.5)
CHLORIDE SERPL-SCNC: 96 MMOL/L (ref 98–107)
CO2 SERPL-SCNC: 28.5 MMOL/L (ref 22–29)
CREAT SERPL-MCNC: 0.79 MG/DL (ref 0.57–1)
DEPRECATED RDW RBC AUTO: 41.4 FL (ref 37–54)
EGFRCR SERPLBLD CKD-EPI 2021: 85.2 ML/MIN/1.73
ERYTHROCYTE [DISTWIDTH] IN BLOOD BY AUTOMATED COUNT: 13 % (ref 12.3–15.4)
GLOBULIN UR ELPH-MCNC: 2.5 GM/DL
GLUCOSE SERPL-MCNC: 78 MG/DL (ref 65–99)
HCT VFR BLD AUTO: 41.9 % (ref 34–46.6)
HGB BLD-MCNC: 14.2 G/DL (ref 12–15.9)
MCH RBC QN AUTO: 30.1 PG (ref 26.6–33)
MCHC RBC AUTO-ENTMCNC: 33.9 G/DL (ref 31.5–35.7)
MCV RBC AUTO: 88.8 FL (ref 79–97)
PLATELET # BLD AUTO: 236 10*3/MM3 (ref 140–450)
PMV BLD AUTO: 11.4 FL (ref 6–12)
POTASSIUM SERPL-SCNC: 4.2 MMOL/L (ref 3.5–5.2)
PROT SERPL-MCNC: 6.8 G/DL (ref 6–8.5)
RBC # BLD AUTO: 4.72 10*6/MM3 (ref 3.77–5.28)
SODIUM SERPL-SCNC: 136 MMOL/L (ref 136–145)
T4 FREE SERPL-MCNC: 1.55 NG/DL (ref 0.93–1.7)
TSH SERPL DL<=0.05 MIU/L-ACNC: 0.82 UIU/ML (ref 0.27–4.2)
WBC NRBC COR # BLD: 4.52 10*3/MM3 (ref 3.4–10.8)

## 2022-08-17 PROCEDURE — 84439 ASSAY OF FREE THYROXINE: CPT

## 2022-08-17 PROCEDURE — 85027 COMPLETE CBC AUTOMATED: CPT

## 2022-08-17 PROCEDURE — 86376 MICROSOMAL ANTIBODY EACH: CPT | Performed by: FAMILY MEDICINE

## 2022-08-17 PROCEDURE — 99214 OFFICE O/P EST MOD 30 MIN: CPT | Performed by: FAMILY MEDICINE

## 2022-08-17 PROCEDURE — 80053 COMPREHEN METABOLIC PANEL: CPT

## 2022-08-17 PROCEDURE — 36415 COLL VENOUS BLD VENIPUNCTURE: CPT | Performed by: FAMILY MEDICINE

## 2022-08-17 PROCEDURE — 84443 ASSAY THYROID STIM HORMONE: CPT

## 2022-08-17 RX ORDER — PANTOPRAZOLE SODIUM 40 MG/1
40 TABLET, DELAYED RELEASE ORAL DAILY
Qty: 90 TABLET | Refills: 3 | Status: SHIPPED | OUTPATIENT
Start: 2022-08-17

## 2022-08-17 RX ORDER — TRAMADOL HYDROCHLORIDE 50 MG/1
TABLET ORAL EVERY 12 HOURS SCHEDULED
COMMUNITY
End: 2022-08-17

## 2022-08-17 RX ORDER — PROPRANOLOL HYDROCHLORIDE 80 MG/1
80 CAPSULE, EXTENDED RELEASE ORAL DAILY
Qty: 30 CAPSULE | Refills: 0 | Status: SHIPPED | OUTPATIENT
Start: 2022-08-17 | End: 2022-09-15

## 2022-08-17 RX ORDER — CLONAZEPAM 1 MG/1
1 TABLET ORAL NIGHTLY PRN
Qty: 30 TABLET | Refills: 5 | Status: SHIPPED | OUTPATIENT
Start: 2022-08-17 | End: 2023-02-21 | Stop reason: SDUPTHER

## 2022-08-17 RX ORDER — CHLORHEXIDINE GLUCONATE 0.12 MG/ML
15 RINSE ORAL 2 TIMES DAILY PRN
COMMUNITY
Start: 2022-07-28

## 2022-08-17 RX ORDER — METRONIDAZOLE 500 MG/1
TABLET ORAL
COMMUNITY
End: 2022-11-10

## 2022-08-17 NOTE — PROGRESS NOTES
"Chief Complaint  Ear Fullness, Hypertension, and Anxiety (Shaking, heart racing, breaking out in a sweat)    Subjective        Danielle Manuel presents to [facility]  Danielle Manuel is a 62-year-old female who presents to the clinic to follow-up in her blood pressure. Her blood pressure was 150/99 mmHg and it was 149/98 mmHg. Her blood pressure is typically around 140/90 mmHg. Her weight is stable at 125 pounds with a BMI of 20. She is on several medications. She takes Klonopin for chronic anxiety, Plaquenil for lupus and arthritis, and Cellcept additionally. She also takes Protonix for heartburn, tramadol for pain, and Fosamax for osteoporosis. AYLA was reviewed, and the contract is on file for the patient.     The patient is concerned about her blood pressure due to a family history of hypertension and massive heart attacks. She states that she is experiencing sweating, shaking, and feels that her heart is racing while resting. She notes that these symptoms have progressed from occasionally to daily. She states that she is unsure if she has ever experienced a panic attack in the past. The patient reports that she is taking buspirone three times daily. She notes that she experienced the loss of her brother approximately 2 months ago.       Objective   Vital Signs:  /99 (BP Location: Right arm)   Pulse 78   Temp 98.6 °F (37 °C) (Infrared)   Resp 18   Ht 167.6 cm (66\")   Wt 56.7 kg (125 lb)   SpO2 100%   BMI 20.18 kg/m²   Estimated body mass index is 20.18 kg/m² as calculated from the following:    Height as of this encounter: 167.6 cm (66\").    Weight as of this encounter: 56.7 kg (125 lb).    BMI is within normal parameters. No other follow-up for BMI required.      Physical Exam  Vitals reviewed.   Constitutional:       Appearance: Normal appearance. She is well-developed.   HENT:      Head: Normocephalic and atraumatic.      Right Ear: External ear normal.      " Left Ear: External ear normal.      Mouth/Throat:      Pharynx: No oropharyngeal exudate.   Eyes:      Conjunctiva/sclera: Conjunctivae normal.      Pupils: Pupils are equal, round, and reactive to light.   Cardiovascular:      Rate and Rhythm: Normal rate.   Pulmonary:      Effort: Pulmonary effort is normal.   Abdominal:      General: Abdomen is flat. There is no distension.      Palpations: Abdomen is soft.   Skin:     General: Skin is warm and dry.   Neurological:      General: No focal deficit present.      Mental Status: She is alert and oriented to person, place, and time.   Psychiatric:         Mood and Affect: Mood and affect normal.         Behavior: Behavior normal.         Thought Content: Thought content normal.         Judgment: Judgment normal.        Result Review :  The following data was reviewed by: Teofilo Ordonez DO on 08/17/2022:  Common labs    Common Labsle 3/22/22 6/16/22 6/16/22 6/16/22 8/17/22 8/17/22     1424 1424 1424 1007 1007   Glucose    86  78   BUN    5 (A)  10   Creatinine 0.50   0.87  0.79   Sodium    133 (A)  136   Potassium    4.2  4.2   Chloride    95 (A)  96 (A)   Calcium    9.6  9.2   Albumin    4.00  4.30   Total Bilirubin    0.6  0.7   Alkaline Phosphatase    67  64   AST (SGOT)    19  19   ALT (SGPT)    13  11   WBC  5.10   4.52    Hemoglobin  14.2   14.2    Hematocrit  41.9   41.9    Platelets  216   236    Total Cholesterol   226 (A)      Triglycerides   66      HDL Cholesterol   76 (A)      LDL Cholesterol    139 (A)      (A) Abnormal value       Comments are available for some flowsheets but are not being displayed.           Data reviewed: Radiologic studies XR T spine 2/2022 Stable appearance of T9 fracture with mild focal kyphosis.          Assessment and Plan   Diagnoses and all orders for this visit:    1. Palpitations (Primary)  -     propranolol LA (Inderal LA) 80 MG 24 hr capsule; Take 1 capsule by mouth Daily.  Dispense: 30 capsule; Refill: 0    2. Anxiety  disorder, unspecified type  Comments:  Stable; UDS and AYLA reviewed and appropriate. Refill Klonopin 1mg daily prn #30/0. Refill Celexa . Follow up in 3mths.  Orders:  -     clonazePAM (KlonoPIN) 1 MG tablet; Take 1 tablet by mouth At Night As Needed for Anxiety.  Dispense: 30 tablet; Refill: 5  -     CBC (No Diff); Future  -     Comprehensive metabolic panel; Future  -     TSH+Free T4; Future  -     Thyroid Peroxidase Antibody    3. Mixed hyperlipidemia    4. Vitamin D deficiency    5. Lupus (HCC)    6. Immunosuppression due to drug therapy (HCC)    7. Primary insomnia    Other orders  -     pantoprazole (PROTONIX) 40 MG EC tablet; Take 1 tablet by mouth Daily. for 10 days.  Dispense: 90 tablet; Refill: 3      Patient presents with palpitations, increased anxiety or panic attack type episodes.     We will try propranolol daily given that she does have some headaches, high blood pressure, and history of heart disease in her family.     We will consider EKG and follow-up in approximately 2 weeks with further work-up and Holter monitor if indicated based on results of trying this medication.    The patient will complete laboratory studies today and we will check CBC, CMP, and TSH to ensure that the autoimmune disease is affecting thyroid levels or prompting these episodes.     The patient is advised to continue medications as prescribed for lupus and chronic pain arthritis.     We refilled Klonopin for anxiety control. We may consider increasing or adjusting BuSpar. She will return to the clinic in approximately 2 weeks.   AYLA reviewed, contract on file, low risk diversion, appropriate       Follow Up   Return in about 2 weeks (around 8/31/2022), or if symptoms worsen or fail to improve, for Recheck.  Patient was given instructions and counseling regarding her condition or for health maintenance advice. Please see specific information pulled into the AVS if appropriate.       Answers for HPI/ROS submitted by the  patient on 8/15/2022  Please describe your symptoms.: concerns with blood pressure, anxiety, ear pain  Have you had these symptoms before?: Yes  How long have you been having these symptoms?: Greater than 2 weeks  Please list any medications you are currently taking for this condition.: buspirone for anxiety  Please describe any probable cause for these symptoms. : unsure  What is the primary reason for your visit?: Other    Transcribed from ambient dictation for Teofilo Ordonez DO by Michelle Fowler.  08/17/22   13:25 EDT    Patient verbalized consent to the visit recording.

## 2022-08-18 LAB — THYROPEROXIDASE AB SERPL-ACNC: 9 IU/ML (ref 0–34)

## 2022-09-07 ENCOUNTER — HOSPITAL ENCOUNTER (OUTPATIENT)
Dept: MRI IMAGING | Facility: HOSPITAL | Age: 62
Discharge: HOME | End: 2022-09-07
Admitting: PHYSICIAN ASSISTANT

## 2022-09-07 ENCOUNTER — APPOINTMENT (OUTPATIENT)
Dept: MRI IMAGING | Facility: HOSPITAL | Age: 62
End: 2022-09-07

## 2022-09-07 DIAGNOSIS — M50.30 DISC DISEASE, DEGENERATIVE, CERVICAL: ICD-10-CM

## 2022-09-07 PROCEDURE — 72141 MRI NECK SPINE W/O DYE: CPT

## 2022-09-15 DIAGNOSIS — R00.2 PALPITATIONS: ICD-10-CM

## 2022-09-15 RX ORDER — PROPRANOLOL HYDROCHLORIDE 80 MG/1
CAPSULE, EXTENDED RELEASE ORAL
Qty: 30 CAPSULE | Refills: 0 | Status: SHIPPED | OUTPATIENT
Start: 2022-09-15 | End: 2022-10-17

## 2022-10-15 DIAGNOSIS — R00.2 PALPITATIONS: ICD-10-CM

## 2022-10-17 RX ORDER — PROPRANOLOL HYDROCHLORIDE 80 MG/1
CAPSULE, EXTENDED RELEASE ORAL
Qty: 30 CAPSULE | Refills: 0 | Status: SHIPPED | OUTPATIENT
Start: 2022-10-17 | End: 2022-11-14

## 2022-11-10 ENCOUNTER — HOSPITAL ENCOUNTER (OUTPATIENT)
Dept: GENERAL RADIOLOGY | Facility: HOSPITAL | Age: 62
Discharge: HOME | End: 2022-11-10
Admitting: FAMILY MEDICINE

## 2022-11-10 ENCOUNTER — OFFICE VISIT (OUTPATIENT)
Dept: FAMILY MEDICINE CLINIC | Facility: CLINIC | Age: 62
End: 2022-11-10

## 2022-11-10 VITALS
WEIGHT: 130.3 LBS | OXYGEN SATURATION: 100 % | HEIGHT: 66 IN | HEART RATE: 67 BPM | SYSTOLIC BLOOD PRESSURE: 127 MMHG | TEMPERATURE: 98.7 F | DIASTOLIC BLOOD PRESSURE: 94 MMHG | BODY MASS INDEX: 20.94 KG/M2

## 2022-11-10 DIAGNOSIS — M25.551 BILATERAL HIP PAIN: Primary | ICD-10-CM

## 2022-11-10 DIAGNOSIS — F51.01 PRIMARY INSOMNIA: ICD-10-CM

## 2022-11-10 DIAGNOSIS — M25.552 BILATERAL HIP PAIN: ICD-10-CM

## 2022-11-10 DIAGNOSIS — M25.551 BILATERAL HIP PAIN: ICD-10-CM

## 2022-11-10 DIAGNOSIS — M32.9 LUPUS: ICD-10-CM

## 2022-11-10 DIAGNOSIS — M25.552 BILATERAL HIP PAIN: Primary | ICD-10-CM

## 2022-11-10 DIAGNOSIS — G89.4 CHRONIC PAIN SYNDROME: ICD-10-CM

## 2022-11-10 DIAGNOSIS — E55.9 VITAMIN D DEFICIENCY: ICD-10-CM

## 2022-11-10 PROCEDURE — 99213 OFFICE O/P EST LOW 20 MIN: CPT | Performed by: FAMILY MEDICINE

## 2022-11-10 PROCEDURE — 73521 X-RAY EXAM HIPS BI 2 VIEWS: CPT

## 2022-11-10 RX ORDER — DESONIDE 0.5 MG/G
1 OINTMENT TOPICAL 3 TIMES DAILY PRN
COMMUNITY
Start: 2022-09-15

## 2022-11-10 NOTE — PROGRESS NOTES
"Chief Complaint  Hip Pain    Subjective        Danielle Manuel presents to [facility]  History of Present Illness  Danielle Manuel is a 62-year-old female who presents to the clinic with complaints of bilateral hip pain. Chronic conditions include lupus, current pain disorder, cervical spondylosis, idiopathic neuropathy, emphysema, hyperlipidemia, anxiety, insomnia, and a significant amount of skin tract dermatological complications or issues with lupus. She is on several medications, including tramadol and gabapentin for pain, propranolol for migraines, alendronate for osteoporosis, Plaquenil, and Klonopin as needed. She is also prescribed mycophenolate long term, and is managing and monitoring these conditions with other specialists. AYLA was reviewed and contract is on file. The patient's UDS is up-to-date. Counseled on medications, risks, and benefits. There is no hip x-ray in the chart. She did complete a DEXA scan in 2021, which revealed osteoporosis is she is on mediation appropriately. She denies any falls or other. She completed laboratory studies on 08/20/2022. Her TSH, T4, CMP, CBC, and several thyroid laboratory studies for monitoring rheumatological lupus condition have been stable.    The patient reports experiencing increased bilateral hip pain with movement. She describes the pain as mild and intermittent. She states the discomfort began as \"dull\" pain approximately 2 weeks ago. She reports tramadol does provide mild symptom relief. She notes she has tried Biofreeze and lidocaine patches, which have provided temporary relief for approximately 15 minutes. She denies any falls or injuries. She notes she received an epidural injection in her upper back and lower neck last week; however, states it was not helpful.      Objective   Vital Signs:  /94 (BP Location: Right arm, Patient Position: Sitting)   Pulse 67   Temp 98.7 °F (37.1 °C)   Ht 167.6 cm (65.98\")  " " Wt 59.1 kg (130 lb 4.8 oz)   SpO2 100%   BMI 21.04 kg/m²   Estimated body mass index is 21.04 kg/m² as calculated from the following:    Height as of this encounter: 167.6 cm (65.98\").    Weight as of this encounter: 59.1 kg (130 lb 4.8 oz).    BMI is within normal parameters. No other follow-up for BMI required.      Physical Exam  Vitals reviewed.   Constitutional:       Appearance: Normal appearance. She is well-developed.   HENT:      Head: Normocephalic and atraumatic.      Right Ear: External ear normal.      Left Ear: External ear normal.      Mouth/Throat:      Pharynx: No oropharyngeal exudate.   Eyes:      Conjunctiva/sclera: Conjunctivae normal.      Pupils: Pupils are equal, round, and reactive to light.   Cardiovascular:      Rate and Rhythm: Normal rate.   Pulmonary:      Effort: Pulmonary effort is normal.   Abdominal:      General: Abdomen is flat. There is no distension.      Palpations: Abdomen is soft.   Musculoskeletal:      Right lower leg: Tenderness present.      Left lower leg: Tenderness present.   Skin:     General: Skin is warm and dry.   Neurological:      General: No focal deficit present.      Mental Status: She is alert and oriented to person, place, and time.   Psychiatric:         Mood and Affect: Mood and affect normal.         Behavior: Behavior normal.         Thought Content: Thought content normal.         Judgment: Judgment normal.        Result Review :  The following data was reviewed by: Teofilo Ordonez DO on 11/10/2022:  Common labs    Common Labs 3/22/22 6/16/22 6/16/22 6/16/22 8/17/22 8/17/22     1424 1424 1424 1007 1007   Glucose    86  78   BUN    5 (A)  10   Creatinine 0.50   0.87  0.79   Sodium    133 (A)  136   Potassium    4.2  4.2   Chloride    95 (A)  96 (A)   Calcium    9.6  9.2   Albumin    4.00  4.30   Total Bilirubin    0.6  0.7   Alkaline Phosphatase    67  64   AST (SGOT)    19  19   ALT (SGPT)    13  11   WBC  5.10   4.52    Hemoglobin  14.2   14.2  "   Hematocrit  41.9   41.9    Platelets  216   236    Total Cholesterol   226 (A)      Triglycerides   66      HDL Cholesterol   76 (A)      LDL Cholesterol    139 (A)      (A) Abnormal value       Comments are available for some flowsheets but are not being displayed.           Data reviewed: Radiologic studies XR today reviewing hips, chronic hip pain likely related to arthritisl, SI joint pain, exercises and therapy rec provided, handouts          Assessment and Plan   Diagnoses and all orders for this visit:    1. Bilateral hip pain (Primary)  -     XR Hips Bilateral With or Without Pelvis 2 View; Future  -     Ambulatory Referral to Physical Therapy Evaluate and treat    2. Lupus (HCC)    3. Vitamin D deficiency    4. Primary insomnia    5. Chronic pain syndrome  -     Ambulatory Referral to Physical Therapy Evaluate and treat       XR today reviewing hips, chronic hip pain likely related to arthritisl, SI joint pain, exercises and therapy rec provided, handouts    Advised follow up with pain management for possible injection, therapy, or further imaging if needed. Based on examination, localized dull ache, arthritis, SI joint pain, recommended physical therapy to evaluate and treat.     Otherwise, advised to continue pain medication as prescribed, topical lidocaine, and reduced activity such as avoiding activities that exacerbate pain as well as position as able.     Advised to continue medications for other chronic conditions. The patient will follow up routinely.          Follow Up   No follow-ups on file.  Patient was given instructions and counseling regarding her condition or for health maintenance advice. Please see specific information pulled into the AVS if appropriate.       Answers for HPI/ROS submitted by the patient on 11/8/2022  Please describe your symptoms.: pain in lower back hip area  Have you had these symptoms before?: No  How long have you been having these symptoms?: 1-2 weeks  What is the  primary reason for your visit?: Other    Transcribed from ambient dictation for Teofilo Ordonez, DO by Michelle Fowler.  11/10/22   12:19 EST

## 2022-11-13 DIAGNOSIS — R00.2 PALPITATIONS: ICD-10-CM

## 2022-11-14 RX ORDER — PROPRANOLOL HYDROCHLORIDE 80 MG/1
CAPSULE, EXTENDED RELEASE ORAL
Qty: 30 CAPSULE | Refills: 0 | Status: SHIPPED | OUTPATIENT
Start: 2022-11-14 | End: 2022-12-14

## 2022-11-27 PROBLEM — Z79.899 LONG TERM CURRENT USE OF THERAPEUTIC DRUG: Status: ACTIVE | Noted: 2022-08-17

## 2022-11-27 PROBLEM — M54.12 CERVICAL RADICULOPATHY: Status: ACTIVE | Noted: 2021-11-02

## 2022-12-14 DIAGNOSIS — R00.2 PALPITATIONS: ICD-10-CM

## 2022-12-14 RX ORDER — PROPRANOLOL HYDROCHLORIDE 80 MG/1
CAPSULE, EXTENDED RELEASE ORAL
Qty: 30 CAPSULE | Refills: 2 | Status: SHIPPED | OUTPATIENT
Start: 2022-12-14 | End: 2023-01-27 | Stop reason: HOSPADM

## 2023-01-03 ENCOUNTER — OFFICE VISIT (OUTPATIENT)
Dept: FAMILY MEDICINE CLINIC | Facility: CLINIC | Age: 63
End: 2023-01-03
Payer: COMMERCIAL

## 2023-01-03 VITALS
BODY MASS INDEX: 22.69 KG/M2 | RESPIRATION RATE: 18 BRPM | WEIGHT: 136.2 LBS | SYSTOLIC BLOOD PRESSURE: 143 MMHG | DIASTOLIC BLOOD PRESSURE: 92 MMHG | OXYGEN SATURATION: 97 % | HEART RATE: 68 BPM | TEMPERATURE: 98.2 F | HEIGHT: 65 IN

## 2023-01-03 DIAGNOSIS — D84.821 IMMUNOSUPPRESSION DUE TO DRUG THERAPY: Chronic | ICD-10-CM

## 2023-01-03 DIAGNOSIS — R73.01 IMPAIRED FASTING GLUCOSE: ICD-10-CM

## 2023-01-03 DIAGNOSIS — M79.89 SWELLING OF BOTH LOWER EXTREMITIES: Primary | Chronic | ICD-10-CM

## 2023-01-03 DIAGNOSIS — Z79.899 IMMUNOSUPPRESSION DUE TO DRUG THERAPY: Chronic | ICD-10-CM

## 2023-01-03 DIAGNOSIS — G89.4 CHRONIC PAIN SYNDROME: Chronic | ICD-10-CM

## 2023-01-03 DIAGNOSIS — Z79.899 LONG TERM CURRENT USE OF IMMUNOSUPPRESSIVE DRUG: ICD-10-CM

## 2023-01-03 DIAGNOSIS — E55.9 VITAMIN D DEFICIENCY: Chronic | ICD-10-CM

## 2023-01-03 DIAGNOSIS — M32.9 LUPUS: Chronic | ICD-10-CM

## 2023-01-03 DIAGNOSIS — R63.5 WEIGHT GAIN: ICD-10-CM

## 2023-01-03 DIAGNOSIS — G57.93 NEUROPATHY OF BOTH FEET: Chronic | ICD-10-CM

## 2023-01-03 DIAGNOSIS — F41.9 ANXIETY DISORDER, UNSPECIFIED TYPE: Chronic | ICD-10-CM

## 2023-01-03 DIAGNOSIS — F51.01 PRIMARY INSOMNIA: Chronic | ICD-10-CM

## 2023-01-03 DIAGNOSIS — R13.19 ESOPHAGEAL DYSPHAGIA: ICD-10-CM

## 2023-01-03 DIAGNOSIS — E78.2 MIXED HYPERLIPIDEMIA: Chronic | ICD-10-CM

## 2023-01-03 PROBLEM — M79.18 MYOFASCIAL PAIN: Status: ACTIVE | Noted: 2022-11-23

## 2023-01-03 PROBLEM — Z79.4 ENCOUNTER FOR LONG-TERM (CURRENT) USE OF INSULIN: Status: ACTIVE | Noted: 2022-02-09

## 2023-01-03 PROCEDURE — 99214 OFFICE O/P EST MOD 30 MIN: CPT | Performed by: FAMILY MEDICINE

## 2023-01-03 RX ORDER — ALENDRONATE SODIUM 70 MG/1
70 TABLET ORAL
Qty: 12 TABLET | Refills: 3 | Status: SHIPPED | OUTPATIENT
Start: 2023-01-03 | End: 2023-04-03

## 2023-01-03 RX ORDER — CHLORTHALIDONE 25 MG/1
25 TABLET ORAL DAILY
Qty: 30 TABLET | Refills: 0 | Status: SHIPPED | OUTPATIENT
Start: 2023-01-03 | End: 2023-01-27 | Stop reason: HOSPADM

## 2023-01-03 NOTE — PROGRESS NOTES
Chief Complaint  Edema (Wrists, hands, feet and legs.)    Subjective          Danielle Manuel presents to [facility]  History of Present Illness    The patient presents today with complaints of swelling.    The patient states that she is unable to wear shoes or ambulate without experiencing excruciating pain. She notes that he has been taking mycophenolate for over 1 year for lupus. She reports that she has heartburn and dyspepsia. She states that she sees her rheumatologist, Dr. Naik, once a month for her pain and hips. She notes that she was informed that she needed to discuss her kidneys and lungs with her primary care physician. She reports pain located right between her shoulder blades and that he has chest x-rays ordered. She states that she has never had high blood pressure. She notes that her blood pressure was 150 systolic the first time it was taken and the second time it was 143 systolic. She currently takes propranolol, she states that she checks her blood pressure at home with a wrist cuff. She notes that her  weight seems to be up, but feels it is related to her swelling, her weight at home this morning was 131pounds, in clinic she weighed 136 pounds and has not eaten today. She notes that she does not take anti-inflammatories. She reports she got scared with the swelling because she wears a Fitbit all the time that she thought would need to be cut off, she was unable to wear a ring she has had for years, and her feet swell so bad they hurt. She notes that she sits a lot and tries to get up and down all the time. She states that she broke her T9 about 2 years ago and has had osteoporosis for a good while and takes alendronate. She notes that she loves walking and it helps with her hip pain, but her feet hurt her so bad she can no longer walk much. The patient states she seen Dr. Oneil once in 2021and was given a cream. She states that she has not seen   "Naik in a long time, she notes that he increased her dose of mycophenolate from 2 500mg daily to 2 500mg twice a day . She notes that she sees [facility] once a month because that it is the only way that she can get her tramadol and gabapentin refilled. She notes that she has arthritis in her neck. She states that she has had trigger injections and an epidural injection. She notes that she was informed that she needs to talk to her family doctor about her lungs.      Objective   Vital Signs:   /92 (BP Location: Left arm, Patient Position: Sitting, Cuff Size: Adult)   Pulse 68   Temp 98.2 °F (36.8 °C) (Temporal)   Resp 18   Ht 165.1 cm (65\")   Wt 61.8 kg (136 lb 3.2 oz)   SpO2 97%   BMI 22.66 kg/m²     BMI is within normal parameters. No other follow-up for BMI required.      Physical Exam  Vitals reviewed.   Constitutional:       Appearance: Normal appearance. She is well-developed.   HENT:      Head: Normocephalic and atraumatic.      Right Ear: External ear normal.      Left Ear: External ear normal.      Nose: Nose normal.      Mouth/Throat:      Pharynx: No oropharyngeal exudate.   Eyes:      Conjunctiva/sclera: Conjunctivae normal.      Pupils: Pupils are equal, round, and reactive to light.   Cardiovascular:      Rate and Rhythm: Normal rate.   Pulmonary:      Effort: Pulmonary effort is normal.      Breath sounds: Normal breath sounds.   Abdominal:      General: Abdomen is flat. There is no distension.      Palpations: Abdomen is soft.   Musculoskeletal:      Right lower leg: Tenderness present. Edema present.      Left lower leg: Tenderness present. Edema present.   Skin:     General: Skin is warm and dry.   Neurological:      General: No focal deficit present.      Mental Status: She is alert and oriented to person, place, and time.   Psychiatric:         Mood and Affect: Mood and affect normal.         Behavior: Behavior normal.         Thought Content: Thought content normal.        "  Judgment: Judgment normal.          Result Review :   The following data was reviewed by: Teofilo Ordonez DO on 01/03/2023:  Common labs    Common Labs 1/26/23 1/26/23 1/26/23 1/27/23 2/6/23 2/6/23 2/6/23 2/6/23 2/6/23    0025 0508 1122  1110 1110 1110 1110 1110   Glucose  101 (A)  105 (A)    86    BUN  7 (A)  13    7 (A)    Creatinine  0.73  0.89    0.80    Sodium 130 (A) 131 (A) 128 (A) 132 (A)    134 (A)    Potassium  3.5  4.5    4.6    Chloride  98  98    100    Calcium  8.3 (A)  8.9    9.7    Albumin        4.3    WBC     4.69       Hemoglobin     13.7       Hematocrit     41.4       Platelets     237       Total Cholesterol       222 (A)     Triglycerides       102     HDL Cholesterol       85 (A)     LDL Cholesterol        119 (A)     Hemoglobin A1C      5.50      Microalbumin, Urine         <1.2   (A) Abnormal value            Data reviewed: Radiologic studies Hip xray bilateral from 11/2022 with old pubic rami fractures, SI joint degenerative changes, MRI c spine with worsening degeneration, chronic   X-rays of her back on 02/20/2022 show stable T9 fracture, mild kyphosis.             Assessment and Plan    Diagnoses and all orders for this visit:    1. Swelling of both lower extremities (Primary)  -     TSH+Free T4; Future  -     Hemoglobin A1c; Future  -     CBC (No Diff); Future  -     Vitamin B12; Future  -     Folate; Future  -     BNP  -     Lipid panel; Future  -     MicroAlbumin, Urine, Random - Urine, Clean Catch; Future  -     Footwear Diabetic    2. Lupus (HCC)  -     TSH+Free T4; Future  -     Hemoglobin A1c; Future  -     CBC (No Diff); Future  -     Vitamin B12; Future  -     Folate; Future  -     BNP  -     Lipid panel; Future  -     MicroAlbumin, Urine, Random - Urine, Clean Catch; Future    3. Chronic pain syndrome  -     TSH+Free T4; Future  -     Hemoglobin A1c; Future  -     CBC (No Diff); Future  -     Vitamin B12; Future  -     Folate; Future  -     BNP  -     Lipid panel; Future  -      MicroAlbumin, Urine, Random - Urine, Clean Catch; Future    4. Primary insomnia  -     TSH+Free T4; Future  -     Hemoglobin A1c; Future  -     CBC (No Diff); Future  -     Vitamin B12; Future  -     Folate; Future  -     BNP  -     Lipid panel; Future  -     MicroAlbumin, Urine, Random - Urine, Clean Catch; Future    5. Vitamin D deficiency  -     TSH+Free T4; Future  -     Hemoglobin A1c; Future  -     CBC (No Diff); Future  -     Vitamin B12; Future  -     Folate; Future  -     BNP  -     Lipid panel; Future  -     MicroAlbumin, Urine, Random - Urine, Clean Catch; Future    6. Anxiety disorder, unspecified type  -     TSH+Free T4; Future  -     Hemoglobin A1c; Future  -     CBC (No Diff); Future  -     Vitamin B12; Future  -     Folate; Future  -     BNP  -     Lipid panel; Future  -     MicroAlbumin, Urine, Random - Urine, Clean Catch; Future    7. Neuropathy of both feet  -     Footwear Diabetic    8. Mixed hyperlipidemia  -     TSH+Free T4; Future  -     Hemoglobin A1c; Future  -     CBC (No Diff); Future  -     Vitamin B12; Future  -     Folate; Future  -     BNP  -     Lipid panel; Future  -     MicroAlbumin, Urine, Random - Urine, Clean Catch; Future    9. Immunosuppression due to drug therapy (HCC)  -     TSH+Free T4; Future  -     Hemoglobin A1c; Future  -     CBC (No Diff); Future  -     Vitamin B12; Future  -     Folate; Future  -     BNP  -     Lipid panel; Future  -     MicroAlbumin, Urine, Random - Urine, Clean Catch; Future    10. Esophageal dysphagia  -     TSH+Free T4; Future  -     Hemoglobin A1c; Future  -     CBC (No Diff); Future  -     Vitamin B12; Future  -     Folate; Future  -     BNP  -     Lipid panel; Future  -     MicroAlbumin, Urine, Random - Urine, Clean Catch; Future    11. Weight gain  -     TSH+Free T4; Future  -     Hemoglobin A1c; Future  -     CBC (No Diff); Future  -     Vitamin B12; Future  -     Folate; Future  -     BNP  -     Lipid panel; Future  -     MicroAlbumin, Urine,  Random - Urine, Clean Catch; Future    12. Impaired fasting glucose  -     TSH+Free T4; Future  -     Hemoglobin A1c; Future  -     CBC (No Diff); Future  -     Vitamin B12; Future  -     Folate; Future  -     BNP  -     Lipid panel; Future  -     MicroAlbumin, Urine, Random - Urine, Clean Catch; Future    13. Long term current use of immunosuppressive drug  -     TSH+Free T4; Future  -     Hemoglobin A1c; Future  -     CBC (No Diff); Future  -     Vitamin B12; Future  -     Folate; Future  -     BNP  -     Lipid panel; Future  -     MicroAlbumin, Urine, Random - Urine, Clean Catch; Future    Other orders  -     alendronate (FOSAMAX) 70 MG tablet; Take 1 tablet by mouth Every 7 (Seven) Days for 90 days.  Dispense: 12 tablet; Refill: 3  -     Discontinue: chlorthalidone (HYGROTON) 25 MG tablet; Take 1 tablet by mouth Daily.  Dispense: 30 tablet; Refill: 0         Hypertension  - Monitor blood pressure at home.  - Goal of 140/90 mmHg.  - Monitor edema, see if improved with blood pressure medicine and lowering low blood pressure.  - Discussed mycophenolate monitoring and following up with specialist given long course of this medication.  - Continue to monitor kidney function and other thing contributing to lupus long term.  - Try to get some shoes that would better fit for her feet condition.  - Follow-up podiatry and labs ordered and pending.    Chronic conditions  Lupus, Anxiety, long term medication use, chronic pain disorder  - Continue to follow up with pain management, AYLA reviewed  - counseled on continued use of benzo for anxiety, understands risks benefits  - follow up with rheum or other specialist, derm, for lupus and ongoing and more complicated bilaterally foot issues, neuropathy  - Has contract for above, AYLA and appropriate UDS    Osteoporosis  - Compression fracture T9.  - Continue to recommend some exercises, therapy, activity to improve on alendronate.  - Continue other medicines as  prescribed.  - Follow up with specialist to review conditions.    Burning skin (neuropathy)   Lupus, discoid conditions  - Close follow-up, consider seeing specialist rheum if not already scheduled   - treat for neuropathy, review medicines and see if anything could worsen symptoms or other    Follow Up   Return in about 4 weeks (around 1/31/2023) for Labs before, Recheck.  Patient was given instructions and counseling regarding her condition or for health maintenance advice. Please see specific information pulled into the AVS if appropriate.     Transcribed from ambient dictation for Teofilo Ordonez DO by Elizabeth Hernández.  01/03/23   16:22 EST    Patient or patient representative verbalized consent to the visit recording.  I have personally performed the services described in this document as transcribed by the above individual, and it is both accurate and complete.  Answers for HPI/ROS submitted by the patient on 12/27/2022  Please describe your symptoms.: Swelling causing excruciating pain  Have you had these symptoms before?: No  How long have you been having these symptoms?: 1-2 weeks  Please list any medications you are currently taking for this condition.: None  Please describe any probable cause for these symptoms. : Not sure  What is the primary reason for your visit?: Other

## 2023-01-24 ENCOUNTER — APPOINTMENT (OUTPATIENT)
Dept: CT IMAGING | Facility: HOSPITAL | Age: 63
DRG: 641 | End: 2023-01-24
Payer: COMMERCIAL

## 2023-01-24 ENCOUNTER — APPOINTMENT (OUTPATIENT)
Dept: GENERAL RADIOLOGY | Facility: HOSPITAL | Age: 63
DRG: 641 | End: 2023-01-24
Payer: COMMERCIAL

## 2023-01-24 ENCOUNTER — HOSPITAL ENCOUNTER (INPATIENT)
Facility: HOSPITAL | Age: 63
LOS: 3 days | Discharge: HOME | DRG: 641 | End: 2023-01-27
Attending: EMERGENCY MEDICINE | Admitting: INTERNAL MEDICINE
Payer: COMMERCIAL

## 2023-01-24 DIAGNOSIS — R26.2 DIFFICULTY WALKING: ICD-10-CM

## 2023-01-24 DIAGNOSIS — E87.1 HYPONATREMIA: Primary | ICD-10-CM

## 2023-01-24 DIAGNOSIS — E87.6 HYPOKALEMIA: ICD-10-CM

## 2023-01-24 DIAGNOSIS — Z78.9 DECREASED ACTIVITIES OF DAILY LIVING (ADL): ICD-10-CM

## 2023-01-24 LAB
ALBUMIN SERPL-MCNC: 3.9 G/DL (ref 3.5–5.2)
ALBUMIN/GLOB SERPL: 1.3 G/DL
ALP SERPL-CCNC: 84 U/L (ref 39–117)
ALT SERPL W P-5'-P-CCNC: 14 U/L (ref 1–33)
ANION GAP SERPL CALCULATED.3IONS-SCNC: 14 MMOL/L (ref 5–15)
AST SERPL-CCNC: 21 U/L (ref 1–32)
BASOPHILS # BLD AUTO: 0.03 10*3/MM3 (ref 0–0.2)
BASOPHILS NFR BLD AUTO: 0.3 % (ref 0–1.5)
BILIRUB SERPL-MCNC: 1 MG/DL (ref 0–1.2)
BUN SERPL-MCNC: 11 MG/DL (ref 8–23)
BUN/CREAT SERPL: 15.3 (ref 7–25)
CALCIUM SPEC-SCNC: 8.4 MG/DL (ref 8.6–10.5)
CHLORIDE SERPL-SCNC: 74 MMOL/L (ref 98–107)
CO2 SERPL-SCNC: 29 MMOL/L (ref 22–29)
CREAT SERPL-MCNC: 0.72 MG/DL (ref 0.57–1)
CREAT UR-MCNC: 31 MG/DL
DEPRECATED RDW RBC AUTO: 36.4 FL (ref 37–54)
EGFRCR SERPLBLD CKD-EPI 2021: 95.3 ML/MIN/1.73
EOSINOPHIL # BLD AUTO: 0.06 10*3/MM3 (ref 0–0.4)
EOSINOPHIL NFR BLD AUTO: 0.5 % (ref 0.3–6.2)
ERYTHROCYTE [DISTWIDTH] IN BLOOD BY AUTOMATED COUNT: 11.8 % (ref 12.3–15.4)
FLUAV AG NPH QL: NEGATIVE
FLUBV AG NPH QL IA: NEGATIVE
GLOBULIN UR ELPH-MCNC: 3.1 GM/DL
GLUCOSE SERPL-MCNC: 106 MG/DL (ref 65–99)
HCT VFR BLD AUTO: 39.7 % (ref 34–46.6)
HGB BLD-MCNC: 14.7 G/DL (ref 12–15.9)
HOLD SPECIMEN: NORMAL
HOLD SPECIMEN: NORMAL
IMM GRANULOCYTES # BLD AUTO: 0.06 10*3/MM3 (ref 0–0.05)
IMM GRANULOCYTES NFR BLD AUTO: 0.5 % (ref 0–0.5)
LYMPHOCYTES # BLD AUTO: 0.7 10*3/MM3 (ref 0.7–3.1)
LYMPHOCYTES NFR BLD AUTO: 6.4 % (ref 19.6–45.3)
MAGNESIUM SERPL-MCNC: 2.1 MG/DL (ref 1.6–2.4)
MCH RBC QN AUTO: 31.7 PG (ref 26.6–33)
MCHC RBC AUTO-ENTMCNC: 37 G/DL (ref 31.5–35.7)
MCV RBC AUTO: 85.6 FL (ref 79–97)
MONOCYTES # BLD AUTO: 1.15 10*3/MM3 (ref 0.1–0.9)
MONOCYTES NFR BLD AUTO: 10.5 % (ref 5–12)
NEUTROPHILS NFR BLD AUTO: 8.96 10*3/MM3 (ref 1.7–7)
NEUTROPHILS NFR BLD AUTO: 81.8 % (ref 42.7–76)
NRBC BLD AUTO-RTO: 0 /100 WBC (ref 0–0.2)
PHOSPHATE SERPL-MCNC: 2.7 MG/DL (ref 2.5–4.5)
PLATELET # BLD AUTO: 193 10*3/MM3 (ref 140–450)
PMV BLD AUTO: 10.9 FL (ref 6–12)
POTASSIUM SERPL-SCNC: 2.4 MMOL/L (ref 3.5–5.2)
PROCALCITONIN SERPL-MCNC: 0.11 NG/ML (ref 0–0.25)
PROT SERPL-MCNC: 7 G/DL (ref 6–8.5)
RBC # BLD AUTO: 4.64 10*6/MM3 (ref 3.77–5.28)
SARS-COV-2 RNA PNL SPEC NAA+PROBE: NOT DETECTED
SODIUM SERPL-SCNC: 117 MMOL/L (ref 136–145)
SODIUM UR-SCNC: <20 MMOL/L
T4 FREE SERPL-MCNC: 2.02 NG/DL (ref 0.93–1.7)
TROPONIN T SERPL-MCNC: <0.01 NG/ML (ref 0–0.03)
TSH SERPL DL<=0.05 MIU/L-ACNC: 0.87 UIU/ML (ref 0.27–4.2)
WBC NRBC COR # BLD: 10.96 10*3/MM3 (ref 3.4–10.8)
WHOLE BLOOD HOLD COAG: NORMAL
WHOLE BLOOD HOLD SPECIMEN: NORMAL

## 2023-01-24 PROCEDURE — U0004 COV-19 TEST NON-CDC HGH THRU: HCPCS | Performed by: NURSE PRACTITIONER

## 2023-01-24 PROCEDURE — 85025 COMPLETE CBC W/AUTO DIFF WBC: CPT

## 2023-01-24 PROCEDURE — 84300 ASSAY OF URINE SODIUM: CPT | Performed by: EMERGENCY MEDICINE

## 2023-01-24 PROCEDURE — 80053 COMPREHEN METABOLIC PANEL: CPT

## 2023-01-24 PROCEDURE — 82570 ASSAY OF URINE CREATININE: CPT | Performed by: EMERGENCY MEDICINE

## 2023-01-24 PROCEDURE — 71045 X-RAY EXAM CHEST 1 VIEW: CPT

## 2023-01-24 PROCEDURE — 99223 1ST HOSP IP/OBS HIGH 75: CPT | Performed by: INTERNAL MEDICINE

## 2023-01-24 PROCEDURE — 93010 ELECTROCARDIOGRAM REPORT: CPT | Performed by: INTERNAL MEDICINE

## 2023-01-24 PROCEDURE — 87804 INFLUENZA ASSAY W/OPTIC: CPT | Performed by: NURSE PRACTITIONER

## 2023-01-24 PROCEDURE — 84439 ASSAY OF FREE THYROXINE: CPT | Performed by: INTERNAL MEDICINE

## 2023-01-24 PROCEDURE — 83735 ASSAY OF MAGNESIUM: CPT

## 2023-01-24 PROCEDURE — 84443 ASSAY THYROID STIM HORMONE: CPT | Performed by: INTERNAL MEDICINE

## 2023-01-24 PROCEDURE — 84100 ASSAY OF PHOSPHORUS: CPT | Performed by: INTERNAL MEDICINE

## 2023-01-24 PROCEDURE — 99285 EMERGENCY DEPT VISIT HI MDM: CPT

## 2023-01-24 PROCEDURE — 74176 CT ABD & PELVIS W/O CONTRAST: CPT

## 2023-01-24 PROCEDURE — 25010000002 ENOXAPARIN PER 10 MG: Performed by: INTERNAL MEDICINE

## 2023-01-24 PROCEDURE — 0 POTASSIUM CHLORIDE 10 MEQ/100ML SOLUTION: Performed by: EMERGENCY MEDICINE

## 2023-01-24 PROCEDURE — 93005 ELECTROCARDIOGRAM TRACING: CPT | Performed by: EMERGENCY MEDICINE

## 2023-01-24 PROCEDURE — 84145 PROCALCITONIN (PCT): CPT | Performed by: INTERNAL MEDICINE

## 2023-01-24 PROCEDURE — 25010000002 SODIUM CHLORIDE 0.9 % WITH KCL 20 MEQ 20-0.9 MEQ/L-% SOLUTION: Performed by: INTERNAL MEDICINE

## 2023-01-24 PROCEDURE — 84484 ASSAY OF TROPONIN QUANT: CPT

## 2023-01-24 PROCEDURE — 93005 ELECTROCARDIOGRAM TRACING: CPT

## 2023-01-24 RX ORDER — SODIUM CHLORIDE AND POTASSIUM CHLORIDE 150; 900 MG/100ML; MG/100ML
100 INJECTION, SOLUTION INTRAVENOUS CONTINUOUS
Status: DISCONTINUED | OUTPATIENT
Start: 2023-01-24 | End: 2023-01-25

## 2023-01-24 RX ORDER — SODIUM CHLORIDE 0.9 % (FLUSH) 0.9 %
10 SYRINGE (ML) INJECTION EVERY 12 HOURS SCHEDULED
Status: DISCONTINUED | OUTPATIENT
Start: 2023-01-24 | End: 2023-01-27 | Stop reason: HOSPADM

## 2023-01-24 RX ORDER — POTASSIUM CHLORIDE 7.45 MG/ML
10 INJECTION INTRAVENOUS
Status: DISCONTINUED | OUTPATIENT
Start: 2023-01-24 | End: 2023-01-24

## 2023-01-24 RX ORDER — SODIUM CHLORIDE 9 MG/ML
125 INJECTION, SOLUTION INTRAVENOUS CONTINUOUS
Status: DISCONTINUED | OUTPATIENT
Start: 2023-01-24 | End: 2023-01-24

## 2023-01-24 RX ORDER — GABAPENTIN 300 MG/1
300 CAPSULE ORAL ONCE
Status: COMPLETED | OUTPATIENT
Start: 2023-01-24 | End: 2023-01-24

## 2023-01-24 RX ORDER — ACETAMINOPHEN 325 MG/1
650 TABLET ORAL EVERY 4 HOURS PRN
Status: DISCONTINUED | OUTPATIENT
Start: 2023-01-24 | End: 2023-01-27 | Stop reason: HOSPADM

## 2023-01-24 RX ORDER — POTASSIUM CHLORIDE 750 MG/1
40 CAPSULE, EXTENDED RELEASE ORAL ONCE
Status: COMPLETED | OUTPATIENT
Start: 2023-01-24 | End: 2023-01-24

## 2023-01-24 RX ORDER — NICOTINE 21 MG/24HR
1 PATCH, TRANSDERMAL 24 HOURS TRANSDERMAL EVERY 24 HOURS
Status: DISCONTINUED | OUTPATIENT
Start: 2023-01-24 | End: 2023-01-27 | Stop reason: HOSPADM

## 2023-01-24 RX ORDER — POTASSIUM CHLORIDE 7.45 MG/ML
10 INJECTION INTRAVENOUS ONCE
Status: COMPLETED | OUTPATIENT
Start: 2023-01-24 | End: 2023-01-24

## 2023-01-24 RX ORDER — POTASSIUM CHLORIDE 7.45 MG/ML
10 INJECTION INTRAVENOUS
Status: DISPENSED | OUTPATIENT
Start: 2023-01-24 | End: 2023-01-24

## 2023-01-24 RX ORDER — LORAZEPAM 0.5 MG/1
0.5 TABLET ORAL EVERY 6 HOURS PRN
Status: DISCONTINUED | OUTPATIENT
Start: 2023-01-24 | End: 2023-01-25

## 2023-01-24 RX ORDER — LORAZEPAM 2 MG/ML
0.5 INJECTION INTRAMUSCULAR EVERY 6 HOURS PRN
Status: DISCONTINUED | OUTPATIENT
Start: 2023-01-24 | End: 2023-01-25

## 2023-01-24 RX ORDER — SODIUM CHLORIDE 9 MG/ML
40 INJECTION, SOLUTION INTRAVENOUS AS NEEDED
Status: DISCONTINUED | OUTPATIENT
Start: 2023-01-24 | End: 2023-01-27 | Stop reason: HOSPADM

## 2023-01-24 RX ORDER — ENOXAPARIN SODIUM 100 MG/ML
40 INJECTION SUBCUTANEOUS DAILY
Status: DISCONTINUED | OUTPATIENT
Start: 2023-01-24 | End: 2023-01-25

## 2023-01-24 RX ORDER — SODIUM CHLORIDE 0.9 % (FLUSH) 0.9 %
10 SYRINGE (ML) INJECTION AS NEEDED
Status: DISCONTINUED | OUTPATIENT
Start: 2023-01-24 | End: 2023-01-27 | Stop reason: HOSPADM

## 2023-01-24 RX ADMIN — POTASSIUM CHLORIDE 10 MEQ: 7.46 INJECTION, SOLUTION INTRAVENOUS at 19:45

## 2023-01-24 RX ADMIN — POTASSIUM CHLORIDE 40 MEQ: 10 CAPSULE, COATED, EXTENDED RELEASE ORAL at 15:55

## 2023-01-24 RX ADMIN — Medication 1 PATCH: at 18:53

## 2023-01-24 RX ADMIN — POTASSIUM CHLORIDE AND SODIUM CHLORIDE 100 ML/HR: 900; 150 INJECTION, SOLUTION INTRAVENOUS at 17:57

## 2023-01-24 RX ADMIN — POTASSIUM CHLORIDE 40 MEQ: 10 CAPSULE, COATED, EXTENDED RELEASE ORAL at 21:34

## 2023-01-24 RX ADMIN — POTASSIUM CHLORIDE 10 MEQ: 7.46 INJECTION, SOLUTION INTRAVENOUS at 17:57

## 2023-01-24 RX ADMIN — ENOXAPARIN SODIUM 40 MG: 100 INJECTION SUBCUTANEOUS at 17:57

## 2023-01-24 RX ADMIN — GABAPENTIN 300 MG: 300 CAPSULE ORAL at 21:34

## 2023-01-24 RX ADMIN — Medication 10 ML: at 21:34

## 2023-01-24 RX ADMIN — SODIUM CHLORIDE 1000 ML: 9 INJECTION, SOLUTION INTRAVENOUS at 13:07

## 2023-01-24 RX ADMIN — POTASSIUM CHLORIDE 10 MEQ: 7.46 INJECTION, SOLUTION INTRAVENOUS at 15:41

## 2023-01-24 RX ADMIN — POTASSIUM CHLORIDE 10 MEQ: 7.46 INJECTION, SOLUTION INTRAVENOUS at 22:28

## 2023-01-24 RX ADMIN — SODIUM CHLORIDE 125 ML/HR: 9 INJECTION, SOLUTION INTRAVENOUS at 15:42

## 2023-01-25 LAB
ANION GAP SERPL CALCULATED.3IONS-SCNC: 6.4 MMOL/L (ref 5–15)
ANION GAP SERPL CALCULATED.3IONS-SCNC: 7.8 MMOL/L (ref 5–15)
BACTERIA UR QL AUTO: ABNORMAL /HPF
BILIRUB UR QL STRIP: NEGATIVE
BUN SERPL-MCNC: 11 MG/DL (ref 8–23)
BUN SERPL-MCNC: 7 MG/DL (ref 8–23)
BUN/CREAT SERPL: 10.3 (ref 7–25)
BUN/CREAT SERPL: 15.7 (ref 7–25)
CALCIUM SPEC-SCNC: 8.3 MG/DL (ref 8.6–10.5)
CALCIUM SPEC-SCNC: 8.5 MG/DL (ref 8.6–10.5)
CHLORIDE SERPL-SCNC: 92 MMOL/L (ref 98–107)
CHLORIDE SERPL-SCNC: 93 MMOL/L (ref 98–107)
CLARITY UR: CLEAR
CO2 SERPL-SCNC: 27.2 MMOL/L (ref 22–29)
CO2 SERPL-SCNC: 27.6 MMOL/L (ref 22–29)
COLOR UR: YELLOW
CREAT SERPL-MCNC: 0.68 MG/DL (ref 0.57–1)
CREAT SERPL-MCNC: 0.7 MG/DL (ref 0.57–1)
EGFRCR SERPLBLD CKD-EPI 2021: 98.5 ML/MIN/1.73
EGFRCR SERPLBLD CKD-EPI 2021: 99.2 ML/MIN/1.73
GLUCOSE SERPL-MCNC: 110 MG/DL (ref 65–99)
GLUCOSE SERPL-MCNC: 95 MG/DL (ref 65–99)
GLUCOSE UR STRIP-MCNC: NEGATIVE MG/DL
HGB UR QL STRIP.AUTO: NEGATIVE
HYALINE CASTS UR QL AUTO: ABNORMAL /LPF
KETONES UR QL STRIP: NEGATIVE
LEUKOCYTE ESTERASE UR QL STRIP.AUTO: ABNORMAL
MAGNESIUM SERPL-MCNC: 2.4 MG/DL (ref 1.6–2.4)
NITRITE UR QL STRIP: NEGATIVE
PH UR STRIP.AUTO: 7.5 [PH] (ref 5–8)
PHOSPHATE SERPL-MCNC: 1.6 MG/DL (ref 2.5–4.5)
POTASSIUM SERPL-SCNC: 3.5 MMOL/L (ref 3.5–5.2)
POTASSIUM SERPL-SCNC: 3.5 MMOL/L (ref 3.5–5.2)
PROT UR QL STRIP: NEGATIVE
RBC # UR STRIP: ABNORMAL /HPF
REF LAB TEST METHOD: ABNORMAL
SODIUM SERPL-SCNC: 126 MMOL/L (ref 136–145)
SODIUM SERPL-SCNC: 127 MMOL/L (ref 136–145)
SP GR UR STRIP: 1.01 (ref 1–1.03)
SQUAMOUS #/AREA URNS HPF: ABNORMAL /HPF
UROBILINOGEN UR QL STRIP: ABNORMAL
WBC # UR STRIP: ABNORMAL /HPF
WHOLE BLOOD HOLD SPECIMEN: NORMAL

## 2023-01-25 PROCEDURE — 80048 BASIC METABOLIC PNL TOTAL CA: CPT | Performed by: INTERNAL MEDICINE

## 2023-01-25 PROCEDURE — 97165 OT EVAL LOW COMPLEX 30 MIN: CPT

## 2023-01-25 PROCEDURE — 84295 ASSAY OF SERUM SODIUM: CPT | Performed by: INTERNAL MEDICINE

## 2023-01-25 PROCEDURE — 25010000002 SODIUM CHLORIDE 0.9 % WITH KCL 20 MEQ 20-0.9 MEQ/L-% SOLUTION: Performed by: INTERNAL MEDICINE

## 2023-01-25 PROCEDURE — 81001 URINALYSIS AUTO W/SCOPE: CPT | Performed by: INTERNAL MEDICINE

## 2023-01-25 PROCEDURE — 99221 1ST HOSP IP/OBS SF/LOW 40: CPT | Performed by: UROLOGY

## 2023-01-25 PROCEDURE — 94799 UNLISTED PULMONARY SVC/PX: CPT

## 2023-01-25 PROCEDURE — 94640 AIRWAY INHALATION TREATMENT: CPT

## 2023-01-25 PROCEDURE — 83735 ASSAY OF MAGNESIUM: CPT | Performed by: INTERNAL MEDICINE

## 2023-01-25 PROCEDURE — 97161 PT EVAL LOW COMPLEX 20 MIN: CPT

## 2023-01-25 PROCEDURE — 99233 SBSQ HOSP IP/OBS HIGH 50: CPT | Performed by: INTERNAL MEDICINE

## 2023-01-25 PROCEDURE — 84100 ASSAY OF PHOSPHORUS: CPT | Performed by: INTERNAL MEDICINE

## 2023-01-25 PROCEDURE — 36415 COLL VENOUS BLD VENIPUNCTURE: CPT | Performed by: INTERNAL MEDICINE

## 2023-01-25 PROCEDURE — 25010000002 ENOXAPARIN PER 10 MG: Performed by: INTERNAL MEDICINE

## 2023-01-25 RX ORDER — DEXTROSE MONOHYDRATE 50 MG/ML
50 INJECTION, SOLUTION INTRAVENOUS CONTINUOUS
Status: DISCONTINUED | OUTPATIENT
Start: 2023-01-25 | End: 2023-01-26

## 2023-01-25 RX ORDER — TRAMADOL HYDROCHLORIDE 50 MG/1
50 TABLET ORAL DAILY PRN
Status: DISCONTINUED | OUTPATIENT
Start: 2023-01-25 | End: 2023-01-27 | Stop reason: HOSPADM

## 2023-01-25 RX ORDER — FENTANYL/ROPIVACAINE/NS/PF 2-625MCG/1
15 PLASTIC BAG, INJECTION (ML) EPIDURAL EVERY 4 HOURS
Status: DISCONTINUED | OUTPATIENT
Start: 2023-01-25 | End: 2023-01-25

## 2023-01-25 RX ORDER — PANTOPRAZOLE SODIUM 40 MG/1
40 TABLET, DELAYED RELEASE ORAL DAILY
Status: DISCONTINUED | OUTPATIENT
Start: 2023-01-25 | End: 2023-01-27 | Stop reason: HOSPADM

## 2023-01-25 RX ORDER — GABAPENTIN 300 MG/1
300 CAPSULE ORAL 3 TIMES DAILY
Status: DISCONTINUED | OUTPATIENT
Start: 2023-01-25 | End: 2023-01-27 | Stop reason: HOSPADM

## 2023-01-25 RX ORDER — CLONAZEPAM 0.5 MG/1
1 TABLET ORAL NIGHTLY PRN
Status: DISCONTINUED | OUTPATIENT
Start: 2023-01-25 | End: 2023-01-27 | Stop reason: HOSPADM

## 2023-01-25 RX ORDER — FENTANYL/ROPIVACAINE/NS/PF 2-625MCG/1
15 PLASTIC BAG, INJECTION (ML) EPIDURAL
Status: COMPLETED | OUTPATIENT
Start: 2023-01-25 | End: 2023-01-25

## 2023-01-25 RX ADMIN — Medication 1 PATCH: at 15:16

## 2023-01-25 RX ADMIN — Medication 10 ML: at 20:53

## 2023-01-25 RX ADMIN — TRIAMCINOLONE ACETONIDE 1 APPLICATION: 1 OINTMENT TOPICAL at 15:13

## 2023-01-25 RX ADMIN — PANTOPRAZOLE SODIUM 40 MG: 40 TABLET, DELAYED RELEASE ORAL at 12:31

## 2023-01-25 RX ADMIN — GABAPENTIN 300 MG: 300 CAPSULE ORAL at 20:53

## 2023-01-25 RX ADMIN — Medication 10 ML: at 08:16

## 2023-01-25 RX ADMIN — TRIAMCINOLONE ACETONIDE 1 APPLICATION: 1 OINTMENT TOPICAL at 20:53

## 2023-01-25 RX ADMIN — POTASSIUM PHOSPHATE, MONOBASIC AND POTASSIUM PHOSPHATE, DIBASIC 15 MMOL: 224; 236 INJECTION, SOLUTION, CONCENTRATE INTRAVENOUS at 18:37

## 2023-01-25 RX ADMIN — POTASSIUM PHOSPHATE, MONOBASIC AND POTASSIUM PHOSPHATE, DIBASIC 15 MMOL: 224; 236 INJECTION, SOLUTION, CONCENTRATE INTRAVENOUS at 15:13

## 2023-01-25 RX ADMIN — DEXTROSE MONOHYDRATE 100 ML/HR: 50 INJECTION, SOLUTION INTRAVENOUS at 15:13

## 2023-01-25 RX ADMIN — IPRATROPIUM BROMIDE 0.5 MG: 0.5 SOLUTION RESPIRATORY (INHALATION) at 18:37

## 2023-01-25 RX ADMIN — ENOXAPARIN SODIUM 40 MG: 100 INJECTION SUBCUTANEOUS at 08:15

## 2023-01-25 RX ADMIN — POTASSIUM CHLORIDE AND SODIUM CHLORIDE 100 ML/HR: 900; 150 INJECTION, SOLUTION INTRAVENOUS at 00:15

## 2023-01-25 RX ADMIN — POTASSIUM PHOSPHATE, MONOBASIC AND POTASSIUM PHOSPHATE, DIBASIC 15 MMOL: 224; 236 INJECTION, SOLUTION, CONCENTRATE INTRAVENOUS at 22:16

## 2023-01-26 LAB
027 TOXIN: NORMAL
ANION GAP SERPL CALCULATED.3IONS-SCNC: 7.6 MMOL/L (ref 5–15)
BUN SERPL-MCNC: 7 MG/DL (ref 8–23)
BUN/CREAT SERPL: 9.6 (ref 7–25)
C DIFF TOX GENS STL QL NAA+PROBE: NEGATIVE
CALCIUM SPEC-SCNC: 8.3 MG/DL (ref 8.6–10.5)
CHLORIDE SERPL-SCNC: 98 MMOL/L (ref 98–107)
CO2 SERPL-SCNC: 25.4 MMOL/L (ref 22–29)
CREAT SERPL-MCNC: 0.73 MG/DL (ref 0.57–1)
EGFRCR SERPLBLD CKD-EPI 2021: 93.7 ML/MIN/1.73
GLUCOSE SERPL-MCNC: 101 MG/DL (ref 65–99)
LACTOFERRIN STL QL LA: POSITIVE
MAGNESIUM SERPL-MCNC: 2.2 MG/DL (ref 1.6–2.4)
PHOSPHATE SERPL-MCNC: 3.2 MG/DL (ref 2.5–4.5)
POTASSIUM SERPL-SCNC: 3.5 MMOL/L (ref 3.5–5.2)
SODIUM SERPL-SCNC: 128 MMOL/L (ref 136–145)
SODIUM SERPL-SCNC: 130 MMOL/L (ref 136–145)
SODIUM SERPL-SCNC: 131 MMOL/L (ref 136–145)
WHOLE BLOOD HOLD SPECIMEN: NORMAL

## 2023-01-26 PROCEDURE — 84295 ASSAY OF SERUM SODIUM: CPT | Performed by: INTERNAL MEDICINE

## 2023-01-26 PROCEDURE — 87493 C DIFF AMPLIFIED PROBE: CPT | Performed by: INTERNAL MEDICINE

## 2023-01-26 PROCEDURE — 83735 ASSAY OF MAGNESIUM: CPT | Performed by: INTERNAL MEDICINE

## 2023-01-26 PROCEDURE — 80048 BASIC METABOLIC PNL TOTAL CA: CPT | Performed by: INTERNAL MEDICINE

## 2023-01-26 PROCEDURE — 84100 ASSAY OF PHOSPHORUS: CPT | Performed by: INTERNAL MEDICINE

## 2023-01-26 PROCEDURE — 94799 UNLISTED PULMONARY SVC/PX: CPT

## 2023-01-26 PROCEDURE — 87505 NFCT AGENT DETECTION GI: CPT | Performed by: INTERNAL MEDICINE

## 2023-01-26 PROCEDURE — 99232 SBSQ HOSP IP/OBS MODERATE 35: CPT | Performed by: INTERNAL MEDICINE

## 2023-01-26 PROCEDURE — 83630 LACTOFERRIN FECAL (QUAL): CPT | Performed by: INTERNAL MEDICINE

## 2023-01-26 RX ADMIN — IPRATROPIUM BROMIDE 0.5 MG: 0.5 SOLUTION RESPIRATORY (INHALATION) at 06:50

## 2023-01-26 RX ADMIN — Medication 10 ML: at 10:14

## 2023-01-26 RX ADMIN — PANTOPRAZOLE SODIUM 40 MG: 40 TABLET, DELAYED RELEASE ORAL at 10:13

## 2023-01-26 RX ADMIN — IPRATROPIUM BROMIDE 0.5 MG: 0.5 SOLUTION RESPIRATORY (INHALATION) at 19:10

## 2023-01-26 RX ADMIN — Medication 1 PATCH: at 15:42

## 2023-01-26 RX ADMIN — GABAPENTIN 300 MG: 300 CAPSULE ORAL at 10:13

## 2023-01-26 RX ADMIN — GABAPENTIN 300 MG: 300 CAPSULE ORAL at 20:41

## 2023-01-26 RX ADMIN — IPRATROPIUM BROMIDE 0.5 MG: 0.5 SOLUTION RESPIRATORY (INHALATION) at 12:31

## 2023-01-26 RX ADMIN — GABAPENTIN 300 MG: 300 CAPSULE ORAL at 15:41

## 2023-01-26 RX ADMIN — TRIAMCINOLONE ACETONIDE 1 APPLICATION: 1 OINTMENT TOPICAL at 20:41

## 2023-01-26 RX ADMIN — TRIAMCINOLONE ACETONIDE 1 APPLICATION: 1 OINTMENT TOPICAL at 10:15

## 2023-01-26 RX ADMIN — IPRATROPIUM BROMIDE 0.5 MG: 0.5 SOLUTION RESPIRATORY (INHALATION) at 23:16

## 2023-01-26 RX ADMIN — CLONAZEPAM 1 MG: 0.5 TABLET ORAL at 03:14

## 2023-01-26 RX ADMIN — Medication 10 ML: at 20:42

## 2023-01-27 ENCOUNTER — READMISSION MANAGEMENT (OUTPATIENT)
Dept: CALL CENTER | Facility: HOSPITAL | Age: 63
End: 2023-01-27
Payer: COMMERCIAL

## 2023-01-27 VITALS
HEIGHT: 66 IN | BODY MASS INDEX: 22.25 KG/M2 | OXYGEN SATURATION: 90 % | DIASTOLIC BLOOD PRESSURE: 76 MMHG | RESPIRATION RATE: 18 BRPM | SYSTOLIC BLOOD PRESSURE: 108 MMHG | HEART RATE: 56 BPM | WEIGHT: 138.45 LBS | TEMPERATURE: 97.4 F

## 2023-01-27 LAB
ANION GAP SERPL CALCULATED.3IONS-SCNC: 4.9 MMOL/L (ref 5–15)
BUN SERPL-MCNC: 13 MG/DL (ref 8–23)
BUN/CREAT SERPL: 14.6 (ref 7–25)
C COLI+JEJ+UPSA DNA STL QL NAA+NON-PROBE: NOT DETECTED
CALCIUM SPEC-SCNC: 8.9 MG/DL (ref 8.6–10.5)
CHLORIDE SERPL-SCNC: 98 MMOL/L (ref 98–107)
CO2 SERPL-SCNC: 29.1 MMOL/L (ref 22–29)
CREAT SERPL-MCNC: 0.89 MG/DL (ref 0.57–1)
EC STX1+STX2 GENES STL QL NAA+NON-PROBE: NOT DETECTED
EGFRCR SERPLBLD CKD-EPI 2021: 73.9 ML/MIN/1.73
GLUCOSE SERPL-MCNC: 105 MG/DL (ref 65–99)
POTASSIUM SERPL-SCNC: 4.5 MMOL/L (ref 3.5–5.2)
QT INTERVAL: 548 MS
S ENT+BONG DNA STL QL NAA+NON-PROBE: NOT DETECTED
SHIGELLA SP+EIEC IPAH ST NAA+NON-PROBE: NOT DETECTED
SODIUM SERPL-SCNC: 132 MMOL/L (ref 136–145)
WHOLE BLOOD HOLD SPECIMEN: NORMAL

## 2023-01-27 PROCEDURE — 80048 BASIC METABOLIC PNL TOTAL CA: CPT | Performed by: INTERNAL MEDICINE

## 2023-01-27 PROCEDURE — 99239 HOSP IP/OBS DSCHRG MGMT >30: CPT | Performed by: INTERNAL MEDICINE

## 2023-01-27 PROCEDURE — 94799 UNLISTED PULMONARY SVC/PX: CPT

## 2023-01-27 RX ORDER — CHOLECALCIFEROL (VITAMIN D3) 125 MCG
CAPSULE ORAL
Status: COMPLETED
Start: 2023-01-27 | End: 2023-01-27

## 2023-01-27 RX ORDER — CHOLECALCIFEROL (VITAMIN D3) 125 MCG
10 CAPSULE ORAL NIGHTLY
Status: DISCONTINUED | OUTPATIENT
Start: 2023-01-27 | End: 2023-01-27 | Stop reason: HOSPADM

## 2023-01-27 RX ADMIN — GABAPENTIN 300 MG: 300 CAPSULE ORAL at 09:40

## 2023-01-27 RX ADMIN — IPRATROPIUM BROMIDE 0.5 MG: 0.5 SOLUTION RESPIRATORY (INHALATION) at 06:48

## 2023-01-27 RX ADMIN — Medication 10 MG: at 01:54

## 2023-01-27 RX ADMIN — Medication 10 ML: at 09:40

## 2023-01-27 RX ADMIN — TRIAMCINOLONE ACETONIDE 1 APPLICATION: 1 OINTMENT TOPICAL at 09:41

## 2023-01-27 RX ADMIN — PANTOPRAZOLE SODIUM 40 MG: 40 TABLET, DELAYED RELEASE ORAL at 09:40

## 2023-01-27 NOTE — TELEPHONE ENCOUNTER
Patient was recently in the hospital from 1/24-1/27/23 and states she got this cream while there and now she can't find it.  She is asking for a prescription to be sent to the pharmacy.

## 2023-01-27 NOTE — TELEPHONE ENCOUNTER
Caller: ManuelDanielle    Relationship: Self    Best call back number: [phone]    What medication are you requesting: KENALOG CREAM    What are your current symptoms: DRY FEET    How long have you been experiencing symptoms: 2-3 WEEKS    Have you had these symptoms before:    [x] Yes  [] No    Have you been treated for these symptoms before:   [x] Yes  [] No    If a prescription is needed, what is your preferred pharmacy and phone number: [facility] [id number]  [place], [place] - [address] AT [facility] [address] - [phone] [facility] [phone] FX     Additional notes: PATIENT CALLED AND STATED THAT SHE WAS PRESCRIBED THIS AT THE HOSPITAL AND SHE CANNOT FIND IT AND WOULD LIKE SOME MORE.

## 2023-01-28 NOTE — OUTREACH NOTE
Prep Survey    Flowsheet Row Responses   [facility] patient discharged from? [facility]   Is LACE score < 7 ? No   Eligibility [facility]   Hospital [facility]   Date of Admission 01/24/23   Date of Discharge 01/27/23   Discharge Disposition Home or Self Care   Discharge diagnosis Hyponatremia   Does the patient have one of the following disease processes/diagnoses(primary or secondary)? Other   Does the patient have Home health ordered? No   Is there a DME ordered? No   Prep survey completed? Yes          JENNIFER IGLESIAS - Registered Nurse

## 2023-01-30 ENCOUNTER — TRANSITIONAL CARE MANAGEMENT TELEPHONE ENCOUNTER (OUTPATIENT)
Dept: CALL CENTER | Facility: HOSPITAL | Age: 63
End: 2023-01-30
Payer: COMMERCIAL

## 2023-01-30 NOTE — OUTREACH NOTE
"Call Center TCM Note    Flowsheet Row Responses   [facility] patient discharged from? [facility]   Does the patient have one of the following disease processes/diagnoses(primary or secondary)? Other   TCM attempt successful? Yes  [No VR]   Call start time 1438   Call end time 1446   Discharge diagnosis Hyponatremia   Comments  2/6/2023  9:30 AM  HOSPITAL FOLLOW UP    Does the patient have an appointment with their PCP within 7 days of discharge? Yes   Psychosocial issues? No   Comments Pt c/o dizziness upon standing, suggested monitoring BP sitting/standing & daily. Denies N/V/D. Urine output is not often, unsure color. Advised to notice color &frequency of urination. States that she is drinking water \"constantly\".   Did the patient receive a copy of their discharge instructions? Yes   Nursing interventions Reviewed instructions with patient   What is the patient's perception of their health status since discharge? Same   Is the patient/caregiver able to teach back signs and symptoms related to disease process for when to call PCP? Yes   Is the patient/caregiver able to teach back signs and symptoms related to disease process for when to call 911? Yes   Is the patient/caregiver able to teach back the hierarchy of who to call/visit for symptoms/problems? PCP, Specialist, Home health nurse, Urgent Care, ED, 911 Yes   If the patient is a current smoker, are they able to teach back resources for cessation? --  [smokes 6cig/day]   TCM call completed? Yes   Call end time 1446   Would this patient benefit from a Referral to Amb Social Work? No   Is the patient interested in additional calls from an ambulatory ?  NOTE:  applies to high risk patients requiring additional follow-up. No          Coreen Arce RN    1/30/2023, 14:46 EST      "

## 2023-02-02 NOTE — TELEPHONE ENCOUNTER
Med refill, this was d/c by a different provider.  Patient last seen 1/3/23, hospital stay 1/24/23-1/27/23.  Patient has appt w you coming up  On 2/6/23.

## 2023-02-03 RX ORDER — CHLORTHALIDONE 25 MG/1
TABLET ORAL
Qty: 30 TABLET | Refills: 0 | Status: SHIPPED | OUTPATIENT
Start: 2023-02-03 | End: 2023-02-06

## 2023-02-06 ENCOUNTER — LAB (OUTPATIENT)
Dept: LAB | Facility: HOSPITAL | Age: 63
End: 2023-02-06
Payer: COMMERCIAL

## 2023-02-06 ENCOUNTER — OFFICE VISIT (OUTPATIENT)
Dept: FAMILY MEDICINE CLINIC | Facility: CLINIC | Age: 63
End: 2023-02-06
Payer: COMMERCIAL

## 2023-02-06 VITALS
RESPIRATION RATE: 18 BRPM | WEIGHT: 134.4 LBS | OXYGEN SATURATION: 98 % | SYSTOLIC BLOOD PRESSURE: 112 MMHG | HEIGHT: 66 IN | DIASTOLIC BLOOD PRESSURE: 75 MMHG | TEMPERATURE: 98.6 F | HEART RATE: 71 BPM | BODY MASS INDEX: 21.6 KG/M2

## 2023-02-06 DIAGNOSIS — N13.30 HYDRONEPHROSIS OF RIGHT KIDNEY: ICD-10-CM

## 2023-02-06 DIAGNOSIS — F17.211 CIGARETTE NICOTINE DEPENDENCE IN REMISSION: ICD-10-CM

## 2023-02-06 DIAGNOSIS — E87.6 HYPOKALEMIA: ICD-10-CM

## 2023-02-06 DIAGNOSIS — R73.01 IMPAIRED FASTING GLUCOSE: ICD-10-CM

## 2023-02-06 DIAGNOSIS — M79.672 BILATERAL FOOT PAIN: ICD-10-CM

## 2023-02-06 DIAGNOSIS — E87.1 HYPONATREMIA: ICD-10-CM

## 2023-02-06 DIAGNOSIS — E78.2 MIXED HYPERLIPIDEMIA: ICD-10-CM

## 2023-02-06 DIAGNOSIS — R13.19 ESOPHAGEAL DYSPHAGIA: ICD-10-CM

## 2023-02-06 DIAGNOSIS — F41.9 ANXIETY DISORDER, UNSPECIFIED TYPE: ICD-10-CM

## 2023-02-06 DIAGNOSIS — F51.01 PRIMARY INSOMNIA: ICD-10-CM

## 2023-02-06 DIAGNOSIS — Z79.899 LONG TERM CURRENT USE OF IMMUNOSUPPRESSIVE DRUG: ICD-10-CM

## 2023-02-06 DIAGNOSIS — M32.9 LUPUS: Chronic | ICD-10-CM

## 2023-02-06 DIAGNOSIS — E87.1 HYPONATREMIA: Primary | ICD-10-CM

## 2023-02-06 DIAGNOSIS — Z79.899 IMMUNOSUPPRESSION DUE TO DRUG THERAPY: ICD-10-CM

## 2023-02-06 DIAGNOSIS — M32.19 LUPUS VASCULITIS: Chronic | ICD-10-CM

## 2023-02-06 DIAGNOSIS — D84.821 IMMUNOSUPPRESSION DUE TO DRUG THERAPY: ICD-10-CM

## 2023-02-06 DIAGNOSIS — E86.9 VOLUME DEPLETION, GASTROINTESTINAL LOSS: ICD-10-CM

## 2023-02-06 DIAGNOSIS — M79.89 SWELLING OF BOTH LOWER EXTREMITIES: ICD-10-CM

## 2023-02-06 DIAGNOSIS — I77.89 LUPUS VASCULITIS: Chronic | ICD-10-CM

## 2023-02-06 DIAGNOSIS — R60.9 SWELLING: ICD-10-CM

## 2023-02-06 DIAGNOSIS — R63.5 WEIGHT GAIN: ICD-10-CM

## 2023-02-06 DIAGNOSIS — M79.671 BILATERAL FOOT PAIN: ICD-10-CM

## 2023-02-06 DIAGNOSIS — F41.9 ANXIETY DISORDER, UNSPECIFIED TYPE: Chronic | ICD-10-CM

## 2023-02-06 DIAGNOSIS — G89.4 CHRONIC PAIN SYNDROME: ICD-10-CM

## 2023-02-06 DIAGNOSIS — E55.9 VITAMIN D DEFICIENCY: ICD-10-CM

## 2023-02-06 DIAGNOSIS — G89.4 CHRONIC PAIN DISORDER: Chronic | ICD-10-CM

## 2023-02-06 PROBLEM — M81.0 AGE-RELATED OSTEOPOROSIS WITHOUT CURRENT PATHOLOGICAL FRACTURE: Status: ACTIVE | Noted: 2021-08-04

## 2023-02-06 PROBLEM — K21.9 GASTROESOPHAGEAL REFLUX DISEASE WITHOUT ESOPHAGITIS: Status: ACTIVE | Noted: 2023-01-22

## 2023-02-06 PROBLEM — J43.9 EMPHYSEMA, UNSPECIFIED: Status: ACTIVE | Noted: 2022-01-21

## 2023-02-06 LAB
BASOPHILS # BLD AUTO: 0.04 10*3/MM3 (ref 0–0.2)
BASOPHILS NFR BLD AUTO: 0.9 % (ref 0–1.5)
DEPRECATED RDW RBC AUTO: 42.9 FL (ref 37–54)
EOSINOPHIL # BLD AUTO: 0 10*3/MM3 (ref 0–0.4)
EOSINOPHIL NFR BLD AUTO: 0 % (ref 0.3–6.2)
ERYTHROCYTE [DISTWIDTH] IN BLOOD BY AUTOMATED COUNT: 12.4 % (ref 12.3–15.4)
FOLATE SERPL-MCNC: >20 NG/ML (ref 4.78–24.2)
HBA1C MFR BLD: 5.5 % (ref 4.8–5.6)
HCT VFR BLD AUTO: 41.4 % (ref 34–46.6)
HGB BLD-MCNC: 13.7 G/DL (ref 12–15.9)
IMM GRANULOCYTES # BLD AUTO: 0.02 10*3/MM3 (ref 0–0.05)
IMM GRANULOCYTES NFR BLD AUTO: 0.4 % (ref 0–0.5)
LYMPHOCYTES # BLD AUTO: 0.78 10*3/MM3 (ref 0.7–3.1)
LYMPHOCYTES NFR BLD AUTO: 16.6 % (ref 19.6–45.3)
MCH RBC QN AUTO: 31.1 PG (ref 26.6–33)
MCHC RBC AUTO-ENTMCNC: 33.1 G/DL (ref 31.5–35.7)
MCV RBC AUTO: 93.9 FL (ref 79–97)
MONOCYTES # BLD AUTO: 0.46 10*3/MM3 (ref 0.1–0.9)
MONOCYTES NFR BLD AUTO: 9.8 % (ref 5–12)
NEUTROPHILS NFR BLD AUTO: 3.39 10*3/MM3 (ref 1.7–7)
NEUTROPHILS NFR BLD AUTO: 72.3 % (ref 42.7–76)
NRBC BLD AUTO-RTO: 0 /100 WBC (ref 0–0.2)
PLATELET # BLD AUTO: 237 10*3/MM3 (ref 140–450)
PMV BLD AUTO: 11 FL (ref 6–12)
RBC # BLD AUTO: 4.41 10*6/MM3 (ref 3.77–5.28)
VIT B12 BLD-MCNC: 763 PG/ML (ref 211–946)
WBC NRBC COR # BLD: 4.69 10*3/MM3 (ref 3.4–10.8)

## 2023-02-06 PROCEDURE — 83036 HEMOGLOBIN GLYCOSYLATED A1C: CPT

## 2023-02-06 PROCEDURE — 83735 ASSAY OF MAGNESIUM: CPT

## 2023-02-06 PROCEDURE — 84439 ASSAY OF FREE THYROXINE: CPT

## 2023-02-06 PROCEDURE — 85025 COMPLETE CBC W/AUTO DIFF WBC: CPT

## 2023-02-06 PROCEDURE — 80069 RENAL FUNCTION PANEL: CPT

## 2023-02-06 PROCEDURE — 99214 OFFICE O/P EST MOD 30 MIN: CPT | Performed by: FAMILY MEDICINE

## 2023-02-06 PROCEDURE — 83880 ASSAY OF NATRIURETIC PEPTIDE: CPT | Performed by: FAMILY MEDICINE

## 2023-02-06 PROCEDURE — 82746 ASSAY OF FOLIC ACID SERUM: CPT

## 2023-02-06 PROCEDURE — 82043 UR ALBUMIN QUANTITATIVE: CPT

## 2023-02-06 PROCEDURE — 80061 LIPID PANEL: CPT

## 2023-02-06 PROCEDURE — 36415 COLL VENOUS BLD VENIPUNCTURE: CPT

## 2023-02-06 PROCEDURE — 84443 ASSAY THYROID STIM HORMONE: CPT

## 2023-02-06 PROCEDURE — 82607 VITAMIN B-12: CPT

## 2023-02-06 RX ORDER — POLYETHYLENE GLYCOL 3350 17 G
2 POWDER IN PACKET (EA) ORAL AS NEEDED
Qty: 108 EACH | Refills: 5 | Status: SHIPPED | OUTPATIENT
Start: 2023-02-06

## 2023-02-06 RX ORDER — NICOTINE 21 MG/24HR
1 PATCH, TRANSDERMAL 24 HOURS TRANSDERMAL EVERY 24 HOURS
Qty: 28 PATCH | Refills: 0 | Status: SHIPPED | OUTPATIENT
Start: 2023-02-06

## 2023-02-06 NOTE — PROGRESS NOTES
Transitional Care Follow Up Visit  Subjective     Danielle Manuel is a 62 y.o. female who presents for a transitional care management visit.    Within 48 business hours after discharge our office contacted her via telephone to coordinate her care and needs.      I reviewed and discussed the details of that call along with the discharge summary, hospital problems, inpatient lab results, inpatient diagnostic studies, and consultation reports with Danielle.     Current outpatient and discharge medications have been reconciled for the patient.  Reviewed by: Teofilo Ordonez DO      Date of TCM Phone Call 1/27/2023   [facility]   Date of Admission 1/24/2023   Date of Discharge 1/27/2023   Discharge Disposition Home or Self Care     Risk for Readmission (LACE) Score: 11 (1/27/2023  6:01 AM)      History of Present Illness     DANIELLE MANUEL is a 62-year-old female who presents today for follow-up from hospital admission from 01/27/2023 to 01/30/2023 for hyponatremia, hypokalemia, volume depletion, and hydronephrosis. She had a sodium level which was 117 mmol/L after starting chlorthalidone for a few weeks for edema and hypertension. When last seen in the clinic, her potassium also was significantly low at 2.6 mmol/L and she had a lot of volume depletion with nausea, vomiting, and diarrhea, which exacerbated these deficiencies. She was inpatient treated and managed by nephrology. Urology recommended medication changes, adjustments, and a follow-up. Her potassium and sodium significantly improved within the first day and continued improving until discharge. Her sodium it was in the 130s range by then. She continues to have low blood pressures. Given her lupus, she is advised to follow-up with nephrology and other specialist. We will recheck kidney function today. She is still experiencing intermittent dizziness. She is drinking water constantly.    The patient acknowledges she had a stomach virus which exacerbated her  symptoms. She reports she has lost weight and feels drained.     She states she monitors her blood pressure at home. She notes she discontinued taking her blood pressure medication. She reports her blood pressure this morning was 108/76 mmHg.    She acknowledges she was given Kenalog while in the hospital. She acknowledges it is helping. She denies seeing a vascular specialist. She acknowledges her feet are about the same. She states the cream is helping as far as the skin; however, she is worried about not having feeling. She acknowledges she is unable to ambulate secondary to pain. She acknowledges she was seen by a wound nurse while in the hospital. She reports her feet pain is worse now than it has ever been. She inquires if she should wear shoes while at home.    She acknowledges she is seeing a urologist. She acknowledges she was seen for her right kidney.     She acknowledges she is not smoking as much; however, she would like to quit. She inquires if she can be prescribed nicotine patches.    Course During Hospital Stay:    Admit date: 1/24/2023     Discharge date: 1/27/2023      Admitting Physician: Mitchel Barnnon MD      Discharge Physician: Jensen Kramer MD      Admission Diagnoses: Hypokalemia [E87.6]  Hyponatremia [E87.1]     Hospital Problems:   Principal Problem:  Hyponatremia   Active Problems:        Problems Addressed this Visit                   Genitourinary and Reproductive      * (Principal) Hyponatremia - Primary         Other Visit Diagnoses            Hypokalemia         Decreased activities of daily living (ADL)         Difficulty walking                The following portions of the patient's history were reviewed and updated as appropriate: allergies, current medications, past family history, past medical history, past social history, past surgical history and problem list.    /75 (BP Location: Left arm, Patient Position: Sitting)   Pulse 71   Temp 98.6 °F (37 °C)   " Resp 18   Ht 167.6 cm (66\")   Wt 61 kg (134 lb 6.4 oz)   SpO2 98%   Breastfeeding No   BMI 21.69 kg/m²      Blood Pressures during visit    02/06/23 1021   BP: 112/75       Objective   Physical Exam  Vitals reviewed.   Constitutional:       Appearance: Normal appearance. She is well-developed.   HENT:      Head: Normocephalic and atraumatic.      Right Ear: External ear normal.      Left Ear: External ear normal.      Nose: Nose normal.   Eyes:      Conjunctiva/sclera: Conjunctivae normal.      Pupils: Pupils are equal, round, and reactive to light.   Cardiovascular:      Rate and Rhythm: Normal rate.   Pulmonary:      Effort: Pulmonary effort is normal.      Breath sounds: Normal breath sounds.   Abdominal:      General: There is no distension.   Skin:     Comments: Significant skin lesions chronic, worse on feet bilaterally secondary to lupus   Neurological:      Mental Status: She is alert and oriented to person, place, and time. Mental status is at baseline.      Motor: Weakness present.   Psychiatric:         Mood and Affect: Mood and affect normal.         Behavior: Behavior normal.         Thought Content: Thought content normal.         Judgment: Judgment normal.           Lab Frequency Next Occurrence   Follow Anesthesia Guidelines / Protocol Once 04/05/2022   Obtain Informed Consent Once 04/10/2022   Mammo Screening Digital Tomosynthesis Bilateral With CAD Once 05/21/2022   TSH+Free T4 Once 01/08/2023   Hemoglobin A1c Once 01/08/2023   CBC (No Diff) Once 01/08/2023   Vitamin B12 Once 01/08/2023   Folate Once 01/08/2023   Lipid panel Once 01/08/2023   MicroAlbumin, Urine, Random - Urine, Clean Catch Once 01/03/2023   Magnesium Once 02/11/2023   CBC w AUTO Differential Once 02/11/2023   Renal Function Panel Once 02/11/2023   Comprehensive Metabolic Panel Every 6 Months 3/31/2023, 9/29/2023, 3/29/2024, 9/27/2024, 3/28/2025, 9/26/2025, 3/27/2026, 9/25/2026, 3/26/2027   CBC & Differential Every 6 Months " 3/31/2023, 9/29/2023, 3/29/2024, 9/27/2024, 3/28/2025, 9/26/2025, 3/27/2026, 9/25/2026, 3/26/2027   TSH Every 6 Months 3/31/2023, 9/29/2023, 3/29/2024, 9/27/2024, 3/28/2025, 9/26/2025, 3/27/2026, 9/25/2026, 3/26/2027   Lipid Panel Every 6 Months 3/31/2023, 9/29/2023, 3/29/2024, 9/27/2024, 3/28/2025, 9/26/2025, 3/27/2026, 9/25/2026, 3/26/2027   C4+C3 Every 6 Months 3/31/2023, 9/29/2023, 3/29/2024, 9/27/2024, 3/28/2025, 9/26/2025, 3/27/2026, 9/25/2026, 3/26/2027   dsDNA Antibody by IFA, Hollie moon, with Reflex to Titer Every 6 Months 3/31/2023, 9/29/2023, 3/29/2024, 9/27/2024, 3/28/2025, 9/26/2025, 3/27/2026, 9/25/2026, 3/26/2027   Urinalysis With Microscopic - Urine, Clean Catch Every 6 Months 3/31/2023, 9/29/2023, 3/29/2024, 9/27/2024, 3/28/2025, 9/26/2025, 3/27/2026, 9/25/2026, 3/26/2027   Sedimentation rate, automated Every 6 Months 3/31/2023, 9/29/2023, 3/29/2024, 9/27/2024, 3/28/2025, 9/26/2025, 3/27/2026, 9/25/2026, 3/26/2027   C-reactive protein Every 6 Months 3/31/2023, 9/29/2023, 3/29/2024, 9/27/2024, 3/28/2025, 9/26/2025, 3/27/2026, 9/25/2026, 3/26/2027   TSH+Free T4 Every 6 Months 3/31/2023, 9/29/2023, 3/29/2024, 9/27/2024, 3/28/2025, 9/26/2025, 3/27/2026, 9/25/2026, 3/26/2027   PTH, Intact Every 6 Months 3/31/2023, 9/29/2023, 3/29/2024, 9/27/2024, 3/28/2025, 9/26/2025, 3/27/2026, 9/25/2026, 3/26/2027   Vitamin D 25 hydroxy Every 6 Months 3/31/2023, 9/29/2023, 3/29/2024, 9/27/2024, 3/28/2025, 9/26/2025, 3/27/2026, 9/25/2026, 3/26/2027                Assessment & Plan   Problems Addressed this Visit        Active Problems    Anxiety disorder (Chronic)    Chronic pain disorder (Chronic)    Lupus vasculitis (HCC)    Hyponatremia - Primary    Relevant Orders    Magnesium    CBC w AUTO Differential    Renal Function Panel    Hydronephrosis of right kidney   Other Visit Diagnoses     Volume depletion, gastrointestinal loss        Relevant Orders    Magnesium    CBC w AUTO Differential    Renal Function  Panel    Hypokalemia        Relevant Orders    Magnesium    CBC w AUTO Differential    Renal Function Panel      Diagnoses       Codes Comments    Hyponatremia    -  Primary ICD-10-CM: E87.1  ICD-9-CM: 276.1     Volume depletion, gastrointestinal loss     ICD-10-CM: E86.9  ICD-9-CM: 276.50     Hypokalemia     ICD-10-CM: E87.6  ICD-9-CM: 276.8     Anxiety disorder, unspecified type     ICD-10-CM: F41.9  ICD-9-CM: 300.00     Chronic pain disorder     ICD-10-CM: G89.4  ICD-9-CM: 338.4     Lupus vasculitis (HCC)     ICD-10-CM: M32.19, I77.89  ICD-9-CM: 710.0, 447.8     Hydronephrosis of right kidney     ICD-10-CM: N13.30  ICD-9-CM: 591         1. Hyponatremia and hypokalemia.  - Recheck labs today.   - Continue fluid and electrolyte replacement at home.   - Monitor blood pressure.    2. Hydronephrosis.  - Follow up with urology.    3. Chronic lupus and vasculitis.  - Follow up with vascular specialist and wound care for feet sores.   - Advised to only apply the steroid cream topical for a couple of weeks.   - We will follow-up patient quickly in a couple of weeks, if not sooner if worsening signs or symptoms.  - Concern for worsening cyanosis and vascular issues of feet, as well as neuropathy. Close follow-up.           Follow Up   Return in about 2 weeks (around 2/20/2023), or if symptoms worsen or fail to improve, for Recheck, BP & Log, Weight check.      Additional Instructions for the Follow-ups that You Need to Schedule     CBC w AUTO Differential    Feb 11, 2023 (Approximate)      Manual Differential: No         Magnesium    Feb 11, 2023 (Approximate)      Release to patient: Routine Release         Renal Function Panel    Feb 11, 2023 (Approximate)      Release to patient: Routine Release              Your Scheduled Appointments    Feb 20, 2023  1:30 PM  New Patient with Nereyda Powers MD  [facility] ([facility]) [address]  [place] [address]  [phone]   Arrive 15 minutes early   and bring any hard copy CD imaging (reports for CT, MRI, etc.) perfomed at at non-[facility] facility, photo ID, insurance card, list of medications and copay (this will vary by insurance plan).           Transcribed from ambient dictation for Teofilo Ordonez DO by Josiah Cordova.  02/06/23   13:12 EST    Patient or patient representative verbalized consent to the visit recording.  I have personally performed the services described in this document as transcribed by the above individual, and it is both accurate and complete.

## 2023-02-07 ENCOUNTER — READMISSION MANAGEMENT (OUTPATIENT)
Dept: CALL CENTER | Facility: HOSPITAL | Age: 63
End: 2023-02-07
Payer: COMMERCIAL

## 2023-02-07 LAB
ALBUMIN SERPL-MCNC: 4.3 G/DL (ref 3.5–5.2)
ALBUMIN UR-MCNC: <1.2 MG/DL
ANION GAP SERPL CALCULATED.3IONS-SCNC: 7 MMOL/L (ref 5–15)
BUN SERPL-MCNC: 7 MG/DL (ref 8–23)
BUN/CREAT SERPL: 8.8 (ref 7–25)
CALCIUM SPEC-SCNC: 9.7 MG/DL (ref 8.6–10.5)
CHLORIDE SERPL-SCNC: 100 MMOL/L (ref 98–107)
CHOLEST SERPL-MCNC: 222 MG/DL (ref 0–200)
CO2 SERPL-SCNC: 27 MMOL/L (ref 22–29)
CREAT SERPL-MCNC: 0.8 MG/DL (ref 0.57–1)
EGFRCR SERPLBLD CKD-EPI 2021: 83.9 ML/MIN/1.73
GLUCOSE SERPL-MCNC: 86 MG/DL (ref 65–99)
HDLC SERPL-MCNC: 85 MG/DL (ref 40–60)
LDLC SERPL CALC-MCNC: 119 MG/DL (ref 0–100)
LDLC/HDLC SERPL: 1.37 {RATIO}
MAGNESIUM SERPL-MCNC: 2 MG/DL (ref 1.6–2.4)
NT-PROBNP SERPL-MCNC: 171 PG/ML (ref 0–900)
PHOSPHATE SERPL-MCNC: 4.8 MG/DL (ref 2.5–4.5)
POTASSIUM SERPL-SCNC: 4.6 MMOL/L (ref 3.5–5.2)
SODIUM SERPL-SCNC: 134 MMOL/L (ref 136–145)
T4 FREE SERPL-MCNC: 1.2 NG/DL (ref 0.93–1.7)
TRIGL SERPL-MCNC: 102 MG/DL (ref 0–150)
TSH SERPL DL<=0.05 MIU/L-ACNC: 0.86 UIU/ML (ref 0.27–4.2)
VLDLC SERPL-MCNC: 18 MG/DL (ref 5–40)

## 2023-02-07 NOTE — OUTREACH NOTE
Medical Week 2 Survey    Flowsheet Row Responses   [facility] patient discharged from? [facility]   Does the patient have one of the following disease processes/diagnoses(primary or secondary)? Other   Week 2 attempt successful? Yes   Call start time 1525   Discharge diagnosis Hyponatremia   Call end time 1540   Meds reviewed with patient/caregiver? Yes   Is the patient taking all medications as directed (includes completed medication regime)? Yes   Does the patient have a primary care provider?  Yes   Has the patient kept scheduled appointments due by today? Yes   Psychosocial issues? No   Comments Pt c/o sm amt of nausea. Pt reports her urine output remains decreased, cloudy &has odor, PCP sending her to Urology & doing a UA. She reports abd girth is increasing. Suggested that she monitor morning wts to    What is the patient's perception of their health status since discharge? Same   Is the patient/caregiver able to teach back signs and symptoms related to disease process for when to call PCP? Yes   Additional teach back comments Instructed about monitoring morning wts & abd girth measures as she feels her abdomen is growing in size   Week 2 Call Completed? Yes   Is the patient interested in additional calls from an ambulatory ?  NOTE:  applies to high risk patients requiring additional follow-up. No          Coreen GARCIA - Registered Nurse

## 2023-02-08 ENCOUNTER — TELEPHONE (OUTPATIENT)
Dept: FAMILY MEDICINE CLINIC | Facility: CLINIC | Age: 63
End: 2023-02-08
Payer: COMMERCIAL

## 2023-02-08 NOTE — TELEPHONE ENCOUNTER
Pharmacy needing max amount of nicotine lozenges patient can have in 1 day.  Per Dr. Ordonez, max is 12 per day. I called pharmacy back and spoke with Elin.

## 2023-02-12 PROBLEM — G57.93 NEUROPATHY OF BOTH FEET: Status: ACTIVE | Noted: 2023-02-12

## 2023-02-13 ENCOUNTER — OFFICE VISIT (OUTPATIENT)
Dept: WOUND CARE | Facility: HOSPITAL | Age: 63
End: 2023-02-13
Payer: COMMERCIAL

## 2023-02-13 VITALS
RESPIRATION RATE: 16 BRPM | HEART RATE: 69 BPM | DIASTOLIC BLOOD PRESSURE: 80 MMHG | TEMPERATURE: 97.5 F | SYSTOLIC BLOOD PRESSURE: 140 MMHG

## 2023-02-13 DIAGNOSIS — R23.4 SKIN DESQUAMATION: ICD-10-CM

## 2023-02-13 DIAGNOSIS — M32.9 SYSTEMIC LUPUS ERYTHEMATOSUS, UNSPECIFIED SLE TYPE, UNSPECIFIED ORGAN INVOLVEMENT STATUS: ICD-10-CM

## 2023-02-13 DIAGNOSIS — I77.89 LUPUS VASCULITIS: Primary | ICD-10-CM

## 2023-02-13 DIAGNOSIS — M32.19 LUPUS VASCULITIS: Primary | ICD-10-CM

## 2023-02-13 PROCEDURE — 99204 OFFICE O/P NEW MOD 45 MIN: CPT | Performed by: EMERGENCY MEDICINE

## 2023-02-13 PROCEDURE — G0463 HOSPITAL OUTPT CLINIC VISIT: HCPCS | Performed by: EMERGENCY MEDICINE

## 2023-02-13 RX ORDER — CLOBETASOL PROPIONATE 0.5 MG/G
1 OINTMENT TOPICAL 2 TIMES DAILY
Qty: 60 G | Refills: 0 | Status: SHIPPED | OUTPATIENT
Start: 2023-02-13

## 2023-02-13 NOTE — PROGRESS NOTES
Chief Complaint  Wound Check (BILATERAL FOOT ULCERS FOR 3-4 MONTHS. WORSE IN THE LAST MONTH.)    Subjective      History of Present Illness    Danielle Manuel  is a 62 y.o. female with multiple medical problems including lupus vasculitis who was referred here by her PCP.  She has had this problem for a long time and has been seen by dermatology but not rheumatology.  Patient is currently off of her lupus medications as her PCP informed her it could be causing worsening hypertension and advised her to stop.  As such, her pain and skin issues have worsened considerably.  She says she did well with clobetasol ointment that was given to her after a hospitalization at the end of January 2023, but Dr. Ordonez told her this was just causing the outer layer of skin to peel off and not actually helping her underlying problem and advised her to stop so she did.    She has not seen Dr. Naik, her dermatologist, since the fall, and he is the one who has been prescribing the medications for her.  She says at her last visit she was doing well so no changes to her medications were made.  She has never seen a rheumatologist.  Her feet and hands become very painful and currently her feet are extremely sensitive and painful.  She wears slide type sandals because other type shoes hurt but since it is winter this causes her feet to get extremely cold which results in even more pain.  She is not sure what to do about this problem.      Allergies:  Penicillins and Cyclobenzaprine      Current Outpatient Medications:   •  alendronate (FOSAMAX) 70 MG tablet, Take 1 tablet by mouth Every 7 (Seven) Days for 90 days., Disp: 12 tablet, Rfl: 3  •  busPIRone (BUSPAR) 5 MG tablet, TAKE ONE TABLET BY MOUTH THREE TIMES A DAY (Patient taking differently: Take 5 mg by mouth 3 (Three) Times a Day As Needed.), Disp: 90 tablet, Rfl: 3  •  calcium carbonate (OS-PATTIE) 1250 (500 Ca) MG tablet, Take 1 tablet by mouth Daily As Needed for Indigestion or  Heartburn., Disp: , Rfl:   •  chlorhexidine (PERIDEX) 0.12 % solution, Apply 15 mL to the mouth or throat 2 (Two) Times a Day As Needed., Disp: , Rfl:   •  clobetasol (TEMOVATE) 0.05 % ointment, Apply 1 application topically to the appropriate area as directed 2 (Two) Times a Day., Disp: 60 g, Rfl: 0  •  clonazePAM (KlonoPIN) 1 MG tablet, Take 1 tablet by mouth At Night As Needed for Anxiety., Disp: 30 tablet, Rfl: 5  •  desonide (DESOWEN) 0.05 % ointment, Apply 1 application topically to the appropriate area as directed 3 (Three) Times a Day As Needed., Disp: , Rfl:   •  gabapentin (NEURONTIN) 300 MG capsule, Take 1 capsule by mouth 3 (Three) Times a Day., Disp: 90 capsule, Rfl: 1  •  mupirocin (BACTROBAN) 2 % ointment, Apply 1 application topically to the appropriate area as directed 3 (Three) Times a Day., Disp: , Rfl:   •  nicotine (Nicoderm CQ) 14 MG/24HR patch, Place 1 patch on the skin as directed by provider Daily., Disp: 28 patch, Rfl: 0  •  nicotine polacrilex (COMMIT) 2 MG lozenge, Dissolve 1 lozenge in the mouth As Needed for Smoking Cessation., Disp: 108 each, Rfl: 5  •  Omega-3 Fatty Acids (fish oil) 1000 MG capsule capsule, Take 1,000 mg by mouth Daily With Breakfast., Disp: , Rfl:   •  ondansetron ODT (ZOFRAN-ODT) 4 MG disintegrating tablet, Place 4 mg on the tongue Every 12 (Twelve) Hours As Needed for Nausea or Vomiting., Disp: , Rfl:   •  pantoprazole (PROTONIX) 40 MG EC tablet, Take 1 tablet by mouth Daily. for 10 days. (Patient taking differently: Take 40 mg by mouth Daily As Needed.), Disp: 90 tablet, Rfl: 3  •  tiotropium (Spiriva HandiHaler) 18 MCG per inhalation capsule, Place 1 capsule into inhaler and inhale Daily., Disp: 28 capsule, Rfl: 11  •  traMADol (ULTRAM) 50 MG tablet, Take 1 tablet by mouth Daily As Needed for Moderate Pain ., Disp: 30 tablet, Rfl: 2  •  triamcinolone (KENALOG) 0.1 % ointment, Apply a thin layer to bilateral feet every 12 hours., Disp: 80 g, Rfl: 0  •  vitamin D  (ERGOCALCIFEROL) 1.25 MG (93591 UT) capsule capsule, Take 50,000 Units by mouth Every 7 (Seven) Days., Disp: , Rfl:     Past Medical History:   Diagnosis Date   • Acid reflux    • Anxiety disorder 05/05/2021   • Arthritis 09/01/2021   • Bunion 1990   • Cancer ([facility]) 01/01/2018    skin   • Chronic pain 05/05/2021   • Claustrophobia 01/01/1990   • COPD (chronic obstructive pulmonary disease) ([facility]) 10/01/2021   • CTS (carpal tunnel syndrome) 01/01/1990   • Depression 01/2011   • Difficulty walking 2022   • Headache 09/072021   • Hyperlipemia    • Hyperlipidemia 05/05/2021   • Ingrown toenail 1977   • Insomnia, unspecified 05/05/2021   • Low back pain 02/01/2021   • Lupus ([facility])    • MRSA (methicillin resistant Staphylococcus aureus) 06/01/2021   • Neuropathy of both feet 2/12/2023   • Osteopenia 01/01/2010   • Post menopausal syndrome 05/05/2021   • Thoracic disc disorder 02/01/2021   • Vitamin D deficiency 05/05/2021     Past Surgical History:   Procedure Laterality Date   • APPENDECTOMY     • CARPAL TUNNEL RELEASE  03/01/1990   • COLONOSCOPY  cologuard   • ENDOSCOPY N/A 08/08/2022    Procedure: ESOPHAGOGASTRODUODENOSCOPY;  Surgeon: Harmony Thacker MD;  Location: [facility] ENDOSCOPY;  Service: Gastroenterology;  Laterality: N/A;  NORMAL EGD   • HYSTERECTOMY     • TONSILECTOMY, ADENOIDECTOMY, BILATERAL MYRINGOTOMY AND TUBES     • TONSILLECTOMY     • TUBAL ABDOMINAL LIGATION       Social History     Socioeconomic History   • Marital status:    Tobacco Use   • Smoking status: Some Days     Packs/day: 0.50     Years: 35.00     Pack years: 17.50     Types: Cigarettes     Start date: 1/1/1986     Passive exposure: Past   • Smokeless tobacco: Never   Vaping Use   • Vaping Use: Never used   Substance and Sexual Activity   • Alcohol use: Not Currently     Alcohol/week: 2.0 standard drinks     Types: 2 Cans of beer per week     Comment: CURRENT SOME DAY, DRINKS LESS THAN 1 DRINK PER DAY, HAS BEEN DRINKING FOR 21-30  YEARS    • Drug use: Never   • Sexual activity: Not Currently     Partners: Male     Birth control/protection: None           Objective     Vitals:    02/13/23 0913   BP: 140/80   BP Location: Left arm   Patient Position: Sitting   Pulse: 69   Resp: 16   Temp: 97.5 °F (36.4 °C)   TempSrc: Temporal   PainSc: 10-Worst pain ever   PainLoc: Foot  Comment: BILATERAL FOOT PAIN     There is no height or weight on file to calculate BMI.    STEADI Fall Risk Assessment has not been completed.     Review of Systems     ROS:  No fevers, chills, sweats, or body aches.     I have reviewed the HPI and ROS as documented by MA/RN. Amairani Stark MD    Physical Exam     NAD  Normocephalic, atraumatic  EOMI, no scleral icterus  No respiratory distress, no cough  AAOx3, pleasant, cooperative  Mild dry skin bilateral hands  2+ palpable DP and PT pulses BLE, no edema BLE  Severe desquamation plantar surface of both feet with combination of dry peeling skin and underlying maceration.  Purple discoloration and cool toes distally with good capillary refill.  No open wounds.    See photos for details.                          Result Review :  The following data was reviewed by: Amairani Stark MD on 02/13/2023:    PCP note, CBC, renal function, lipids, hemoglobin A1c 5.5             Assessment and Plan   Diagnoses and all orders for this visit:    1. Lupus vasculitis (HCC) (Primary)  -     Ambulatory Referral to Rheumatology    2. Systemic lupus erythematosus, unspecified SLE type, unspecified organ involvement status (HCC)  -     Ambulatory Referral to Rheumatology  -     clobetasol (TEMOVATE) 0.05 % ointment; Apply 1 application topically to the appropriate area as directed 2 (Two) Times a Day.  Dispense: 60 g; Refill: 0    3. Skin desquamation  -     clobetasol (TEMOVATE) 0.05 % ointment; Apply 1 application topically to the appropriate area as directed 2 (Two) Times a Day.  Dispense: 60 g; Refill: 0      Patient discussed with   Gumaro, including photos.  I have prescribed clobetasol for her again which she is to use twice a day until Dr. Naik can get her back in to be seen.  He says he will have his office contact her to get her rescheduled.    I have also referred the patient to rheumatology for additional evaluation and treatment.    She can wear a closed toe cushioned or fleece lined slide type shoe or house shoe to be more comfortable for her to wear while still protecting her feet.    Return to wound care as needed.    Patient was given instructions and counseling regarding their condition or for health maintenance advice, as well as the wound care plan and recommendations. They understand and agree with the plan.  They will follow back up here in the clinic but are instructed to contact us in the interim should they have any new, returning, or worsening symptoms or concerns. Please see specific information pulled into the AVS if appropriate.     Dragon Dictation utilized for chart completion.    Follow Up   Return if symptoms worsen or fail to improve.      Amairani Stark MD

## 2023-02-15 ENCOUNTER — READMISSION MANAGEMENT (OUTPATIENT)
Dept: CALL CENTER | Facility: HOSPITAL | Age: 63
End: 2023-02-15
Payer: COMMERCIAL

## 2023-02-15 NOTE — OUTREACH NOTE
Medical Week 3 Survey    Flowsheet Row Responses   [facility] patient discharged from? [facility]   Does the patient have one of the following disease processes/diagnoses(primary or secondary)? Other   Week 3 attempt successful? Yes   Call start time 0945   Call end time 1000   Discharge diagnosis Hyponatremia   Meds reviewed with patient/caregiver? Yes   Is the patient taking all medications as directed (includes completed medication regime)? Yes   Comments regarding appointments 2/20/23 urology apt,  2/22/23 vacular surgery,  rheumatology apt in the future    Does the patient have a primary care provider?  Yes   Comments regarding PCP 2/21/23 PCP apt    Has the patient kept scheduled appointments due by today? Yes   What is the patient's perception of their health status since discharge? Same   Is the patient/caregiver able to teach back signs and symptoms related to disease process for when to call PCP? Yes   Is the patient/caregiver able to teach back signs and symptoms related to disease process for when to call 911? Yes   Is the patient/caregiver able to teach back the hierarchy of who to call/visit for symptoms/problems? PCP, Specialist, Home health nurse, Urgent Care, ED, 911 Yes   Week 3 Call Completed? Yes          Azra  - Registered Nurse

## 2023-02-20 ENCOUNTER — OFFICE VISIT (OUTPATIENT)
Dept: UROLOGY | Facility: CLINIC | Age: 63
End: 2023-02-20
Payer: COMMERCIAL

## 2023-02-20 VITALS — BODY MASS INDEX: 22.43 KG/M2 | HEIGHT: 66 IN | WEIGHT: 139.6 LBS

## 2023-02-20 DIAGNOSIS — N13.30 HYDRONEPHROSIS, UNSPECIFIED HYDRONEPHROSIS TYPE: Primary | ICD-10-CM

## 2023-02-20 LAB
BILIRUB BLD-MCNC: NEGATIVE MG/DL
CLARITY, POC: CLEAR
COLOR UR: YELLOW
EXPIRATION DATE: NORMAL
GLUCOSE UR STRIP-MCNC: NEGATIVE MG/DL
KETONES UR QL: NEGATIVE
LEUKOCYTE EST, POC: NEGATIVE
Lab: NORMAL
NITRITE UR-MCNC: NEGATIVE MG/ML
PH UR: 5.5 [PH] (ref 5–8)
PROT UR STRIP-MCNC: NEGATIVE MG/DL
RBC # UR STRIP: NEGATIVE /UL
SP GR UR: 1 (ref 1–1.03)
UROBILINOGEN UR QL: NORMAL

## 2023-02-20 PROCEDURE — 99213 OFFICE O/P EST LOW 20 MIN: CPT | Performed by: UROLOGY

## 2023-02-20 NOTE — PROGRESS NOTES
"Chief Complaint  Hydronephrosis    Subjective          Danielle Manuel presents to [facility] UROLOGY  History of Present Illness     The patient reports that she is doing well. She states that she has intermittent pain on her right side. She reports that she was told in the hospital that her right kidney is swollen. She states that she has a stabbing pain on her right side.      Objective   Vital Signs:   Ht 167.6 cm (66\")   Wt 63.3 kg (139 lb 9.6 oz)   BMI 22.53 kg/m²       Physical Exam  Vitals and nursing note reviewed.   Constitutional:       Appearance: Normal appearance. She is well-developed.   Pulmonary:      Effort: Pulmonary effort is normal.      Breath sounds: Normal air entry.   Neurological:      Mental Status: She is alert and oriented to person, place, and time.      Motor: Motor function is intact.   Psychiatric:         Mood and Affect: Mood normal.         Behavior: Behavior normal.          Result Review :   The following data was reviewed by: Nereyda Powers MD on 02/20/2023:    Results for orders placed or performed in visit on 02/20/23   POC Urinalysis Dipstick, Automated    Specimen: Urine   Result Value Ref Range    Color Yellow Yellow, Straw, Dark Yellow, Azra    Clarity, UA Clear Clear    Specific Gravity  1.005 1.005 - 1.030    pH, Urine 5.5 5.0 - 8.0    Leukocytes Negative Negative    Nitrite, UA Negative Negative    Protein, POC Negative Negative mg/dL    Glucose, UA Negative Negative mg/dL    Ketones, UA Negative Negative    Urobilinogen, UA 0.2 E.U./dL Normal, 0.2 E.U./dL    Bilirubin Negative Negative    Blood, UA Negative Negative    Lot Number [id number]     Expiration Date 42,024           Study Result    Narrative & Impression   PROCEDURE:  CT ABDOMEN PELVIS WO CONTRAST     COMPARISON: [facility], CT, CT CHEST LOW DOSE CANCER SCREENING WO, 9/22/2021,   16:21.  [facility], CT, CT CHEST W CONTRAST DIAGNOSTIC, " 3/22/2022, 8:27.     INDICATIONS:  UNABLE TO URINATE     PROTOCOL:     Standard imaging protocol performed                 RADIATION:      DLP:312.9 mGy*cm               Automated exposure control was utilized to minimize radiation dose.                cc      TECHNIQUE: Axial images of the abdomen and pelvis without intravenous or oral contrast.      FINDINGS:     ABDOMEN:  The lung bases are clear.  There is a tiny stone in the gallbladder.  There are no adjacent   inflammatory changes.  There is severe right hydronephrosis.  There is mild right renal cortical   atrophy.  No calcifications are identified in the right kidney.  There are a few small left renal   calcifications likely vascular nature.  The unenhanced spleen, liver, pancreas and adrenal glands   are normal.     PELVIS:  The urinary bladder is distended.  No ureteral or urinary bladder calcifications are identified.    The right ureter does not appear to be dilated.  Pelvic phleboliths are present.  The abdominal   aorta has a normal caliber.  Atherosclerotic calcification is present.  No evidence of bowel   obstruction, perforation or abscess.  The appendix is not visualized.  There are no inflammatory   changes adjacent to the base of the cecum.  There is a mild age-indeterminate superior endplate   compression fracture of L2.     IMPRESSION:       1. Severe right hydronephrosis.  Mild right renal cortical atrophy.  Distended urinary bladder.     2. No ureteral or urinary bladder calcifications are identified.     3. Mild age-indeterminate superior endplate compression fracture of L2.       MUSTAPHA MORA MD         Electronically Signed and Approved By: MUSTAPHA MORA MD on 1/24/2023 at 14:15               Assessment and Plan    Diagnoses and all orders for this visit:    1. Hydronephrosis, unspecified hydronephrosis type (Primary)  Comments:  We will get a renal scan with Lasix and follow up with her as an outpatient after her  test.    Orders:  -     POC Urinalysis Dipstick, Automated  -     NM Renal With Flow & Function With Pharmacological Intervention; Future            Follow Up       No follow-ups on file.  Patient was given instructions and counseling regarding her condition or for health maintenance advice. Please see specific information pulled into the AVS if appropriate.     Transcribed from ambient dictation for Nereyda Powers MD by Moni Sawyer.  02/20/23   14:39 EST    Patient or patient representative verbalized consent to the visit recording.  I have personally performed the services described in this document as transcribed by the above individual, and it is both accurate and complete.  Nereyda Powers MD  2/22/2023  17:38 EST

## 2023-02-21 ENCOUNTER — OFFICE VISIT (OUTPATIENT)
Dept: FAMILY MEDICINE CLINIC | Facility: CLINIC | Age: 63
End: 2023-02-21
Payer: COMMERCIAL

## 2023-02-21 VITALS
SYSTOLIC BLOOD PRESSURE: 135 MMHG | TEMPERATURE: 98.6 F | OXYGEN SATURATION: 100 % | HEIGHT: 66 IN | RESPIRATION RATE: 14 BRPM | DIASTOLIC BLOOD PRESSURE: 87 MMHG | HEART RATE: 79 BPM | WEIGHT: 138.2 LBS | BODY MASS INDEX: 22.21 KG/M2

## 2023-02-21 DIAGNOSIS — F41.9 ANXIETY DISORDER, UNSPECIFIED TYPE: Chronic | ICD-10-CM

## 2023-02-21 DIAGNOSIS — I77.89 LUPUS VASCULITIS: Primary | ICD-10-CM

## 2023-02-21 DIAGNOSIS — J43.9 PULMONARY EMPHYSEMA, UNSPECIFIED EMPHYSEMA TYPE: ICD-10-CM

## 2023-02-21 DIAGNOSIS — M32.9 LUPUS: ICD-10-CM

## 2023-02-21 DIAGNOSIS — M32.19 LUPUS VASCULITIS: Primary | ICD-10-CM

## 2023-02-21 DIAGNOSIS — J41.8 MIXED SIMPLE AND MUCOPURULENT CHRONIC BRONCHITIS: ICD-10-CM

## 2023-02-21 PROCEDURE — 99213 OFFICE O/P EST LOW 20 MIN: CPT | Performed by: FAMILY MEDICINE

## 2023-02-21 RX ORDER — CLONAZEPAM 1 MG/1
1 TABLET ORAL NIGHTLY PRN
Qty: 30 TABLET | Refills: 5 | Status: SHIPPED | OUTPATIENT
Start: 2023-02-21

## 2023-02-23 ENCOUNTER — READMISSION MANAGEMENT (OUTPATIENT)
Dept: CALL CENTER | Facility: HOSPITAL | Age: 63
End: 2023-02-23
Payer: COMMERCIAL

## 2023-02-23 NOTE — OUTREACH NOTE
Medical Week 4 Survey    Flowsheet Row Responses   [facility] facility patient discharged from? [facility]   Does the patient have one of the following disease processes/diagnoses(primary or secondary)? Other   Week 4 attempt successful? No          FLORENCE LOPEZ - Registered Nurse

## 2023-03-02 ENCOUNTER — TELEPHONE (OUTPATIENT)
Dept: UROLOGY | Facility: CLINIC | Age: 63
End: 2023-03-02
Payer: COMMERCIAL

## 2023-03-02 NOTE — TELEPHONE ENCOUNTER
LMOM for patient to call the office so we can schedule her f/u appt after her renal scan on 3/13/23. Friday 3/17/23 between 11 and 1.

## 2023-03-09 RX ORDER — CHLORTHALIDONE 25 MG/1
TABLET ORAL
Qty: 30 TABLET | Refills: 5 | Status: SHIPPED | OUTPATIENT
Start: 2023-03-09

## 2023-03-13 ENCOUNTER — HOSPITAL ENCOUNTER (OUTPATIENT)
Dept: NUCLEAR MEDICINE | Facility: HOSPITAL | Age: 63
Discharge: HOME | End: 2023-03-13
Admitting: UROLOGY
Payer: COMMERCIAL

## 2023-03-13 ENCOUNTER — TELEPHONE (OUTPATIENT)
Dept: UROLOGY | Facility: CLINIC | Age: 63
End: 2023-03-13
Payer: COMMERCIAL

## 2023-03-13 VITALS — SYSTOLIC BLOOD PRESSURE: 133 MMHG | DIASTOLIC BLOOD PRESSURE: 88 MMHG

## 2023-03-13 DIAGNOSIS — N13.30 HYDRONEPHROSIS, UNSPECIFIED HYDRONEPHROSIS TYPE: ICD-10-CM

## 2023-03-13 PROCEDURE — A9562 TC99M MERTIATIDE: HCPCS | Performed by: UROLOGY

## 2023-03-13 PROCEDURE — 25010000002 FUROSEMIDE PER 20 MG: Performed by: RADIOLOGY

## 2023-03-13 PROCEDURE — 78708 K FLOW/FUNCT IMAGE W/DRUG: CPT

## 2023-03-13 PROCEDURE — 0 TECHNETIUM MERTIATIDE: Performed by: UROLOGY

## 2023-03-13 RX ORDER — FUROSEMIDE 10 MG/ML
19 INJECTION INTRAMUSCULAR; INTRAVENOUS ONCE
Status: COMPLETED | OUTPATIENT
Start: 2023-03-13 | End: 2023-03-13

## 2023-03-13 RX ADMIN — FUROSEMIDE 19 MG: 10 INJECTION, SOLUTION INTRAMUSCULAR; INTRAVENOUS at 14:06

## 2023-03-13 RX ADMIN — TECHNESCAN TC 99M MERTIATIDE 1 DOSE: 1 INJECTION, POWDER, LYOPHILIZED, FOR SOLUTION INTRAVENOUS at 13:53

## 2023-03-13 NOTE — TELEPHONE ENCOUNTER
Spoke with patient and scheduled her follow up appointment for 3/27/23 at 10:45.   She verbalized understanding.

## 2023-03-15 RX ORDER — PROPRANOLOL HYDROCHLORIDE 80 MG/1
CAPSULE, EXTENDED RELEASE ORAL
Qty: 90 CAPSULE | Refills: 1 | Status: SHIPPED | OUTPATIENT
Start: 2023-03-15

## 2023-03-27 ENCOUNTER — OFFICE VISIT (OUTPATIENT)
Dept: UROLOGY | Facility: CLINIC | Age: 63
End: 2023-03-27
Payer: COMMERCIAL

## 2023-03-27 VITALS — BODY MASS INDEX: 22.63 KG/M2 | HEIGHT: 66 IN | WEIGHT: 140.8 LBS

## 2023-03-27 DIAGNOSIS — N32.81 OAB (OVERACTIVE BLADDER): ICD-10-CM

## 2023-03-27 DIAGNOSIS — N13.30 HYDRONEPHROSIS, UNSPECIFIED HYDRONEPHROSIS TYPE: Primary | ICD-10-CM

## 2023-03-27 LAB
BILIRUB BLD-MCNC: NEGATIVE MG/DL
CLARITY, POC: CLEAR
COLOR UR: YELLOW
EXPIRATION DATE: NORMAL
GLUCOSE UR STRIP-MCNC: NEGATIVE MG/DL
KETONES UR QL: NEGATIVE
LEUKOCYTE EST, POC: NEGATIVE
Lab: NORMAL
NITRITE UR-MCNC: NEGATIVE MG/ML
PH UR: 6 [PH] (ref 5–8)
PROT UR STRIP-MCNC: NEGATIVE MG/DL
RBC # UR STRIP: NEGATIVE /UL
SP GR UR: 1.01 (ref 1–1.03)
UROBILINOGEN UR QL: NORMAL

## 2023-03-27 PROCEDURE — 99214 OFFICE O/P EST MOD 30 MIN: CPT | Performed by: UROLOGY

## 2023-03-27 PROCEDURE — 1159F MED LIST DOCD IN RCRD: CPT | Performed by: UROLOGY

## 2023-03-27 PROCEDURE — 1160F RVW MEDS BY RX/DR IN RCRD: CPT | Performed by: UROLOGY

## 2023-03-27 RX ORDER — OXYBUTYNIN CHLORIDE 5 MG/1
5 TABLET, EXTENDED RELEASE ORAL DAILY
Qty: 30 TABLET | Refills: 11 | Status: SHIPPED | OUTPATIENT
Start: 2023-03-27

## 2023-03-27 NOTE — PROGRESS NOTES
"Chief Complaint  Hydronephorsis    Subjective          Danielle Manuel presents to [facility] UROLOGY  History of Present Illness    The patient presents today for a follow up.    The patient reports that she is doing well. She states that she is still experiencing pain. She reports that she has a lot of issues urinating. She adds that she can sit all day and when she gets up, she does not feel like she has to go. She has a hard time getting urine in the cup because it seems to go everywhere.    A review of systems was completed and positive findings are noted in the HPI.    Objective   Vital Signs:   Ht 167.6 cm (66\")   Wt 63.9 kg (140 lb 12.8 oz)   BMI 22.73 kg/m²       Physical Exam  Vitals and nursing note reviewed.   Constitutional:       Appearance: Normal appearance. She is well-developed.   Pulmonary:      Effort: Pulmonary effort is normal.      Breath sounds: Normal air entry.   Neurological:      Mental Status: She is alert and oriented to person, place, and time.      Motor: Motor function is intact.   Psychiatric:         Mood and Affect: Mood normal.         Behavior: Behavior normal.          Result Review :   The following data was reviewed by: Nereyda Powers MD on 03/27/2023:    Results for orders placed or performed in visit on 03/27/23   POC Urinalysis Dipstick, Automated    Specimen: Urine   Result Value Ref Range    Color Yellow Yellow, Straw, Dark Yellow, Azra    Clarity, UA Clear Clear    Specific Gravity  1.015 1.005 - 1.030    pH, Urine 6.0 5.0 - 8.0    Leukocytes Negative Negative    Nitrite, UA Negative Negative    Protein, POC Negative Negative mg/dL    Glucose, UA Negative Negative mg/dL    Ketones, UA Negative Negative    Urobilinogen, UA 0.2 E.U./dL Normal, 0.2 E.U./dL    Bilirubin Negative Negative    Blood, UA Negative Negative    Lot Number [id number]     Expiration Date 42,024          Data reviewed: Radiologic studies Renal scan:   Results    Procedure " Component Value Ref Range Date/Time   NM Renal With Flow & Function With Pharmacological Intervention [[id number]] Collected: 03/13/23 1511   Order Status: Completed Updated: 03/13/23 1515   Narrative:     PROCEDURE: NM RENAL W FLOW AND FUNCTION W PHARMACOLOGICAL INTERVENTION       COMPARISON: [facility], CT, CT ABDOMEN PELVIS WO CONTRAST, 1/24/2023, 13:34.       INDICATIONS: right hydronephrosis       TECHNIQUE: After obtaining the patient's consent, a bolus intravenous injection of Tc-99m MAG-3 was   administered.  Sequential renal flow images were performed followed by sequential static images.     Quantitative analysis was performed of both kidneys. Subsequently, the patient was given   intravenous Lasix for determination of renal washout.     RADIONUCLIDE:         10 mci    Tc99m Mag3 - I.V.       FINDINGS:   ARTERIAL PERFUSION: There is decreased perfusion to the right kidney compared to the left kidney.   TIME TO PEAK (LEFT): Normal-less than five minutes.     TIME TO PEAK (RIGHT): Normal-less than five minutes.     UPTAKE/FUNCTION: Left 97 % : Right 3 %.     T 1/2 POST-LASIX (LEFT): 9 minutes.  (Normal < 10 min, equivocal 10-20 min, abnormal > 20 min.).     T 1/2 POST-LASIX (RIGHT): Not applicable   BLADDER: Normal.     OTHER: Negative.         Impression:       1. Barely any right renal function present.   2. Left renal function appears normal.                MARYSOL FRENCH MD         Electronically Signed and Approved By: MARYSOL FRENCH MD on 3/13/2023 at 15:11                 Assessment and Plan    Diagnoses and all orders for this visit:    1. Hydronephrosis, unspecified hydronephrosis type (Primary)  -     POC Urinalysis Dipstick, Automated    2. OAB (overactive bladder)  -     oxybutynin XL (DITROPAN-XL) 5 MG 24 hr tablet; Take 1 tablet by mouth Daily.  Dispense: 30 tablet; Refill: 11    Overactive bladder.  - We will start her on oxybutynin ER 5 mg once a day and I will see her back  in 2 months.    Hydronephrosis  -  I reviewed her renal scan with her, which shows essentially no function of her right kidney. Her left kidney is working and draining well. No further work-up needed for that essentially she has a nonfunctional kidney from obstruction but no way to repair it at this point. We would only consider taking that kidney out if she was having recurrent infections or pain neither of which is she having an issue with right now.         Follow Up       Return in about 2 months (around 5/27/2023) for Next scheduled follow up.  Patient was given instructions and counseling regarding her condition or for health maintenance advice. Please see specific information pulled into the AVS if appropriate.     Transcribed from ambient dictation for Nereyda Powers MD by Marina Bello, Quality .  03/27/23   12:39 EDT    Patient or patient representative verbalized consent to the visit recording.  I have personally performed the services described in this document as transcribed by the above individual, and it is both accurate and complete.  Nereyda Powers MD  3/28/2023  14:03 EDT

## 2023-04-10 ENCOUNTER — TRANSCRIBE ORDERS (OUTPATIENT)
Dept: ADMINISTRATIVE | Facility: HOSPITAL | Age: 63
End: 2023-04-10
Payer: COMMERCIAL

## 2023-04-10 DIAGNOSIS — Z78.0 POST-MENOPAUSAL: Primary | ICD-10-CM

## 2023-04-12 ENCOUNTER — HOSPITAL ENCOUNTER (EMERGENCY)
Facility: HOSPITAL | Age: 63
Discharge: HOME | End: 2023-04-12
Attending: EMERGENCY MEDICINE | Admitting: EMERGENCY MEDICINE
Payer: COMMERCIAL

## 2023-04-12 ENCOUNTER — APPOINTMENT (OUTPATIENT)
Dept: GENERAL RADIOLOGY | Facility: HOSPITAL | Age: 63
End: 2023-04-12
Payer: COMMERCIAL

## 2023-04-12 VITALS
WEIGHT: 140 LBS | OXYGEN SATURATION: 98 % | DIASTOLIC BLOOD PRESSURE: 87 MMHG | HEART RATE: 82 BPM | HEIGHT: 66 IN | RESPIRATION RATE: 18 BRPM | SYSTOLIC BLOOD PRESSURE: 125 MMHG | BODY MASS INDEX: 22.5 KG/M2 | TEMPERATURE: 99 F

## 2023-04-12 DIAGNOSIS — M25.561 ACUTE PAIN OF RIGHT KNEE: Primary | ICD-10-CM

## 2023-04-12 LAB
BASOPHILS # BLD AUTO: 0.07 10*3/MM3 (ref 0–0.2)
BASOPHILS NFR BLD AUTO: 0.7 % (ref 0–1.5)
CRP SERPL-MCNC: 0.5 MG/DL (ref 0–0.5)
DEPRECATED RDW RBC AUTO: 51 FL (ref 37–54)
EOSINOPHIL # BLD AUTO: 0.97 10*3/MM3 (ref 0–0.4)
EOSINOPHIL NFR BLD AUTO: 10.2 % (ref 0.3–6.2)
ERYTHROCYTE [DISTWIDTH] IN BLOOD BY AUTOMATED COUNT: 15 % (ref 12.3–15.4)
ERYTHROCYTE [SEDIMENTATION RATE] IN BLOOD: 15 MM/HR (ref 0–30)
HCT VFR BLD AUTO: 37.5 % (ref 34–46.6)
HGB BLD-MCNC: 12.9 G/DL (ref 12–15.9)
IMM GRANULOCYTES # BLD AUTO: 0.04 10*3/MM3 (ref 0–0.05)
IMM GRANULOCYTES NFR BLD AUTO: 0.4 % (ref 0–0.5)
LYMPHOCYTES # BLD AUTO: 1.14 10*3/MM3 (ref 0.7–3.1)
LYMPHOCYTES NFR BLD AUTO: 12 % (ref 19.6–45.3)
MCH RBC QN AUTO: 31.9 PG (ref 26.6–33)
MCHC RBC AUTO-ENTMCNC: 34.4 G/DL (ref 31.5–35.7)
MCV RBC AUTO: 92.6 FL (ref 79–97)
MONOCYTES # BLD AUTO: 0.93 10*3/MM3 (ref 0.1–0.9)
MONOCYTES NFR BLD AUTO: 9.8 % (ref 5–12)
NEUTROPHILS NFR BLD AUTO: 6.32 10*3/MM3 (ref 1.7–7)
NEUTROPHILS NFR BLD AUTO: 66.9 % (ref 42.7–76)
NRBC BLD AUTO-RTO: 0 /100 WBC (ref 0–0.2)
PLATELET # BLD AUTO: 237 10*3/MM3 (ref 140–450)
PMV BLD AUTO: 10.9 FL (ref 6–12)
RBC # BLD AUTO: 4.05 10*6/MM3 (ref 3.77–5.28)
RBC MORPH BLD: NORMAL
SMALL PLATELETS BLD QL SMEAR: ADEQUATE
URATE SERPL-MCNC: 3.1 MG/DL (ref 2.4–5.7)
WBC MORPH BLD: NORMAL
WBC NRBC COR # BLD: 9.47 10*3/MM3 (ref 3.4–10.8)

## 2023-04-12 PROCEDURE — 86140 C-REACTIVE PROTEIN: CPT

## 2023-04-12 PROCEDURE — 73562 X-RAY EXAM OF KNEE 3: CPT

## 2023-04-12 PROCEDURE — 85007 BL SMEAR W/DIFF WBC COUNT: CPT

## 2023-04-12 PROCEDURE — 99282 EMERGENCY DEPT VISIT SF MDM: CPT

## 2023-04-12 PROCEDURE — 85652 RBC SED RATE AUTOMATED: CPT

## 2023-04-12 PROCEDURE — 96372 THER/PROPH/DIAG INJ SC/IM: CPT

## 2023-04-12 PROCEDURE — 25010000002 KETOROLAC TROMETHAMINE PER 15 MG

## 2023-04-12 PROCEDURE — 85025 COMPLETE CBC W/AUTO DIFF WBC: CPT

## 2023-04-12 PROCEDURE — 84550 ASSAY OF BLOOD/URIC ACID: CPT

## 2023-04-12 PROCEDURE — 36415 COLL VENOUS BLD VENIPUNCTURE: CPT

## 2023-04-12 RX ORDER — KETOROLAC TROMETHAMINE 10 MG/1
10 TABLET, FILM COATED ORAL EVERY 6 HOURS PRN
Qty: 15 TABLET | Refills: 0 | Status: SHIPPED | OUTPATIENT
Start: 2023-04-12

## 2023-04-12 RX ORDER — KETOROLAC TROMETHAMINE 30 MG/ML
30 INJECTION, SOLUTION INTRAMUSCULAR; INTRAVENOUS ONCE
Status: COMPLETED | OUTPATIENT
Start: 2023-04-12 | End: 2023-04-12

## 2023-04-12 RX ADMIN — KETOROLAC TROMETHAMINE 30 MG: 30 INJECTION, SOLUTION INTRAMUSCULAR; INTRAVENOUS at 13:21

## 2023-04-12 NOTE — DISCHARGE INSTRUCTIONS
Your x-ray last night in the emergency department today are normal.  Take Toradol as needed for pain control.  Follow-up with your primary care provider if symptoms not improved.  Return to the emergency department for worsening symptoms.

## 2023-04-12 NOTE — ED PROVIDER NOTES
Time: 12:20 PM EDT  Date of encounter:  4/12/2023  Independent Historian/Clinical History and Information was obtained by:   Patient  Chief Complaint: right knee pain    History is limited by: N/A    History of Present Illness:  Patient is a 62 y.o. year old female who presents to the emergency department for evaluation of right knee pain.  Patient states that her pain started yesterday around noon.  Patient states she is now having worsening pain with weightbearing and feels like she is having difficulty with range of motion due to the pain.  Patient currently rates her pain 10 out of 10.  Patient states that she did take tramadol this morning which she takes for chronic back pain but it did not improve her knee pain.  Patient denies any injury.  Patient denies any fever.  Patient denies history of DVT.  Patient states she was seen in urgent care prior to arrival and recommended to come to the emergency department.  Patient denies history of gout.    HPI    Patient Care Team  Primary Care Provider: Teofilo Ordonez DO    Past Medical History:     Allergies   Allergen Reactions   • Penicillins Hives, Itching and Rash   • Cyclobenzaprine Itching     Past Medical History:   Diagnosis Date   • Acid reflux    • Anxiety disorder 05/05/2021   • Arthritis 09/01/2021   • Bunion 1990   • Cancer 01/01/2018    skin   • Chronic pain 05/05/2021   • Claustrophobia 01/01/1990   • COPD (chronic obstructive pulmonary disease) 10/01/2021   • CTS (carpal tunnel syndrome) 01/01/1990   • Depression 01/2011   • Difficulty walking 2022   • Headache 09/072021   • Hyperlipemia    • Hyperlipidemia 05/05/2021   • Ingrown toenail 1977   • Insomnia, unspecified 05/05/2021   • Low back pain 02/01/2021   • Lupus    • MRSA (methicillin resistant Staphylococcus aureus) 06/01/2021   • Neuropathy of both feet 2/12/2023   • Osteopenia 01/01/2010   • Post menopausal syndrome 05/05/2021   • Thoracic disc disorder 02/01/2021   • Vitamin D deficiency  05/05/2021     Past Surgical History:   Procedure Laterality Date   • APPENDECTOMY     • CARPAL TUNNEL RELEASE  03/01/1990   • COLONOSCOPY  cologuard   • ENDOSCOPY N/A 08/08/2022    Procedure: ESOPHAGOGASTRODUODENOSCOPY;  Surgeon: Harmony Thacker MD;  Location: [facility] ENDOSCOPY;  Service: Gastroenterology;  Laterality: N/A;  NORMAL EGD   • HYSTERECTOMY     • TONSILECTOMY, ADENOIDECTOMY, BILATERAL MYRINGOTOMY AND TUBES     • TONSILLECTOMY     • TUBAL ABDOMINAL LIGATION       Family History   Problem Relation Age of Onset   • Arthritis Mother    • Anxiety disorder Mother    • Hearing loss Mother    • Hyperlipidemia Mother    • Heart disease Father    • Alcohol abuse Father    • Early death Father    • Diabetes Brother    • Hyperlipidemia Brother    • Depression Son    • Cancer Maternal Uncle    • COPD Paternal Uncle    • Heart disease Paternal Uncle    • Hyperlipidemia Paternal Uncle    • Cancer Maternal Grandmother    • Cancer Maternal Grandfather    • Early death Maternal Grandfather    • Pancreatic cancer Other    • Diabetes Other         MELLITUS       Home Medications:  Prior to Admission medications    Medication Sig Start Date End Date Taking? Authorizing Provider   calcium carbonate (OS-PATTIE) 1250 (500 Ca) MG tablet Take 1 tablet by mouth Daily As Needed for Indigestion or Heartburn.    Sneha Guadalupe MD   chlorhexidine (PERIDEX) 0.12 % solution Apply 15 mL to the mouth or throat 2 (Two) Times a Day As Needed. 7/28/22   Sneha Guadalupe MD   chlorthalidone (HYGROTON) 25 MG tablet TAKE ONE TABLET BY MOUTH DAILY 3/9/23   Teofilo Ordonez DO   clobetasol (TEMOVATE) 0.05 % ointment Apply 1 application topically to the appropriate area as directed 2 (Two) Times a Day. 2/13/23   Amairani Stark MD   clonazePAM (KlonoPIN) 1 MG tablet Take 1 tablet by mouth At Night As Needed for Anxiety. 2/21/23   Teofilo Ordonez DO   desonide (DESOWEN) 0.05 % ointment Apply 1 application topically to the appropriate  area as directed 3 (Three) Times a Day As Needed. 9/15/22   Sneha Guadalupe MD   gabapentin (NEURONTIN) 300 MG capsule Take 1 capsule by mouth 3 (Three) Times a Day. 7/26/21   Purnima Bazan APRN   nicotine (Nicoderm CQ) 14 MG/24HR patch Place 1 patch on the skin as directed by provider Daily. 2/6/23   Teofilo Ordonez DO   nicotine polacrilex (COMMIT) 2 MG lozenge Dissolve 1 lozenge in the mouth As Needed for Smoking Cessation. 2/6/23   Teofilo Ordonez DO   Omega-3 Fatty Acids (fish oil) 1000 MG capsule capsule Take 1 capsule by mouth Daily With Breakfast.    Sneha Guadalupe MD   ondansetron ODT (ZOFRAN-ODT) 4 MG disintegrating tablet Place 1 tablet on the tongue Every 12 (Twelve) Hours As Needed for Nausea or Vomiting.    Senha Guadalupe MD   oxybutynin XL (DITROPAN-XL) 5 MG 24 hr tablet Take 1 tablet by mouth Daily. 3/27/23   Nereyda Powers MD   pantoprazole (PROTONIX) 40 MG EC tablet Take 1 tablet by mouth Daily. for 10 days.  Patient taking differently: Take 1 tablet by mouth Daily As Needed. 8/17/22   Teofilo Ordonez DO   propranolol LA (INDERAL LA) 80 MG 24 hr capsule TAKE ONE CAPSULE BY MOUTH DAILY 3/15/23   Teofilo Ordonez DO   tiotropium (Spiriva HandiHaler) 18 MCG per inhalation capsule Place 1 capsule into inhaler and inhale Daily. 2/21/23   Teofilo Ordonez DO   traMADol (ULTRAM) 50 MG tablet Take 1 tablet by mouth Daily As Needed for Moderate Pain . 5/12/22   Teofilo Ordonez DO   vitamin D (ERGOCALCIFEROL) 1.25 MG (89040 UT) capsule capsule Take 1 capsule by mouth Every 7 (Seven) Days. 4/19/22   Sneha Guadalupe MD        Social History:   Social History     Tobacco Use   • Smoking status: Some Days     Packs/day: 0.50     Years: 35.00     Pack years: 17.50     Types: Cigarettes     Start date: 1/1/1986     Passive exposure: Past   • Smokeless tobacco: Never   Vaping Use   • Vaping Use: Never used   Substance Use Topics   • Alcohol use: Not Currently      "Alcohol/week: 2.0 standard drinks     Types: 2 Cans of beer per week     Comment: CURRENT SOME DAY, DRINKS LESS THAN 1 DRINK PER DAY, HAS BEEN DRINKING FOR 21-30 YEARS    • Drug use: Never         Review of Systems:  Review of Systems   Constitutional: Negative for fever.   Musculoskeletal: Positive for arthralgias and joint swelling.   Skin: Negative for wound.   All other systems reviewed and are negative.       Physical Exam:  /87 (BP Location: Left arm, Patient Position: Sitting)   Pulse 82   Temp 99 °F (37.2 °C) (Oral)   Resp 16   Ht 167.6 cm (66\")   Wt 63.5 kg (140 lb)   SpO2 98%   BMI 22.60 kg/m²     Physical Exam  Vitals and nursing note reviewed.   Constitutional:       General: She is not in acute distress.     Appearance: Normal appearance. She is normal weight. She is not ill-appearing, toxic-appearing or diaphoretic.   HENT:      Head: Normocephalic and atraumatic.      Nose: Nose normal.   Eyes:      Extraocular Movements: Extraocular movements intact.      Conjunctiva/sclera: Conjunctivae normal.      Pupils: Pupils are equal, round, and reactive to light.   Cardiovascular:      Rate and Rhythm: Normal rate and regular rhythm.      Heart sounds: Normal heart sounds.   Pulmonary:      Effort: Pulmonary effort is normal.      Breath sounds: Normal breath sounds.   Abdominal:      General: Abdomen is flat. There is no distension.   Musculoskeletal:      Cervical back: Normal range of motion and neck supple.      Right knee: Swelling and erythema present. No deformity, effusion, ecchymosis, lacerations or bony tenderness. Decreased range of motion (flexion and extension limited). Tenderness (anterior) present. Normal pulse.      Comments: Skin of knee is warm to touch   Skin:     General: Skin is warm and dry.   Neurological:      General: No focal deficit present.      Mental Status: She is alert and oriented to person, place, and time.   Psychiatric:         Mood and Affect: Mood normal.    "      Behavior: Behavior normal.         Thought Content: Thought content normal.         Judgment: Judgment normal.                  Procedures:  Procedures      Medical Decision Making:    Comorbidities that affect care:    None    External Notes reviewed:    Previous Clinic Note: Urgent care note from today reviewed      The following orders were placed and all results were independently analyzed by me:  Orders Placed This Encounter   Procedures   • XR Knee 3 View Right   • Sedimentation Rate   • C-reactive Protein   • Uric Acid   • CBC Auto Differential   • Scan Slide   • CBC & Differential       Medications Given in the Emergency Department:  Medications   ketorolac (TORADOL) injection 30 mg (30 mg Intramuscular Given 4/12/23 1321)        ED Course:         Labs:    Lab Results (last 24 hours)     Procedure Component Value Units Date/Time    Sedimentation Rate [[id number]]  (Normal) Collected: 04/12/23 1320    Specimen: Blood Updated: 04/12/23 1342     Sed Rate 15 mm/hr     C-reactive Protein [[id number]]  (Normal) Collected: 04/12/23 1320    Specimen: Blood Updated: 04/12/23 1345     C-Reactive Protein 0.50 mg/dL     CBC & Differential [[id number]]  (Abnormal) Collected: 04/12/23 1320    Specimen: Blood Updated: 04/12/23 1356    Narrative:      The following orders were created for panel order CBC & Differential.  Procedure                               Abnormality         Status                     ---------                               -----------         ------                     CBC Auto Differential[[id number]]        Abnormal            Final result               Scan Slide[[id number]]                                       Final result                 Please view results for these tests on the individual orders.    Uric Acid [[id number]]  (Normal) Collected: 04/12/23 1320    Specimen: Blood Updated: 04/12/23 1345     Uric Acid 3.1 mg/dL     CBC Auto Differential [[id number]]  (Abnormal) Collected: 04/12/23  1320    Specimen: Blood Updated: 04/12/23 1356     WBC 9.47 10*3/mm3      RBC 4.05 10*6/mm3      Hemoglobin 12.9 g/dL      Hematocrit 37.5 %      MCV 92.6 fL      MCH 31.9 pg      MCHC 34.4 g/dL      RDW 15.0 %      RDW-SD 51.0 fl      MPV 10.9 fL      Platelets 237 10*3/mm3      Neutrophil % 66.9 %      Lymphocyte % 12.0 %      Monocyte % 9.8 %      Eosinophil % 10.2 %      Basophil % 0.7 %      Immature Grans % 0.4 %      Neutrophils, Absolute 6.32 10*3/mm3      Lymphocytes, Absolute 1.14 10*3/mm3      Monocytes, Absolute 0.93 10*3/mm3      Eosinophils, Absolute 0.97 10*3/mm3      Basophils, Absolute 0.07 10*3/mm3      Immature Grans, Absolute 0.04 10*3/mm3      nRBC 0.0 /100 WBC     Scan Slide [[id number]] Collected: 04/12/23 1320    Specimen: Blood Updated: 04/12/23 1356     RBC Morphology Normal     WBC Morphology Normal     Platelet Estimate Adequate           Imaging:    XR Knee 3 View Right    Result Date: 4/12/2023  PROCEDURE: XR KNEE 3 VW RIGHT  COMPARISON: None  INDICATIONS: pain for 2 days  FINDINGS:  There is no acute fracture or dislocation. Joint spaces appear normal. No erosions. Soft tissues are unremarkable.  No effusion.       No acute osseous abnormality or significant degenerative change.       DIEUDONNE ARRIETA MD       Electronically Signed and Approved By: DIEUDONNE ARRIETA MD on 4/12/2023 at 13:04                 Differential Diagnosis and Discussion:    Joint Pain: Differential diagnosis includes but is not limited to polyarticular arthritis, gout, tendinitis, hemarthrosis, septic arthritis, rheumatoid arthritis, bursitis, degenerative joint disease, joint effusion, autoimmune disorder, trauma, and occult neoplasm.    All labs were reviewed and interpreted by me.  All X-rays impressions were independently interpreted by me.    MDM  Number of Diagnoses or Management Options  Diagnosis management comments: Patient presented to the emergency department for right knee pain.  X-ray of the right knee  showed no acute findings.  CBC, ESR, CRP, and uric acid are unremarkable.  Patient's pain was drastically improved in the emergency department with Toradol and I will prescribe this patient Toradol on outpatient basis and have her follow-up with her primary care provider.  Patient was given instructions on reasons to return the emergency department.       Amount and/or Complexity of Data Reviewed  Clinical lab tests: reviewed and ordered  Tests in the radiology section of CPT®: reviewed and ordered    Risk of Complications, Morbidity, and/or Mortality  Presenting problems: moderate  Diagnostic procedures: low  Management options: low    Patient Progress  Patient progress: stable       Patient Care Considerations:    NARCOTICS: I considered prescribing opiate pain medication as an outpatient, however Patient's pain improved with Toradol      Consultants/Shared Management Plan:    None    Social Determinants of Health:    Patient is independent, reliable, and has access to care.       Disposition and Care Coordination:    Discharged: The patient is suitable and stable for discharge with no need for consideration of observation or admission.    I have explained the patient´s condition, diagnoses and treatment plan based on the information available to me at this time. I have answered questions and addressed any concerns. The patient has a good  understanding of the patient´s diagnosis, condition, and treatment plan as can be expected at this point. The vital signs have been stable. The patient´s condition is stable and appropriate for discharge from the emergency department.      The patient will pursue further outpatient evaluation with the primary care physician or other designated or consulting physician as outlined in the discharge instructions. They are agreeable to this plan of care and follow-up instructions have been explained in detail. The patient has received these instructions in written format and have  expressed an understanding of the discharge instructions. The patient is aware that any significant change in condition or worsening of symptoms should prompt an immediate return to this or the closest emergency department or call to 911.  I have explained discharge medications and the need for follow up with the patient/caretakers. This was also printed in the discharge instructions. Patient was discharged with the following medications and follow up:      Medication List      New Prescriptions    ketorolac 10 MG tablet  Commonly known as: TORADOL  Take 1 tablet by mouth Every 6 (Six) Hours As Needed for Moderate Pain.        Changed    pantoprazole 40 MG EC tablet  Commonly known as: PROTONIX  Take 1 tablet by mouth Daily. for 10 days.  What changed:   · when to take this  · reasons to take this  · additional instructions           Where to Get Your Medications      These medications were sent to [facility] [id number] - [facility] - [address] AT [facility] [address] - [phone]  - [phone]   [address] [place] [address]    Phone: [phone]   · ketorolac 10 MG tablet      Teofilo Ordonez, DO  [address]    [facility] [id number] [phone]    Call          Final diagnoses:   Acute pain of right knee        ED Disposition     ED Disposition   Discharge    Condition   Stable    Comment   --           Documentation assistance provided by Pola Rowan acting as scribe for Frdedie Lozoya PA-C. Information recorded by the scribe was done at my direction and has been verified and validated by me.       This medical record created using voice recognition software.             Pola Rowan  04/12/23 123       Freddie Lozoya PA-C  04/12/23 0005

## 2023-04-19 ENCOUNTER — LAB (OUTPATIENT)
Dept: LAB | Facility: HOSPITAL | Age: 63
End: 2023-04-19
Payer: COMMERCIAL

## 2023-04-19 ENCOUNTER — OFFICE VISIT (OUTPATIENT)
Dept: FAMILY MEDICINE CLINIC | Facility: CLINIC | Age: 63
End: 2023-04-19
Payer: COMMERCIAL

## 2023-04-19 VITALS
TEMPERATURE: 98 F | OXYGEN SATURATION: 100 % | RESPIRATION RATE: 12 BRPM | HEART RATE: 87 BPM | HEIGHT: 66 IN | SYSTOLIC BLOOD PRESSURE: 117 MMHG | BODY MASS INDEX: 23.24 KG/M2 | WEIGHT: 144.6 LBS | DIASTOLIC BLOOD PRESSURE: 90 MMHG

## 2023-04-19 DIAGNOSIS — F41.9 ANXIETY DISORDER, UNSPECIFIED TYPE: ICD-10-CM

## 2023-04-19 DIAGNOSIS — M79.672 BILATERAL FOOT PAIN: ICD-10-CM

## 2023-04-19 DIAGNOSIS — M32.19 LUPUS VASCULITIS: Primary | ICD-10-CM

## 2023-04-19 DIAGNOSIS — M79.671 BILATERAL FOOT PAIN: ICD-10-CM

## 2023-04-19 DIAGNOSIS — G89.4 CHRONIC PAIN SYNDROME: ICD-10-CM

## 2023-04-19 DIAGNOSIS — E78.2 MIXED HYPERLIPIDEMIA: ICD-10-CM

## 2023-04-19 DIAGNOSIS — M10.369 ACUTE GOUT DUE TO RENAL IMPAIRMENT INVOLVING KNEE, UNSPECIFIED LATERALITY: ICD-10-CM

## 2023-04-19 DIAGNOSIS — R13.19 ESOPHAGEAL DYSPHAGIA: ICD-10-CM

## 2023-04-19 DIAGNOSIS — G57.93 NEUROPATHY OF BOTH FEET: ICD-10-CM

## 2023-04-19 DIAGNOSIS — M25.561 CHRONIC PAIN OF BOTH KNEES: Chronic | ICD-10-CM

## 2023-04-19 DIAGNOSIS — F41.9 ANXIETY DISORDER, UNSPECIFIED TYPE: Chronic | ICD-10-CM

## 2023-04-19 DIAGNOSIS — F51.01 PRIMARY INSOMNIA: ICD-10-CM

## 2023-04-19 DIAGNOSIS — M79.89 SWELLING OF BOTH LOWER EXTREMITIES: ICD-10-CM

## 2023-04-19 DIAGNOSIS — G89.29 CHRONIC PAIN OF BOTH KNEES: Chronic | ICD-10-CM

## 2023-04-19 DIAGNOSIS — Z79.899 LONG TERM CURRENT USE OF IMMUNOSUPPRESSIVE DRUG: ICD-10-CM

## 2023-04-19 DIAGNOSIS — I77.89 LUPUS VASCULITIS: ICD-10-CM

## 2023-04-19 DIAGNOSIS — M32.19 LUPUS VASCULITIS: ICD-10-CM

## 2023-04-19 DIAGNOSIS — M25.562 CHRONIC PAIN OF BOTH KNEES: Chronic | ICD-10-CM

## 2023-04-19 DIAGNOSIS — I77.89 LUPUS VASCULITIS: Primary | ICD-10-CM

## 2023-04-19 PROBLEM — E87.1 HYPONATREMIA: Status: ACTIVE | Noted: 2023-02-27

## 2023-04-19 PROBLEM — Z71.6 TOBACCO ABUSE COUNSELING: Status: ACTIVE | Noted: 2023-03-01

## 2023-04-19 PROBLEM — M19.90 IDIOPATHIC OSTEOARTHRITIS: Status: ACTIVE | Noted: 2023-02-27

## 2023-04-19 PROBLEM — K21.9 GASTROESOPHAGEAL REFLUX DISEASE: Status: ACTIVE | Noted: 2023-02-27

## 2023-04-19 LAB
25(OH)D3 SERPL-MCNC: 32 NG/ML (ref 30–100)
CA-I BLD-MCNC: 5.2 MG/DL (ref 4.6–5.4)
CA-I SERPL ISE-MCNC: 1.29 MMOL/L (ref 1.15–1.35)
PHOSPHATE SERPL-MCNC: 3.7 MG/DL (ref 2.5–4.5)
PTH-INTACT SERPL-MCNC: 35.3 PG/ML (ref 15–65)

## 2023-04-19 PROCEDURE — 86160 COMPLEMENT ANTIGEN: CPT

## 2023-04-19 PROCEDURE — 86235 NUCLEAR ANTIGEN ANTIBODY: CPT

## 2023-04-19 PROCEDURE — 86225 DNA ANTIBODY NATIVE: CPT

## 2023-04-19 PROCEDURE — 84100 ASSAY OF PHOSPHORUS: CPT

## 2023-04-19 PROCEDURE — 83516 IMMUNOASSAY NONANTIBODY: CPT

## 2023-04-19 PROCEDURE — 86038 ANTINUCLEAR ANTIBODIES: CPT

## 2023-04-19 PROCEDURE — 82306 VITAMIN D 25 HYDROXY: CPT

## 2023-04-19 PROCEDURE — 82330 ASSAY OF CALCIUM: CPT | Performed by: FAMILY MEDICINE

## 2023-04-19 PROCEDURE — 36415 COLL VENOUS BLD VENIPUNCTURE: CPT

## 2023-04-19 PROCEDURE — 86431 RHEUMATOID FACTOR QUANT: CPT

## 2023-04-19 PROCEDURE — 83970 ASSAY OF PARATHORMONE: CPT

## 2023-04-19 RX ORDER — PREDNISONE 20 MG/1
20 TABLET ORAL 2 TIMES DAILY
Qty: 14 TABLET | Refills: 0 | Status: SHIPPED | OUTPATIENT
Start: 2023-04-19

## 2023-04-19 RX ORDER — DEUCRAVACITINIB 6 MG/1
1 TABLET, FILM COATED ORAL DAILY
COMMUNITY

## 2023-04-19 NOTE — PROGRESS NOTES
Chief Complaint  ER follow up and Knee Pain (Bilateral knee pain)    Subjective        Danielle Manuel presents to [facility]  History of Present Illness    Presents to follow-up on knee pain bilaterally went to the ER had labs work-up imaging showed no significant findings related to pain she was complaining of, differential includes gout arthritis or relation to autoimmune disorder lupus or other.  She has chronic pain worsening in the joints and migrating at times as it was previously very severe in her hip which is improved now similar pain has migrated to her knees.    I discussed ordering labs and starting immunosuppressive medications for her condition if appropriate such as methotrexate hydroxychloroquine or others and will review labs have follow-up with specialist if able to help better manage condition and pain.      Last seen 2/2023  Patient with lupus vasculitis, has some hydronephrosis and chronic right kidney and scarring and dysfunction. She is to follow up with urology with a study ordered to evaluate kidney function. Nephrology appointment upcoming and pain management. Referral to rheumatology as soon as able, first available and recommended. Labs have been ordered to check several monitoring factors and labs for lupus. She is overall stable. Wound is improving on her feet bilaterally, seen wound care, has upcoming appointment with dermatology. We made some medication adjustments for patient trying to help manage and get to other specialist to help with this multifactorial autoimmune disease as it seems to be progressing. Close follow-up with patient. AYLA reviewed, contract on file. Has pain medicine as needed, Klonopin p.r.n. Can take BuSpar p.r.n. for anxiety, which is improved. We will review medicines and specialist recommendations as soon as able.       ED on 4/14/2023  exam  Right knee: Swelling and erythema present. No deformity, effusion, ecchymosis,  "lacerations or bony tenderness. Decreased range of motion (flexion and extension limited). Tenderness (anterior) present. Normal pulse.      Comments: Skin of knee is warm to touch     Result Date: 4/12/2023  PROCEDURE:            XR KNEE 3 VW RIGHT  COMPARISON:      None  INDICATIONS: pain for 2 days  FINDINGS:            There is no acute fracture or dislocation. Joint spaces appear normal. No erosions. Soft tissues are unremarkable.  No effusion.                   No acute osseous abnormality or significant degenerative change    Rx ketolorac prn 10mg, AYLA reviewed takes prn pain medicine      Objective   Vital Signs:  /90   Pulse 87   Temp 98 °F (36.7 °C)   Resp 12   Ht 167.6 cm (66\")   Wt 65.6 kg (144 lb 9.6 oz)   SpO2 100%   BMI 23.34 kg/m²   Estimated body mass index is 23.34 kg/m² as calculated from the following:    Height as of this encounter: 167.6 cm (66\").    Weight as of this encounter: 65.6 kg (144 lb 9.6 oz).       BMI is within normal parameters. No other follow-up for BMI required.      Physical Exam  Vitals reviewed.   Constitutional:       Appearance: Normal appearance. She is well-developed.   HENT:      Head: Normocephalic and atraumatic.      Right Ear: External ear normal.      Left Ear: External ear normal.      Nose: Nose normal.   Eyes:      Conjunctiva/sclera: Conjunctivae normal.      Pupils: Pupils are equal, round, and reactive to light.   Cardiovascular:      Rate and Rhythm: Normal rate.   Pulmonary:      Effort: Pulmonary effort is normal.      Breath sounds: Normal breath sounds.   Abdominal:      General: There is no distension.   Musculoskeletal:      Right knee: Effusion and bony tenderness present.      Left knee: Bony tenderness present. No effusion.   Skin:     General: Skin is warm and dry.      Comments: Significant skin lesions chronic, improving   Neurological:      General: No focal deficit present.      Mental Status: She is alert and oriented to person, " place, and time. Mental status is at baseline.      Motor: Weakness present.   Psychiatric:         Mood and Affect: Mood and affect normal.         Behavior: Behavior normal.         Thought Content: Thought content normal.         Judgment: Judgment normal.        Result Review :  The following data was reviewed by: Teoflio Ordonez DO on 04/19/2023:  Common labs        1/27/2023    06:31 2/6/2023    11:10 4/12/2023    13:20   Common Labs   Glucose 105   86      BUN 13   7      Creatinine 0.89   0.80      Sodium 132   134      Potassium 4.5   4.6      Chloride 98   100      Calcium 8.9   9.7      Albumin  4.3      WBC  4.69   9.47     Hemoglobin  13.7   12.9     Hematocrit  41.4   37.5     Platelets  237   237     Total Cholesterol  222      Triglycerides  102      HDL Cholesterol  85      LDL Cholesterol   119      Hemoglobin A1C  5.50      Microalbumin, Urine  <1.2      Uric Acid   3.1       Data reviewed: Radiologic studies XR knee from 4/2023 and Recent hospitalization notes ED note from 4/2023             Assessment and Plan   Diagnoses and all orders for this visit:    1. Lupus vasculitis (Primary)  -     WILLIS Direct Reflex to 11 Biomarker; Future  -     C4+C3; Future  -     Calcium, Ionized  -     Phosphorus; Future  -     PTH, Intact; Future  -     Vitamin D 25 hydroxy; Future  -     Systemic Lupus Profile A; Future    2. Bilateral foot pain  -     WILLIS Direct Reflex to 11 Biomarker; Future  -     C4+C3; Future  -     Calcium, Ionized  -     Phosphorus; Future  -     PTH, Intact; Future  -     Vitamin D 25 hydroxy; Future    3. Chronic pain syndrome  -     WILLIS Direct Reflex to 11 Biomarker; Future  -     C4+C3; Future  -     Calcium, Ionized  -     Phosphorus; Future  -     PTH, Intact; Future  -     Vitamin D 25 hydroxy; Future  -     Systemic Lupus Profile A; Future    4. Swelling of both lower extremities  -     WILLIS Direct Reflex to 11 Biomarker; Future  -     C4+C3; Future  -     Calcium, Ionized  -      Phosphorus; Future  -     PTH, Intact; Future  -     Vitamin D 25 hydroxy; Future    5. Neuropathy of both feet  -     WILLIS Direct Reflex to 11 Biomarker; Future  -     C4+C3; Future  -     Calcium, Ionized  -     Phosphorus; Future  -     PTH, Intact; Future  -     Vitamin D 25 hydroxy; Future    6. Mixed hyperlipidemia  -     WILLIS Direct Reflex to 11 Biomarker; Future  -     C4+C3; Future  -     Calcium, Ionized  -     Phosphorus; Future  -     PTH, Intact; Future  -     Vitamin D 25 hydroxy; Future    7. Primary insomnia  -     WILLIS Direct Reflex to 11 Biomarker; Future  -     C4+C3; Future  -     Calcium, Ionized  -     Phosphorus; Future  -     PTH, Intact; Future  -     Vitamin D 25 hydroxy; Future    8. Esophageal dysphagia  -     WILLIS Direct Reflex to 11 Biomarker; Future  -     C4+C3; Future  -     Calcium, Ionized  -     Phosphorus; Future  -     PTH, Intact; Future  -     Vitamin D 25 hydroxy; Future    9. Long term current use of immunosuppressive drug  -     WLILIS Direct Reflex to 11 Biomarker; Future  -     C4+C3; Future  -     Calcium, Ionized  -     Phosphorus; Future  -     PTH, Intact; Future  -     Vitamin D 25 hydroxy; Future    10. Anxiety disorder, unspecified type  -     WILLIS Direct Reflex to 11 Biomarker; Future  -     C4+C3; Future  -     Calcium, Ionized  -     Phosphorus; Future  -     PTH, Intact; Future  -     Vitamin D 25 hydroxy; Future    11. Acute gout due to renal impairment involving knee, unspecified laterality  -     WILLIS Direct Reflex to 11 Biomarker; Future  -     C4+C3; Future  -     Calcium, Ionized  -     Phosphorus; Future  -     PTH, Intact; Future  -     Vitamin D 25 hydroxy; Future  -     Systemic Lupus Profile A; Future    12. Chronic pain of both knees    Other orders  -     predniSONE (DELTASONE) 20 MG tablet; Take 1 tablet by mouth 2 (Two) Times a Day.  Dispense: 14 tablet; Refill: 0        I discussed ordering labs and starting immunosuppressive medications for her  condition if appropriate such as methotrexate hydroxychloroquine or others and will review labs have follow-up with specialist if able to help better manage condition and pain.    Continue medicines for anxiety as prescribed Jony reviewed contract on file and UDS appropriate, condition is controlled has failed many first-line medications in the past and complicated by current condition of lupus     Blood pressure controlled at goal less than 140/90    Follow-up 1-2 months or sooner, at least a few weeks or month after we start medication if indicated as above for lupus and joint arthralgias           Follow Up   Return in about 2 months (around 6/19/2023), or if symptoms worsen or fail to improve, for Recheck.  Patient was given instructions and counseling regarding her condition or for health maintenance advice. Please see specific information pulled into the AVS if appropriate.       Answers for HPI/ROS submitted by the patient on 4/18/2023  Please describe your symptoms.: EXCRUTIATING KNEE PAIN  Have you had these symptoms before?: No  How long have you been having these symptoms?: 5-7 days  Please list any medications you are currently taking for this condition.: toradol  What is the primary reason for your visit?: Other

## 2023-04-20 LAB
C3 SERPL-MCNC: 106 MG/DL (ref 82–167)
C4 SERPL-MCNC: 14 MG/DL (ref 14–44)

## 2023-04-21 LAB
ANA SER QL: POSITIVE
CENTROMERE B AB SER-ACNC: <0.2 AI (ref 0–0.9)
CHROMATIN AB SERPL-ACNC: <0.2 AI (ref 0–0.9)
CHROMATIN AB SERPL-ACNC: <0.2 AI (ref 0–0.9)
DSDNA AB SER-ACNC: 2 IU/ML (ref 0–9)
DSDNA AB SER-ACNC: 2 IU/ML (ref 0–9)
ENA JO1 AB SER-ACNC: <0.2 AI (ref 0–0.9)
ENA RNP AB SER-ACNC: 0.2 AI (ref 0–0.9)
ENA RNP AB SER-ACNC: 0.2 AI (ref 0–0.9)
ENA SCL70 AB SER-ACNC: <0.2 AI (ref 0–0.9)
ENA SM AB SER-ACNC: <0.2 AI (ref 0–0.9)
ENA SM AB SER-ACNC: <0.2 AI (ref 0–0.9)
ENA SM+RNP AB SER-ACNC: <0.2 AI (ref 0–0.9)
ENA SS-A AB SER-ACNC: >8 AI (ref 0–0.9)
ENA SS-A AB SER-ACNC: >8 AI (ref 0–0.9)
ENA SS-B AB SER-ACNC: <0.2 AI (ref 0–0.9)
ENA SS-B AB SER-ACNC: <0.2 AI (ref 0–0.9)
Lab: ABNORMAL
RHEUMATOID FACT SERPL-ACNC: 394.8 IU/ML
RIBOSOMAL P AB SER-ACNC: <0.2 AI (ref 0–0.9)

## 2023-04-24 NOTE — PROGRESS NOTES
Im reviewing lab results which were positive and what next steps to take, will call tomorrow with clear plan, hope your feeling better

## 2023-05-02 DIAGNOSIS — M32.19 LUPUS VASCULITIS: Primary | ICD-10-CM

## 2023-05-02 DIAGNOSIS — I77.89 LUPUS VASCULITIS: Primary | ICD-10-CM

## 2023-05-02 DIAGNOSIS — M32.9 LUPUS: ICD-10-CM

## 2023-05-02 RX ORDER — FOLIC ACID 1 MG/1
1 TABLET ORAL DAILY
Qty: 90 TABLET | Refills: 3 | Status: SHIPPED | OUTPATIENT
Start: 2023-05-02

## 2023-05-07 PROBLEM — M25.562 CHRONIC PAIN OF BOTH KNEES: Status: ACTIVE | Noted: 2023-05-07

## 2023-05-07 PROBLEM — M25.561 CHRONIC PAIN OF BOTH KNEES: Status: ACTIVE | Noted: 2023-05-07

## 2023-05-07 PROBLEM — G89.29 CHRONIC PAIN OF BOTH KNEES: Status: ACTIVE | Noted: 2023-05-07

## 2023-05-16 ENCOUNTER — HOSPITAL ENCOUNTER (OUTPATIENT)
Dept: MAMMOGRAPHY | Facility: HOSPITAL | Age: 63
Discharge: HOME | End: 2023-05-16
Payer: COMMERCIAL

## 2023-05-16 ENCOUNTER — HOSPITAL ENCOUNTER (OUTPATIENT)
Dept: BONE DENSITY | Facility: HOSPITAL | Age: 63
Discharge: HOME | End: 2023-05-16
Payer: COMMERCIAL

## 2023-05-16 DIAGNOSIS — Z12.31 VISIT FOR SCREENING MAMMOGRAM: ICD-10-CM

## 2023-05-16 DIAGNOSIS — Z78.0 POST-MENOPAUSAL: ICD-10-CM

## 2023-05-16 PROCEDURE — 77063 BREAST TOMOSYNTHESIS BI: CPT

## 2023-05-16 PROCEDURE — 77080 DXA BONE DENSITY AXIAL: CPT

## 2023-05-16 PROCEDURE — 77067 SCR MAMMO BI INCL CAD: CPT

## 2023-05-22 ENCOUNTER — OFFICE VISIT (OUTPATIENT)
Dept: UROLOGY | Facility: CLINIC | Age: 63
End: 2023-05-22
Payer: COMMERCIAL

## 2023-05-22 VITALS — WEIGHT: 143 LBS | BODY MASS INDEX: 22.98 KG/M2 | HEIGHT: 66 IN

## 2023-05-22 DIAGNOSIS — N13.30 HYDRONEPHROSIS, UNSPECIFIED HYDRONEPHROSIS TYPE: Primary | ICD-10-CM

## 2023-05-22 DIAGNOSIS — N32.81 OAB (OVERACTIVE BLADDER): ICD-10-CM

## 2023-05-22 LAB
BILIRUB BLD-MCNC: NEGATIVE MG/DL
CLARITY, POC: CLEAR
COLOR UR: YELLOW
EXPIRATION DATE: ABNORMAL
GLUCOSE UR STRIP-MCNC: NEGATIVE MG/DL
KETONES UR QL: NEGATIVE
LEUKOCYTE EST, POC: ABNORMAL
Lab: ABNORMAL
NITRITE UR-MCNC: NEGATIVE MG/ML
PH UR: 6 [PH] (ref 5–8)
PROT UR STRIP-MCNC: NEGATIVE MG/DL
RBC # UR STRIP: NEGATIVE /UL
SP GR UR: 1.01 (ref 1–1.03)
URINE VOLUME: NORMAL
UROBILINOGEN UR QL: ABNORMAL

## 2023-05-22 NOTE — PROGRESS NOTES
"Chief Complaint  Hydronephrosis    Subjective          Danielle Manuel presents to [facility]     History of Present Illness  Patient has no complaints other than sometimes feeling as if she needs to go and cannot.  This is an intermittent sensation.  She otherwise is not having this urgency and urge incontinence that she wipes.  She is happy with the medicine other than again at times feeling as if she is not emptying well.    The patient presents today for a follow-up.    The patient is doing well. She has pain right above her vagina and sometimes it \"puts her down\". She notes it is not all the time. It is a sharp internal pain. She had a hysterectomy many years ago. The patient denies any other symptoms with it. She denies any vaginal discharge or bladder pain. The patient hates the feeling that she needs to urinate but is unable to. It is happening a lot. She feels like her bladder is full. She goes to the restroom and nothing happens. She gets up and it still feels like her bladder is full. She was not having it before starting the oxybutynin. She has not had a good stream since she has been on the medication.      Objective   Vital Signs:   Ht 167.6 cm (66\")   Wt 64.9 kg (143 lb)   BMI 23.08 kg/m²       Physical Exam  Vitals and nursing note reviewed.   Constitutional:       Appearance: Normal appearance. She is well-developed.   Pulmonary:      Effort: Pulmonary effort is normal.      Breath sounds: Normal air entry.   Neurological:      Mental Status: She is alert and oriented to person, place, and time.      Motor: Motor function is intact.   Psychiatric:         Mood and Affect: Mood normal.         Behavior: Behavior normal.          Result Review :   The following data was reviewed by: Nereyda Powers MD on 05/22/2023:    Results for orders placed or performed in visit on 05/22/23   Bladder Scan   Result Value Ref Range    Urine Volume 35ml    POC Urinalysis Dipstick, " Automated    Specimen: Urine   Result Value Ref Range    Color Yellow Yellow, Straw, Dark Yellow, Azra    Clarity, UA Clear Clear    Specific Gravity  1.010 1.005 - 1.030    pH, Urine 6.0 5.0 - 8.0    Leukocytes Trace (A) Negative    Nitrite, UA Negative Negative    Protein, POC Negative Negative mg/dL    Glucose, UA Negative Negative mg/dL    Ketones, UA Negative Negative    Urobilinogen, UA 0.2 E.U./dL Normal, 0.2 E.U./dL    Bilirubin Negative Negative    Blood, UA Negative Negative    Lot Number [id number]     Expiration Date 62,024          Bladder Scan interpretation 05/22/2023    Estimation of residual urine via BVI 3000 Verathon Bladder Scan  MA/nurse performing: Marielena Naik MA  Residual Urine: 35 ml  Indication: Hydronephrosis, unspecified hydronephrosis type    OAB (overactive bladder)   Position: Supine  Examination: Incremental scanning of the suprapubic area using 2.0 MHz transducer using copious amounts of acoustic gel.   Findings: An anechoic area was demonstrated which represented the bladder, with measurement of residual urine as noted. I inspected this myself. In that the residual urine was stable or insignificant, refer to plan for treatment and plan necessary at this time.              Assessment and Plan    Diagnoses and all orders for this visit:    1. Hydronephrosis, unspecified hydronephrosis type (Primary)  Assessment & Plan:  No further workup needed.  Her left kidney is normal and right kidney is completely nonfunctional.     Orders:  -     POC Urinalysis Dipstick, Automated  -     Bladder Scan    2. OAB (overactive bladder)  Assessment & Plan:  Continue Oxybutynin ER 5mg once a day. I will see her in 4 months.     Orders:  -     POC Urinalysis Dipstick, Automated  -     Bladder Scan    - Will follow up in 4 months with a repeat PVR check.  - Will see her at that time.         Follow Up       Return in about 4 months (around 9/22/2023).  Patient was given instructions and counseling  regarding her condition or for health maintenance advice. Please see specific information pulled into the AVS if appropriate.     Transcribed from ambient dictation for Nereyda Powers MD by Juju Cottrell.  05/22/23   11:50 EDT    Patient or patient representative verbalized consent to the visit recording.  I have personally performed the services described in this document as transcribed by the above individual, and it is both accurate and complete.  Nereyda Powers MD  5/22/2023  12:37 EDT

## 2023-05-22 NOTE — ASSESSMENT & PLAN NOTE
No further workup needed.  Her left kidney is normal and right kidney is completely nonfunctional.

## 2023-05-30 DIAGNOSIS — M32.19 LUPUS VASCULITIS: ICD-10-CM

## 2023-05-30 DIAGNOSIS — M32.9 LUPUS: ICD-10-CM

## 2023-05-30 DIAGNOSIS — I77.89 LUPUS VASCULITIS: ICD-10-CM

## 2023-05-30 RX ORDER — APREMILAST 30 MG/1
TABLET, FILM COATED ORAL
COMMUNITY
Start: 2023-05-22

## 2023-05-30 NOTE — TELEPHONE ENCOUNTER
Patient said she told Dr. Naik that she was on methotrexate.  She states that he told her Otezla would help her skin.  She states she only takes methotrexate on saturdays, this past Saturday she took both medications.  Please advise if patient needs to be on both.

## 2023-05-31 NOTE — TELEPHONE ENCOUNTER
Spoke with patient, she is aware Dr. Ordonez sent this refill in for her.  She will continue taking both medications.  She will follow up on 6/7/23.

## 2023-06-07 ENCOUNTER — LAB (OUTPATIENT)
Dept: LAB | Facility: HOSPITAL | Age: 63
End: 2023-06-07
Payer: COMMERCIAL

## 2023-06-07 ENCOUNTER — OFFICE VISIT (OUTPATIENT)
Dept: FAMILY MEDICINE CLINIC | Facility: CLINIC | Age: 63
End: 2023-06-07
Payer: COMMERCIAL

## 2023-06-07 VITALS
HEART RATE: 76 BPM | WEIGHT: 140 LBS | BODY MASS INDEX: 22.5 KG/M2 | DIASTOLIC BLOOD PRESSURE: 89 MMHG | OXYGEN SATURATION: 100 % | TEMPERATURE: 98.4 F | HEIGHT: 66 IN | SYSTOLIC BLOOD PRESSURE: 129 MMHG | RESPIRATION RATE: 14 BRPM

## 2023-06-07 DIAGNOSIS — G89.4 CHRONIC PAIN DISORDER: ICD-10-CM

## 2023-06-07 DIAGNOSIS — M79.672 BILATERAL FOOT PAIN: ICD-10-CM

## 2023-06-07 DIAGNOSIS — M32.9 LUPUS: Chronic | ICD-10-CM

## 2023-06-07 DIAGNOSIS — G89.4 CHRONIC PAIN SYNDROME: ICD-10-CM

## 2023-06-07 DIAGNOSIS — M32.9 LUPUS: ICD-10-CM

## 2023-06-07 DIAGNOSIS — I77.89 LUPUS VASCULITIS: Chronic | ICD-10-CM

## 2023-06-07 DIAGNOSIS — G57.93 NEUROPATHY OF BOTH FEET: Chronic | ICD-10-CM

## 2023-06-07 DIAGNOSIS — M79.671 BILATERAL FOOT PAIN: ICD-10-CM

## 2023-06-07 DIAGNOSIS — F51.01 PRIMARY INSOMNIA: ICD-10-CM

## 2023-06-07 DIAGNOSIS — K22.89 ESOPHAGEAL THICKENING: ICD-10-CM

## 2023-06-07 DIAGNOSIS — M80.00XD AGE-RELATED OSTEOPOROSIS WITH CURRENT PATHOLOGICAL FRACTURE WITH ROUTINE HEALING: ICD-10-CM

## 2023-06-07 DIAGNOSIS — F41.1 GAD (GENERALIZED ANXIETY DISORDER): ICD-10-CM

## 2023-06-07 DIAGNOSIS — M32.19 LUPUS VASCULITIS: Chronic | ICD-10-CM

## 2023-06-07 DIAGNOSIS — I77.89 LUPUS VASCULITIS: Primary | Chronic | ICD-10-CM

## 2023-06-07 DIAGNOSIS — M32.19 LUPUS VASCULITIS: Primary | Chronic | ICD-10-CM

## 2023-06-07 PROBLEM — IMO0002 LUPUS: Status: ACTIVE | Noted: 2023-02-27

## 2023-06-07 LAB
ALBUMIN SERPL-MCNC: 4.2 G/DL (ref 3.5–5.2)
ALBUMIN/GLOB SERPL: 1.2 G/DL
ALP SERPL-CCNC: 73 U/L (ref 39–117)
ALT SERPL W P-5'-P-CCNC: 13 U/L (ref 1–33)
ANION GAP SERPL CALCULATED.3IONS-SCNC: 11 MMOL/L (ref 5–15)
AST SERPL-CCNC: 21 U/L (ref 1–32)
BASOPHILS # BLD AUTO: 0.06 10*3/MM3 (ref 0–0.2)
BASOPHILS NFR BLD AUTO: 1.3 % (ref 0–1.5)
BILIRUB SERPL-MCNC: 0.3 MG/DL (ref 0–1.2)
BUN SERPL-MCNC: 13 MG/DL (ref 8–23)
BUN/CREAT SERPL: 13.8 (ref 7–25)
CALCIUM SPEC-SCNC: 9.9 MG/DL (ref 8.6–10.5)
CHLORIDE SERPL-SCNC: 97 MMOL/L (ref 98–107)
CO2 SERPL-SCNC: 24 MMOL/L (ref 22–29)
CREAT SERPL-MCNC: 0.94 MG/DL (ref 0.57–1)
CRP SERPL-MCNC: <0.3 MG/DL (ref 0–0.5)
DEPRECATED RDW RBC AUTO: 43 FL (ref 37–54)
EGFRCR SERPLBLD CKD-EPI 2021: 68.7 ML/MIN/1.73
EOSINOPHIL # BLD AUTO: 0.06 10*3/MM3 (ref 0–0.4)
EOSINOPHIL NFR BLD AUTO: 1.3 % (ref 0.3–6.2)
ERYTHROCYTE [DISTWIDTH] IN BLOOD BY AUTOMATED COUNT: 12.6 % (ref 12.3–15.4)
ERYTHROCYTE [SEDIMENTATION RATE] IN BLOOD: 6 MM/HR (ref 0–30)
GLOBULIN UR ELPH-MCNC: 3.4 GM/DL
GLUCOSE SERPL-MCNC: 84 MG/DL (ref 65–99)
HCT VFR BLD AUTO: 40.5 % (ref 34–46.6)
HGB BLD-MCNC: 13.9 G/DL (ref 12–15.9)
IMM GRANULOCYTES # BLD AUTO: 0.03 10*3/MM3 (ref 0–0.05)
IMM GRANULOCYTES NFR BLD AUTO: 0.6 % (ref 0–0.5)
LYMPHOCYTES # BLD AUTO: 1.16 10*3/MM3 (ref 0.7–3.1)
LYMPHOCYTES NFR BLD AUTO: 24.7 % (ref 19.6–45.3)
MCH RBC QN AUTO: 32.1 PG (ref 26.6–33)
MCHC RBC AUTO-ENTMCNC: 34.3 G/DL (ref 31.5–35.7)
MCV RBC AUTO: 93.5 FL (ref 79–97)
MONOCYTES # BLD AUTO: 0.62 10*3/MM3 (ref 0.1–0.9)
MONOCYTES NFR BLD AUTO: 13.2 % (ref 5–12)
NEUTROPHILS NFR BLD AUTO: 2.77 10*3/MM3 (ref 1.7–7)
NEUTROPHILS NFR BLD AUTO: 58.9 % (ref 42.7–76)
NRBC BLD AUTO-RTO: 0 /100 WBC (ref 0–0.2)
PLATELET # BLD AUTO: 265 10*3/MM3 (ref 140–450)
PMV BLD AUTO: 11.5 FL (ref 6–12)
POTASSIUM SERPL-SCNC: 4.4 MMOL/L (ref 3.5–5.2)
PROT SERPL-MCNC: 7.6 G/DL (ref 6–8.5)
RBC # BLD AUTO: 4.33 10*6/MM3 (ref 3.77–5.28)
SODIUM SERPL-SCNC: 132 MMOL/L (ref 136–145)
WBC NRBC COR # BLD: 4.7 10*3/MM3 (ref 3.4–10.8)

## 2023-06-07 PROCEDURE — 1159F MED LIST DOCD IN RCRD: CPT | Performed by: FAMILY MEDICINE

## 2023-06-07 PROCEDURE — 80053 COMPREHEN METABOLIC PANEL: CPT

## 2023-06-07 PROCEDURE — 85025 COMPLETE CBC W/AUTO DIFF WBC: CPT

## 2023-06-07 PROCEDURE — 1160F RVW MEDS BY RX/DR IN RCRD: CPT | Performed by: FAMILY MEDICINE

## 2023-06-07 PROCEDURE — 86140 C-REACTIVE PROTEIN: CPT

## 2023-06-07 PROCEDURE — 85652 RBC SED RATE AUTOMATED: CPT

## 2023-06-07 PROCEDURE — 36415 COLL VENOUS BLD VENIPUNCTURE: CPT

## 2023-06-07 PROCEDURE — 99214 OFFICE O/P EST MOD 30 MIN: CPT | Performed by: FAMILY MEDICINE

## 2023-06-07 NOTE — PROGRESS NOTES
"Chief Complaint  Discuss Medications (Helps with pain but makes her feel blah)    Subjective        Danielle Manuel presents to [facility]  History of Present Illness    Presents to follow-up on taking new drug we started on methotrexate folic acid following with dermatology who started on Otezla additionally to help.  She is on medicine for pain and anxiety depression additionally trying to quit smoking    Objective   Vital Signs:  /89   Pulse 76   Temp 98.4 °F (36.9 °C)   Resp 14   Ht 167.6 cm (66\")   Wt 63.5 kg (140 lb)   SpO2 100%   BMI 22.60 kg/m²   Estimated body mass index is 22.6 kg/m² as calculated from the following:    Height as of this encounter: 167.6 cm (66\").    Weight as of this encounter: 63.5 kg (140 lb).       [unfilled]    Physical Exam  Vitals reviewed.   Constitutional:       Appearance: Normal appearance. She is well-developed.   HENT:      Head: Normocephalic and atraumatic.      Right Ear: External ear normal.      Left Ear: External ear normal.      Nose: Nose normal.   Eyes:      Conjunctiva/sclera: Conjunctivae normal.      Pupils: Pupils are equal, round, and reactive to light.   Cardiovascular:      Rate and Rhythm: Normal rate.   Pulmonary:      Effort: Pulmonary effort is normal.      Breath sounds: Normal breath sounds.   Abdominal:      General: There is no distension.   Musculoskeletal:      Right knee: Effusion and bony tenderness present.      Left knee: Bony tenderness present. No effusion.   Skin:     General: Skin is warm and dry.      Comments: Significant skin lesions chronic, improving   Neurological:      General: No focal deficit present.      Mental Status: She is alert and oriented to person, place, and time. Mental status is at baseline.      Motor: Weakness present.   Psychiatric:         Mood and Affect: Mood and affect normal.         Behavior: Behavior normal.         Thought Content: Thought content " normal.         Judgment: Judgment normal.      Result Review :  The following data was reviewed by: Teofilo Ordnoez DO on 06/07/2023:  Common labs          1/27/2023    06:31 2/6/2023    11:10 4/12/2023    13:20   Common Labs   Glucose 105  86     BUN 13  7     Creatinine 0.89  0.80     Sodium 132  134     Potassium 4.5  4.6     Chloride 98  100     Calcium 8.9  9.7     Albumin  4.3     WBC  4.69  9.47    Hemoglobin  13.7  12.9    Hematocrit  41.4  37.5    Platelets  237  237    Total Cholesterol  222     Triglycerides  102     HDL Cholesterol  85     LDL Cholesterol   119     Hemoglobin A1C  5.50     Microalbumin, Urine  <1.2     Uric Acid   3.1                   Assessment and Plan   Diagnoses and all orders for this visit:    1. Lupus vasculitis (Primary)  -     Sedimentation rate, automated; Future  -     C-reactive protein; Future  -     Comprehensive metabolic panel; Future    2. Lupus  -     Sedimentation rate, automated; Future  -     C-reactive protein; Future  -     Comprehensive metabolic panel; Future    3. Neuropathy of both feet  -     Sedimentation rate, automated; Future  -     C-reactive protein; Future  -     Comprehensive metabolic panel; Future    4. Bilateral foot pain  -     Sedimentation rate, automated; Future  -     C-reactive protein; Future  -     Comprehensive metabolic panel; Future    5. Chronic pain syndrome  -     Sedimentation rate, automated; Future  -     C-reactive protein; Future  -     Comprehensive metabolic panel; Future      Medication is helping will stay on dose of 4 tablets weekly follow-up in the next 2 months sooner if indicated Labs today           Follow Up   Return in about 2 months (around 8/7/2023), or if symptoms worsen or fail to improve.  Patient was given instructions and counseling regarding her condition or for health maintenance advice. Please see specific information pulled into the AVS if appropriate.

## 2023-08-11 RX ORDER — PANTOPRAZOLE SODIUM 40 MG/1
TABLET, DELAYED RELEASE ORAL
Qty: 90 TABLET | Refills: 3 | OUTPATIENT
Start: 2023-08-11

## 2023-08-29 DIAGNOSIS — F41.9 ANXIETY DISORDER, UNSPECIFIED TYPE: Chronic | ICD-10-CM

## 2023-08-30 ENCOUNTER — CLINICAL SUPPORT (OUTPATIENT)
Dept: FAMILY MEDICINE CLINIC | Facility: CLINIC | Age: 63
End: 2023-08-30
Payer: COMMERCIAL

## 2023-08-30 DIAGNOSIS — Z51.81 MEDICATION MONITORING ENCOUNTER: Primary | ICD-10-CM

## 2023-08-30 LAB
POC AMPHETAMINES: NEGATIVE
POC BARBITURATES: NEGATIVE
POC BENZODIAZEPHINES: NEGATIVE
POC COCAINE: NEGATIVE
POC METHADONE: NEGATIVE
POC METHAMPHETAMINE SCREEN URINE: NEGATIVE
POC OPIATES: NEGATIVE
POC OXYCODONE: NEGATIVE
POC PHENCYCLIDINE: NEGATIVE
POC PROPOXYPHENE: NEGATIVE
POC THC: NEGATIVE
POC TRICYCLIC ANTIDEPRESSANTS: NEGATIVE

## 2023-08-30 RX ORDER — CLONAZEPAM 1 MG/1
TABLET ORAL
Qty: 30 TABLET | Refills: 5 | Status: SHIPPED | OUTPATIENT
Start: 2023-08-30

## 2023-08-30 NOTE — TELEPHONE ENCOUNTER
Spoke with patient, she is aware UDS and consent are needed.  She will come tomorrow to get this done.

## 2023-09-07 ENCOUNTER — TRANSCRIBE ORDERS (OUTPATIENT)
Dept: LAB | Facility: HOSPITAL | Age: 63
End: 2023-09-07
Payer: COMMERCIAL

## 2023-09-07 DIAGNOSIS — K21.9 CHALASIA OF LOWER ESOPHAGEAL SPHINCTER: ICD-10-CM

## 2023-09-07 DIAGNOSIS — I43 DILATED CARDIOMYOPATHY SECONDARY TO SYSTEMIC LUPUS ERYTHEMATOSUS: ICD-10-CM

## 2023-09-07 DIAGNOSIS — M32.19 DILATED CARDIOMYOPATHY SECONDARY TO SYSTEMIC LUPUS ERYTHEMATOSUS: ICD-10-CM

## 2023-09-07 DIAGNOSIS — E87.1 HYPOSMOLALITY SYNDROME: Primary | ICD-10-CM

## 2023-09-07 DIAGNOSIS — Z71.6 TOBACCO ABUSE COUNSELING: ICD-10-CM

## 2023-09-07 DIAGNOSIS — M15.0 PRIMARY GENERALIZED HYPERTROPHIC OSTEOARTHROSIS: ICD-10-CM

## 2023-09-11 ENCOUNTER — LAB (OUTPATIENT)
Dept: LAB | Facility: HOSPITAL | Age: 63
End: 2023-09-11
Payer: COMMERCIAL

## 2023-09-11 DIAGNOSIS — Z71.6 TOBACCO ABUSE COUNSELING: ICD-10-CM

## 2023-09-11 DIAGNOSIS — I43 DILATED CARDIOMYOPATHY SECONDARY TO SYSTEMIC LUPUS ERYTHEMATOSUS: ICD-10-CM

## 2023-09-11 DIAGNOSIS — E87.1 HYPOSMOLALITY SYNDROME: ICD-10-CM

## 2023-09-11 DIAGNOSIS — M32.19 DILATED CARDIOMYOPATHY SECONDARY TO SYSTEMIC LUPUS ERYTHEMATOSUS: ICD-10-CM

## 2023-09-11 DIAGNOSIS — M15.0 PRIMARY GENERALIZED HYPERTROPHIC OSTEOARTHROSIS: ICD-10-CM

## 2023-09-11 DIAGNOSIS — K21.9 CHALASIA OF LOWER ESOPHAGEAL SPHINCTER: ICD-10-CM

## 2023-09-11 LAB
ALBUMIN SERPL-MCNC: 4.2 G/DL (ref 3.5–5.2)
ANION GAP SERPL CALCULATED.3IONS-SCNC: 8 MMOL/L (ref 5–15)
BUN SERPL-MCNC: 6 MG/DL (ref 8–23)
BUN/CREAT SERPL: 6.5 (ref 7–25)
CALCIUM SPEC-SCNC: 9.4 MG/DL (ref 8.6–10.5)
CHLORIDE SERPL-SCNC: 99 MMOL/L (ref 98–107)
CO2 SERPL-SCNC: 26 MMOL/L (ref 22–29)
CREAT SERPL-MCNC: 0.92 MG/DL (ref 0.57–1)
CREAT UR-MCNC: 66.1 MG/DL
EGFRCR SERPLBLD CKD-EPI 2021: 70.5 ML/MIN/1.73
GLUCOSE SERPL-MCNC: 84 MG/DL (ref 65–99)
PHOSPHATE SERPL-MCNC: 4.2 MG/DL (ref 2.5–4.5)
POTASSIUM SERPL-SCNC: 4.8 MMOL/L (ref 3.5–5.2)
PROT ?TM UR-MCNC: 10.4 MG/DL
PROT/CREAT UR: 0.16 MG/G{CREAT}
SODIUM SERPL-SCNC: 133 MMOL/L (ref 136–145)
SODIUM UR-SCNC: 27 MMOL/L

## 2023-09-11 PROCEDURE — 84156 ASSAY OF PROTEIN URINE: CPT

## 2023-09-11 PROCEDURE — 82570 ASSAY OF URINE CREATININE: CPT

## 2023-09-11 PROCEDURE — 83930 ASSAY OF BLOOD OSMOLALITY: CPT

## 2023-09-11 PROCEDURE — 36415 COLL VENOUS BLD VENIPUNCTURE: CPT

## 2023-09-11 PROCEDURE — 83935 ASSAY OF URINE OSMOLALITY: CPT

## 2023-09-11 PROCEDURE — 84300 ASSAY OF URINE SODIUM: CPT

## 2023-09-11 PROCEDURE — 80069 RENAL FUNCTION PANEL: CPT

## 2023-09-11 RX ORDER — CHLORTHALIDONE 25 MG/1
TABLET ORAL
Qty: 90 TABLET | Refills: 3 | Status: SHIPPED | OUTPATIENT
Start: 2023-09-11

## 2023-09-11 RX ORDER — PROPRANOLOL HYDROCHLORIDE 80 MG/1
CAPSULE, EXTENDED RELEASE ORAL
Qty: 90 CAPSULE | Refills: 3 | Status: SHIPPED | OUTPATIENT
Start: 2023-09-11

## 2023-09-11 NOTE — TELEPHONE ENCOUNTER
Last Dispensed on 08/13/23   Last sodium level 132 on 06/07/23  Last OV 06/07/23  Do you want to refill this?

## 2023-09-12 LAB
OSMOLALITY SERPL: 283 MOSM/KG (ref 280–301)
OSMOLALITY UR: 244 MOSM/KG

## 2023-09-22 ENCOUNTER — OFFICE VISIT (OUTPATIENT)
Dept: UROLOGY | Facility: CLINIC | Age: 63
End: 2023-09-22
Payer: COMMERCIAL

## 2023-09-22 VITALS — BODY MASS INDEX: 20.73 KG/M2 | HEIGHT: 66 IN | WEIGHT: 129 LBS

## 2023-09-22 DIAGNOSIS — N32.81 OAB (OVERACTIVE BLADDER): Primary | ICD-10-CM

## 2023-09-22 LAB
BILIRUB BLD-MCNC: NEGATIVE MG/DL
CLARITY, POC: CLEAR
COLOR UR: YELLOW
EXPIRATION DATE: ABNORMAL
GLUCOSE UR STRIP-MCNC: NEGATIVE MG/DL
KETONES UR QL: NEGATIVE
LEUKOCYTE EST, POC: ABNORMAL
Lab: ABNORMAL
NITRITE UR-MCNC: NEGATIVE MG/ML
PH UR: 6.5 [PH] (ref 5–8)
PROT UR STRIP-MCNC: NEGATIVE MG/DL
RBC # UR STRIP: NEGATIVE /UL
SP GR UR: 1.01 (ref 1–1.03)
URINE VOLUME: NORMAL
UROBILINOGEN UR QL: ABNORMAL

## 2023-09-22 NOTE — PROGRESS NOTES
"Chief Complaint  OAB    Subjective          Danielle Manuel presents to [facility]    History of Present Illness  Patient has no complaints.  She states her oxybutynin is working well.  She is not having any issues emptying her bladder.  PVR today was 0 mils.    Objective   Vital Signs:   Ht 167.6 cm (66\")   Wt 58.5 kg (129 lb)   BMI 20.82 kg/m²       Physical Exam  Vitals and nursing note reviewed.   Constitutional:       Appearance: Normal appearance. She is well-developed.   Pulmonary:      Effort: Pulmonary effort is normal.      Breath sounds: Normal air entry.   Neurological:      Mental Status: She is alert and oriented to person, place, and time.      Motor: Motor function is intact.   Psychiatric:         Mood and Affect: Mood normal.         Behavior: Behavior normal.        Result Review :   The following data was reviewed by: Nereyda Powers MD on 09/22/2023:    Results for orders placed or performed in visit on 09/22/23   Bladder Scan   Result Value Ref Range    Urine Volume 0ml    POC Urinalysis Dipstick, Automated    Specimen: Urine   Result Value Ref Range    Color Yellow Yellow, Straw, Dark Yellow, Azra    Clarity, UA Clear Clear    Specific Gravity  1.010 1.005 - 1.030    pH, Urine 6.5 5.0 - 8.0    Leukocytes Small (1+) (A) Negative    Nitrite, UA Negative Negative    Protein, POC Negative Negative mg/dL    Glucose, UA Negative Negative mg/dL    Ketones, UA Negative Negative    Urobilinogen, UA 0.2 E.U./dL Normal, 0.2 E.U./dL    Bilirubin Negative Negative    Blood, UA Negative Negative    Lot Number [id number]     Expiration Date 92,024          Bladder Scan interpretation 09/22/2023    Estimation of residual urine via BVI 3000 Verathon Bladder Scan  MA/nurse performing: Marielena Naik MA  Residual Urine: 0 ml  Indication: OAB (overactive bladder)   Position: Supine  Examination: Incremental scanning of the suprapubic area using 2.0 MHz transducer using copious " amounts of acoustic gel.   Findings: An anechoic area was demonstrated which represented the bladder, with measurement of residual urine as noted. I inspected this myself. In that the residual urine was stable or insignificant, refer to plan for treatment and plan necessary at this time.            Assessment and Plan    Diagnoses and all orders for this visit:    1. OAB (overactive bladder) (Primary)  -     POC Urinalysis Dipstick, Automated  -     Bladder Scan    Follow-up in 6 months.  Continue oxybutynin ER 5 mg once a day.        Follow Up       No follow-ups on file.  Patient was given instructions and counseling regarding her condition or for health maintenance advice. Please see specific information pulled into the AVS if appropriate.

## 2024-02-19 DIAGNOSIS — M32.19 LUPUS VASCULITIS: ICD-10-CM

## 2024-02-19 DIAGNOSIS — I77.89 LUPUS VASCULITIS: ICD-10-CM

## 2024-02-19 DIAGNOSIS — M32.9 LUPUS: ICD-10-CM

## 2024-02-19 RX ORDER — METHOTREXATE 2.5 MG/1
TABLET ORAL
Qty: 24 TABLET | Refills: 5 | Status: SHIPPED | OUTPATIENT
Start: 2024-02-19

## 2024-03-04 ENCOUNTER — OFFICE VISIT (OUTPATIENT)
Dept: FAMILY MEDICINE CLINIC | Facility: CLINIC | Age: 64
End: 2024-03-04
Payer: MEDICARE

## 2024-03-04 VITALS
WEIGHT: 127 LBS | HEIGHT: 66 IN | HEART RATE: 75 BPM | TEMPERATURE: 98 F | SYSTOLIC BLOOD PRESSURE: 155 MMHG | DIASTOLIC BLOOD PRESSURE: 110 MMHG | BODY MASS INDEX: 20.41 KG/M2 | RESPIRATION RATE: 20 BRPM | OXYGEN SATURATION: 100 %

## 2024-03-04 DIAGNOSIS — E78.2 MIXED HYPERLIPIDEMIA: ICD-10-CM

## 2024-03-04 DIAGNOSIS — R23.4 CRACKED SKIN ON FEET: ICD-10-CM

## 2024-03-04 DIAGNOSIS — Z11.59 NEED FOR HEPATITIS C SCREENING TEST: ICD-10-CM

## 2024-03-04 DIAGNOSIS — G57.93 NEUROPATHY OF BOTH FEET: ICD-10-CM

## 2024-03-04 DIAGNOSIS — M32.9 SYSTEMIC LUPUS ERYTHEMATOSUS, UNSPECIFIED SLE TYPE, UNSPECIFIED ORGAN INVOLVEMENT STATUS: ICD-10-CM

## 2024-03-04 DIAGNOSIS — Z79.899 LONG TERM CURRENT USE OF IMMUNOSUPPRESSIVE DRUG: ICD-10-CM

## 2024-03-04 DIAGNOSIS — G44.86 CERVICOGENIC HEADACHE: Primary | ICD-10-CM

## 2024-03-04 DIAGNOSIS — G89.4 CHRONIC PAIN SYNDROME: ICD-10-CM

## 2024-03-04 PROCEDURE — 99214 OFFICE O/P EST MOD 30 MIN: CPT | Performed by: FAMILY MEDICINE

## 2024-03-04 PROCEDURE — 1160F RVW MEDS BY RX/DR IN RCRD: CPT | Performed by: FAMILY MEDICINE

## 2024-03-04 PROCEDURE — 1159F MED LIST DOCD IN RCRD: CPT | Performed by: FAMILY MEDICINE

## 2024-03-04 RX ORDER — ONDANSETRON 4 MG/1
4 TABLET, ORALLY DISINTEGRATING ORAL EVERY 12 HOURS PRN
Qty: 15 TABLET | Refills: 0 | Status: SHIPPED | OUTPATIENT
Start: 2024-03-04

## 2024-03-04 RX ORDER — MELOXICAM 7.5 MG/1
7.5 TABLET ORAL DAILY
COMMUNITY

## 2024-03-04 RX ORDER — TIZANIDINE 4 MG/1
4 TABLET ORAL 2 TIMES DAILY PRN
Qty: 20 TABLET | Refills: 0 | Status: SHIPPED | OUTPATIENT
Start: 2024-03-04

## 2024-03-07 ENCOUNTER — TELEPHONE (OUTPATIENT)
Dept: FAMILY MEDICINE CLINIC | Facility: CLINIC | Age: 64
End: 2024-03-07
Payer: MEDICARE

## 2024-03-07 DIAGNOSIS — I10 PRIMARY HYPERTENSION: Primary | ICD-10-CM

## 2024-03-07 RX ORDER — CLONIDINE HYDROCHLORIDE 0.2 MG/1
0.2 TABLET ORAL 3 TIMES DAILY PRN
Qty: 90 TABLET | Refills: 2 | Status: SHIPPED | OUTPATIENT
Start: 2024-03-07

## 2024-03-07 NOTE — TELEPHONE ENCOUNTER
Patient is still concerned about her BP.  It is still running high, she still has headache and did have 2 nose bleeds since you saw her.  Monday night 155-116  Tues -97  /117 (excruciating headache)  Wed /93  /97  Today 162/100 with bad headache    Please advise what to tell patient, she is not on BP meds, states she was in past but it dropped it too low.

## 2024-03-07 NOTE — TELEPHONE ENCOUNTER
I sent clonidine in to take up to 3 times a day prn for blood pressures staying above 150 systolic. I sent 0.2mg, can take if elevated advised to checking morning afternoon and evening, call and let us know if coming down after taking for the next 6-8 hours or so    If headaches continue though or worsen with headache consider going to ED or call

## 2024-03-13 NOTE — PROGRESS NOTES
"Chief Complaint  Nausea (X1 month, having random episodes of nausea.) and Headache (X1 month.)    Subjective          Danielle Manuel presents to [facility]  Nausea  Associated symptoms include headaches and nausea.   Headache      Patient presents with nausea for a month having random episodes of wanting to gag and vomit can happen sporadically multiple times a day not brought on by any foul smells anxiety or other.  Denies any changes in medicine any diarrhea constipation or bowel symptoms or changes has had some decreased p.o. intake however related to symptoms in general and headache probably from not being able to eat and drink regularly    Blood pressure mildly elevated today has been similar in the past but medications prescribed seem to be too high-powered for her even at low doses caused hypotension    Continues to suffer from skin conditions related to psoriasis and other, concerned about her feet discoloration purplish hue cold to touch but no pain, discussed Raynaud's and relation to autoimmune rheumatological disorder      Objective   Vital Signs:   BP (!) 155/110 (BP Location: Right arm, Patient Position: Sitting, Cuff Size: Adult)   Pulse 75   Temp 98 °F (36.7 °C) (Temporal)   Resp 20   Ht 167.6 cm (66\")   Wt 57.6 kg (127 lb)   SpO2 100%   BMI 20.50 kg/m²     BMI is within normal parameters. No other follow-up for BMI required.      Physical Exam  Vitals reviewed.   Constitutional:       Appearance: Normal appearance. She is well-developed.   HENT:      Head: Normocephalic and atraumatic.      Right Ear: External ear normal.      Left Ear: External ear normal.      Nose: Nose normal.   Eyes:      Conjunctiva/sclera: Conjunctivae normal.      Pupils: Pupils are equal, round, and reactive to light.   Cardiovascular:      Rate and Rhythm: Normal rate.   Pulmonary:      Effort: Pulmonary effort is normal.      Breath sounds: Normal breath sounds.   Abdominal:     "  General: There is no distension.   Feet:      Comments: Purple discoloration bilaterally, similar  to past appearances without pain or ulcers, calluses or other    Skin:     General: Skin is warm and dry.      Comments: Significant skin lesions chronic, improving   Neurological:      General: No focal deficit present.      Mental Status: She is alert and oriented to person, place, and time. Mental status is at baseline.      Motor: Weakness present.   Psychiatric:         Mood and Affect: Mood and affect normal.         Behavior: Behavior normal.         Thought Content: Thought content normal.         Judgment: Judgment normal.          Result Review :   The following data was reviewed by: Teofilo Ordonez DO on 03/04/2024:  Common labs          4/12/2023    13:20 6/7/2023    14:13 6/7/2023    14:14 9/11/2023    12:30   Common Labs   Glucose   84  84    BUN   13  6    Creatinine   0.94  0.92    Sodium   132  133    Potassium   4.4  4.8    Chloride   97  99    Calcium   9.9  9.4    Albumin   4.2  4.2    Total Bilirubin   0.3     Alkaline Phosphatase   73     AST (SGOT)   21     ALT (SGPT)   13     WBC 9.47  4.70      Hemoglobin 12.9  13.9      Hematocrit 37.5  40.5      Platelets 237  265      Uric Acid 3.1                       Assessment and Plan    Diagnoses and all orders for this visit:    1. Cervicogenic headache (Primary)  -     Comprehensive metabolic panel; Future  -     CBC (No Diff); Future  -     Urinalysis With Microscopic - Urine, Clean Catch; Future  -     tiZANidine (ZANAFLEX) 4 MG tablet; Take 1 tablet by mouth 2 (Two) Times a Day As Needed for Muscle Spasms.  Dispense: 20 tablet; Refill: 0    2. Need for hepatitis C screening test  -     Hepatitis C Antibody; Future    3. Mixed hyperlipidemia  -     Lipid panel; Future  -     TSH+Free T4; Future  -     Comprehensive metabolic panel; Future  -     CBC (No Diff); Future  -     Urinalysis With Microscopic - Urine, Clean Catch; Future    4. Long term  current use of immunosuppressive drug    5. Cracked skin on feet    6. Chronic pain syndrome    7. Neuropathy of both feet    8. Systemic lupus erythematosus, unspecified SLE type, unspecified organ involvement status    Other orders  -     ondansetron ODT (ZOFRAN-ODT) 4 MG disintegrating tablet; Place 1 tablet on the tongue Every 12 (Twelve) Hours As Needed for Nausea or Vomiting.  Dispense: 15 tablet; Refill: 0      Will order labs to recheck patient's chronic conditions as well as order other test such as lipase if indicated and imaging CT abdomen pelvis if needed if continued nausea and symptoms    Prescribed nausea medicine for patient and also something for migraine headache to help good rest this relaxer prescribed to take at night since be more tension related given the location of her headache    Follow-up later in the week during the next couple weeks if continues or worse or go to the ED, consider low-dose amlodipine or similar calcium channel blocker to help with the Raynaud's symptoms and complaints    AWV at next appt if appropriate      Follow Up   Return in about 3 days (around 3/7/2024), or if symptoms worsen or fail to improve, for Next scheduled follow up later in week if no improvement.  Patient was given instructions and counseling regarding her condition or for health maintenance advice. Please see specific information pulled into the AVS if appropriate.     Transcribed from ambient dictation for Teofilo Ordonez DO by Teofilo Ordonez DO.  03/13/24   07:39 EDT

## 2024-03-25 ENCOUNTER — TELEPHONE (OUTPATIENT)
Dept: UROLOGY | Facility: CLINIC | Age: 64
End: 2024-03-25

## 2024-03-28 NOTE — TELEPHONE ENCOUNTER
2ND CALL - CALLED PT TO OFFER R/S OF CX'D APPT 3/25 W/ SUREKHA    NO ANSWER, LMOM    NO ONE ELSE LISTED ON [facility] VERBAL

## 2024-03-29 NOTE — TELEPHONE ENCOUNTER
3RD CALL - CALLED PT TO OFFER R/S OF CX'D APPT 3/25 W/ SUREKHA    NO ANSWER, LMOM    NO ONE ELSE LISTED ON [facility] VERBAL    THREE ATTEMPTS MADE, ANYTHING ELSE TO DO?

## 2024-04-04 DIAGNOSIS — N32.81 OAB (OVERACTIVE BLADDER): Primary | ICD-10-CM

## 2024-04-04 RX ORDER — OXYBUTYNIN CHLORIDE 5 MG/1
5 TABLET, EXTENDED RELEASE ORAL DAILY
Qty: 30 TABLET | Refills: 1 | Status: SHIPPED | OUTPATIENT
Start: 2024-04-04

## 2024-04-09 ENCOUNTER — OFFICE VISIT (OUTPATIENT)
Dept: FAMILY MEDICINE CLINIC | Facility: CLINIC | Age: 64
End: 2024-04-09
Payer: MEDICARE

## 2024-04-09 ENCOUNTER — LAB (OUTPATIENT)
Dept: LAB | Facility: HOSPITAL | Age: 64
End: 2024-04-09
Payer: MEDICARE

## 2024-04-09 VITALS
SYSTOLIC BLOOD PRESSURE: 130 MMHG | OXYGEN SATURATION: 100 % | HEART RATE: 63 BPM | BODY MASS INDEX: 20.31 KG/M2 | DIASTOLIC BLOOD PRESSURE: 99 MMHG | TEMPERATURE: 97.3 F | HEIGHT: 66 IN | WEIGHT: 126.4 LBS | RESPIRATION RATE: 16 BRPM

## 2024-04-09 DIAGNOSIS — R23.4 CRACKED SKIN ON FEET: ICD-10-CM

## 2024-04-09 DIAGNOSIS — G44.86 CERVICOGENIC HEADACHE: ICD-10-CM

## 2024-04-09 DIAGNOSIS — Z79.899 LONG TERM CURRENT USE OF IMMUNOSUPPRESSIVE DRUG: ICD-10-CM

## 2024-04-09 DIAGNOSIS — I10 PRIMARY HYPERTENSION: ICD-10-CM

## 2024-04-09 DIAGNOSIS — F41.9 ANXIETY DISORDER, UNSPECIFIED TYPE: Chronic | ICD-10-CM

## 2024-04-09 DIAGNOSIS — E78.2 MIXED HYPERLIPIDEMIA: ICD-10-CM

## 2024-04-09 DIAGNOSIS — Z00.00 INITIAL MEDICARE ANNUAL WELLNESS VISIT: Primary | ICD-10-CM

## 2024-04-09 DIAGNOSIS — G89.4 CHRONIC PAIN DISORDER: ICD-10-CM

## 2024-04-09 DIAGNOSIS — M32.9 LUPUS: ICD-10-CM

## 2024-04-09 DIAGNOSIS — M80.00XD AGE-RELATED OSTEOPOROSIS WITH CURRENT PATHOLOGICAL FRACTURE WITH ROUTINE HEALING: ICD-10-CM

## 2024-04-09 DIAGNOSIS — F51.01 PRIMARY INSOMNIA: ICD-10-CM

## 2024-04-09 DIAGNOSIS — G89.4 CHRONIC PAIN SYNDROME: ICD-10-CM

## 2024-04-09 DIAGNOSIS — M32.9 SYSTEMIC LUPUS ERYTHEMATOSUS, UNSPECIFIED SLE TYPE, UNSPECIFIED ORGAN INVOLVEMENT STATUS: ICD-10-CM

## 2024-04-09 DIAGNOSIS — K22.89 ESOPHAGEAL THICKENING: ICD-10-CM

## 2024-04-09 DIAGNOSIS — Z11.59 NEED FOR HEPATITIS C SCREENING TEST: ICD-10-CM

## 2024-04-09 DIAGNOSIS — F41.1 GAD (GENERALIZED ANXIETY DISORDER): ICD-10-CM

## 2024-04-09 LAB
ALBUMIN SERPL-MCNC: 4.3 G/DL (ref 3.5–5.2)
ALBUMIN/GLOB SERPL: 1.3 G/DL
ALP SERPL-CCNC: 104 U/L (ref 39–117)
ALT SERPL W P-5'-P-CCNC: 36 U/L (ref 1–33)
ANION GAP SERPL CALCULATED.3IONS-SCNC: 8 MMOL/L (ref 5–15)
AST SERPL-CCNC: 32 U/L (ref 1–32)
BILIRUB SERPL-MCNC: 0.6 MG/DL (ref 0–1.2)
BUN SERPL-MCNC: 7 MG/DL (ref 8–23)
BUN/CREAT SERPL: 7.9 (ref 7–25)
CALCIUM SPEC-SCNC: 9.3 MG/DL (ref 8.6–10.5)
CHLORIDE SERPL-SCNC: 101 MMOL/L (ref 98–107)
CHOLEST SERPL-MCNC: 217 MG/DL (ref 0–200)
CO2 SERPL-SCNC: 27 MMOL/L (ref 22–29)
CREAT SERPL-MCNC: 0.89 MG/DL (ref 0.57–1)
DEPRECATED RDW RBC AUTO: 49.2 FL (ref 37–54)
EGFRCR SERPLBLD CKD-EPI 2021: 73.4 ML/MIN/1.73
ERYTHROCYTE [DISTWIDTH] IN BLOOD BY AUTOMATED COUNT: 14.4 % (ref 12.3–15.4)
GLOBULIN UR ELPH-MCNC: 3.2 GM/DL
GLUCOSE SERPL-MCNC: 83 MG/DL (ref 65–99)
HCT VFR BLD AUTO: 40.6 % (ref 34–46.6)
HCV AB SER DONR QL: NORMAL
HDLC SERPL-MCNC: 82 MG/DL (ref 40–60)
HGB BLD-MCNC: 13.3 G/DL (ref 12–15.9)
LDLC SERPL CALC-MCNC: 124 MG/DL (ref 0–100)
LDLC/HDLC SERPL: 1.5 {RATIO}
MCH RBC QN AUTO: 31.1 PG (ref 26.6–33)
MCHC RBC AUTO-ENTMCNC: 32.8 G/DL (ref 31.5–35.7)
MCV RBC AUTO: 94.9 FL (ref 79–97)
PLATELET # BLD AUTO: 153 10*3/MM3 (ref 140–450)
PMV BLD AUTO: 13 FL (ref 6–12)
POTASSIUM SERPL-SCNC: 4.2 MMOL/L (ref 3.5–5.2)
PROT SERPL-MCNC: 7.5 G/DL (ref 6–8.5)
RBC # BLD AUTO: 4.28 10*6/MM3 (ref 3.77–5.28)
SODIUM SERPL-SCNC: 136 MMOL/L (ref 136–145)
T4 FREE SERPL-MCNC: 1.42 NG/DL (ref 0.93–1.7)
TRIGL SERPL-MCNC: 62 MG/DL (ref 0–150)
TSH SERPL DL<=0.05 MIU/L-ACNC: 0.76 UIU/ML (ref 0.27–4.2)
VLDLC SERPL-MCNC: 11 MG/DL (ref 5–40)
WBC NRBC COR # BLD AUTO: 4.43 10*3/MM3 (ref 3.4–10.8)

## 2024-04-09 PROCEDURE — 84439 ASSAY OF FREE THYROXINE: CPT

## 2024-04-09 PROCEDURE — 80061 LIPID PANEL: CPT

## 2024-04-09 PROCEDURE — 86803 HEPATITIS C AB TEST: CPT

## 2024-04-09 PROCEDURE — 85027 COMPLETE CBC AUTOMATED: CPT

## 2024-04-09 PROCEDURE — 80053 COMPREHEN METABOLIC PANEL: CPT

## 2024-04-09 PROCEDURE — 36415 COLL VENOUS BLD VENIPUNCTURE: CPT

## 2024-04-09 PROCEDURE — 84443 ASSAY THYROID STIM HORMONE: CPT

## 2024-04-09 RX ORDER — AMLODIPINE BESYLATE 2.5 MG/1
2.5 TABLET ORAL DAILY
Qty: 30 TABLET | Refills: 5 | Status: SHIPPED | OUTPATIENT
Start: 2024-04-09

## 2024-04-09 RX ORDER — CLONAZEPAM 1 MG/1
1 TABLET ORAL NIGHTLY PRN
Qty: 30 TABLET | Refills: 5 | Status: SHIPPED | OUTPATIENT
Start: 2024-04-09

## 2024-04-09 NOTE — PROGRESS NOTES
The ABCs of the Annual Wellness Visit  [facility]    Subjective     Danielle Manuel is a 63 y.o. female who presents for a  Welcome to Medicare Visit.    The following portions of the patient's history were reviewed and   updated as appropriate: allergies, current medications, past family history, past medical history, past social history, past surgical history, and problem list.     Compared to one year ago, the patient feels her physical   health is the same.    Compared to one year ago, the patient feels her mental   health is the same.    Recent Hospitalizations:  She was not admitted to the hospital during the last year.       Current Medical Providers:  Patient Care Team:  Teofilo Ordonez DO as PCP - General (Family Medicine)  Seamus Naik MD as Consulting Physician (Dermatology)  Vivienne Mcrae PA as Physician Assistant (Pain Medicine)  Jensen Melvin MD as Consulting Physician (Pain Medicine)  Kevin Oneil DPM as Consulting Physician (Podiatry)  Kell Lau PA (Physician Assistant)  Purnima Bazan APRN as Nurse Practitioner (Nurse Practitioner)  Kodak Rey MD as Consulting Physician (Nephrology)  Cleo Dykes APRN as Nurse Practitioner (Pain Medicine)  Nereyda Powers MD as Consulting Physician (Urology)    Outpatient Medications Prior to Visit   Medication Sig Dispense Refill    calcium carbonate (OS-PATTIE) 1250 (500 Ca) MG tablet Take 1 tablet by mouth Daily As Needed for Indigestion or Heartburn.      chlorhexidine (PERIDEX) 0.12 % solution Apply 15 mL to the mouth or throat 2 (Two) Times a Day As Needed.      clobetasol (TEMOVATE) 0.05 % ointment Apply 1 application topically to the appropriate area as directed 2 (Two) Times a Day. 60 g 0    cloNIDine (Catapres) 0.2 MG tablet Take 1 tablet by mouth 3 (Three) Times a Day As Needed for High Blood Pressure (Systolic above 150). 90 tablet 2    desonide (DESOWEN)  0.05 % ointment Apply 1 Application topically to the appropriate area as directed 3 (Three) Times a Day As Needed.      folic acid (FOLVITE) 1 MG tablet Take 1 tablet by mouth Daily. 90 tablet 3    gabapentin (NEURONTIN) 300 MG capsule Take 1 capsule by mouth 3 (Three) Times a Day. 90 capsule 1    meloxicam (MOBIC) 7.5 MG tablet Take 1 tablet by mouth Daily.      methotrexate 2.5 MG tablet TAKE TWO TABLETS BY MOUTH ONCE WEEKLY FOR 28 DAYS, THEN TAKE FOUR TABLETS BY MOUTH ONCE WEEKLY FOR 28 DAYS **FOLLOW UP IN 4 TO 6 WEEKS AFTER STARTING** 24 tablet 5    Omega-3 Fatty Acids (fish oil) 1000 MG capsule capsule Take 1 capsule by mouth Daily With Breakfast.      ondansetron ODT (ZOFRAN-ODT) 4 MG disintegrating tablet Place 1 tablet on the tongue Every 12 (Twelve) Hours As Needed for Nausea or Vomiting. 15 tablet 0    Otezla 30 MG tablet       tiZANidine (ZANAFLEX) 4 MG tablet Take 1 tablet by mouth 2 (Two) Times a Day As Needed for Muscle Spasms. 20 tablet 0    traMADol (ULTRAM) 50 MG tablet Take 1 tablet by mouth Daily As Needed for Moderate Pain . 30 tablet 2    vitamin D (ERGOCALCIFEROL) 1.25 MG (92927 UT) capsule capsule Take 1 capsule by mouth Every 7 (Seven) Days.      clonazePAM (KlonoPIN) 1 MG tablet TAKE ONE TABLET BY MOUTH AT NIGHT AS NEEDED FOR ANXIETY 30 tablet 5    oxybutynin XL (DITROPAN-XL) 5 MG 24 hr tablet Take 1 tablet by mouth Daily. Needs an appointment for additional refills 30 tablet 1     No facility-administered medications prior to visit.       Opioid medication/s are on active medication list.  and I have evaluated her active treatment plan and pain score trends (see table).  There were no vitals filed for this visit.  I have reviewed the chart for potential of high risk medication and harmful drug interactions in the elderly.          Aspirin is not on active medication list.  Aspirin use is not indicated based on review of current medical condition/s. Risk of harm outweighs potential benefits.  " .    Patient Active Problem List   Diagnosis    Anxiety disorder    Mixed hyperlipidemia    Insomnia, unspecified    Post menopausal syndrome    Vitamin D deficiency    Age-related osteoporosis without current pathological fracture    Systemic lupus erythematosus, unspecified    Chronic pain syndrome    Cervical radiculopathy    Cracked skin on feet    Cellulitis of left lower extremity    Lupus vasculitis    Encounter for long-term (current) use of insulin    Idiopathic neuropathy    Emphysema, unspecified    Immunosuppression due to drug therapy    Compression fracture of T9 vertebra with routine healing    Pulmonary nodule, left    Esophageal thickening    Abnormal finding on GI tract imaging    DDD (degenerative disc disease), cervical    Palpitations    Long term current use of therapeutic drug    Myofascial pain    Hyponatremia    Gastroesophageal reflux disease without esophagitis    Body mass index (BMI) of 22.0-22.9 in adult    Bilateral foot pain    Swelling of both lower extremities    Hydronephrosis    Neuropathy of both feet    OAB (overactive bladder)    Idiopathic osteoarthritis    Tobacco abuse counseling    Gastroesophageal reflux disease    Hyponatremia    Lupus    Chronic pain of both knees     Advance Care Planning   Advance Care Planning     Advance Directive is on file.  ACP discussion was declined by the patient. Patient has an advance directive in EMR which is still valid.        Objective   Vitals:    04/09/24 1314   BP: 130/99   BP Location: Left arm   Patient Position: Sitting   Cuff Size: Adult   Pulse: 63   Resp: 16   Temp: 97.3 °F (36.3 °C)   SpO2: 100%   Weight: 57.3 kg (126 lb 6.4 oz)   Height: 167.6 cm (66\")     Estimated body mass index is 20.4 kg/m² as calculated from the following:    Height as of this encounter: 167.6 cm (66\").    Weight as of this encounter: 57.3 kg (126 lb 6.4 oz).    BMI is within normal parameters. No other follow-up for BMI required.      Does the patient " have evidence of cognitive impairment?   No         Procedures       HEALTH RISK ASSESSMENT    Smoking Status:  Social History     Tobacco Use   Smoking Status Every Day    Current packs/day: 0.50    Average packs/day: 0.5 packs/day for 38.3 years (19.1 ttl pk-yrs)    Types: Cigarettes    Start date: 1986    Passive exposure: Past   Smokeless Tobacco Never     Alcohol Consumption:  Social History     Substance and Sexual Activity   Alcohol Use Not Currently    Alcohol/week: 2.0 standard drinks of alcohol    Types: 2 Cans of beer per week    Comment: CURRENT SOME DAY, DRINKS LESS THAN 1 DRINK PER DAY, HAS BEEN DRINKING FOR 21-30 YEARS        Fall Risk Screen:    CONSTANTIN Fall Risk Assessment was completed, and patient is at MODERATE risk for falls. Assessment completed on:2024    Depression Screen:       2024     1:09 PM   PHQ-2/PHQ-9 Depression Screening   Little Interest or Pleasure in Doing Things 0-->not at all   Feeling Down, Depressed or Hopeless 0-->not at all   PHQ-9: Brief Depression Severity Measure Score 0       Health Habits and Functional and Cognitive Screenin/9/2024     1:21 PM   Functional & Cognitive Status   Do you have difficulty preparing food and eating? No   Do you have difficulty bathing yourself, getting dressed or grooming yourself? No   Do you have difficulty using the toilet? No   Do you have difficulty moving around from place to place? No   Do you have trouble with steps or getting out of a bed or a chair? No   Current Diet Well Balanced Diet   Dental Exam Up to date   Eye Exam Up to date   Exercise (times per week) 7 times per week   Current Exercises Include Yard Work;House Cleaning   Do you need help using the phone?  No   Are you deaf or do you have serious difficulty hearing?  No   Do you need help to go to places out of walking distance? No   Do you need help shopping? No   Do you need help preparing meals?  No   Do you need help with housework?  No   Do you need  help with laundry? No   Do you need help taking your medications? No   Do you need help managing money? No   Do you ever drive or ride in a car without wearing a seat belt? No   Have you felt unusual stress, anger or loneliness in the last month? No   Who do you live with? Alone   If you need help, do you have trouble finding someone available to you? No   Have you been bothered in the last four weeks by sexual problems? No   Do you have difficulty concentrating, remembering or making decisions? No       Visual Acuity:    Vision Screening    Right eye Left eye Both eyes   Without correction 20/50 20/50 20/30   With correction          Age-appropriate Screening Schedule:  Refer to the list below for future screening recommendations based on patient's age, sex and/or medical conditions. Orders for these recommended tests are listed in the plan section. The patient has been provided with a written plan.    Health Maintenance   Topic Date Due    HEPATITIS C SCREENING  Never done    LIPID PANEL  02/06/2024    COVID-19 Vaccine (4 - 2023-24 season) 06/29/2024 (Originally 9/1/2023)    RSV Vaccine - Adults (1 - 1-dose 60+ series) 03/04/2025 (Originally 5/10/2021)    Pneumococcal Vaccine 0-64 (1 of 2 - PCV) 03/04/2025 (Originally 5/10/1966)    TDAP/TD VACCINES (1 - Tdap) 03/04/2025 (Originally 5/10/1980)    ZOSTER VACCINE (1 of 2) 04/09/2025 (Originally 5/10/1980)    INFLUENZA VACCINE  08/01/2024    COLORECTAL CANCER SCREENING  04/02/2025    ANNUAL WELLNESS VISIT  04/09/2025    MAMMOGRAM  05/16/2025    DXA SCAN  05/16/2025    LUNG CANCER SCREENING  Discontinued        CMS Preventative Services Quick Reference  Risk Factors Identified During Encounter    reviewed  The above risks/problems have been discussed with the patient.  Pertinent information has been shared with the patient in the After Visit Summary.    Follow Up:   Initial Medicare Visit in one year    An After Visit Summary and PPPS were made available to the  "patient.      Additional E&M Note during same encounter follows:  Patient has multiple medical problems which are significant and separately identifiable that require additional work above and beyond the Medicare Wellness Visit.      Chief Complaint  Welcome To Medicare and Hypertension (Having issues with regulating blood pressure on new medication. Causing dizziness and headaches. )    Subjective        HPI  Danielle Manuel is also being seen today for AWV and below    Patient presents for AWV as well as follow-up on blood pressure, was elevated last visit 3//2024 at 155/110 has been running similar at home blood pressures with most part have been really well-controlled and started to go up last year or 2 and medicines are prescribed and started making blood pressure too low she will been hesitant to restart any blood pressure medicines with her and today her blood pressure is 130/99 other than before but still mildly elevated discussed last visit starting amlodipine vs clonidine low-dose if needed     Patient's other chronic conditions including chronic arthritis lupus psoriasis on several medications for these conditions overall stable    Last labs were 9/2023 we have reviewed and ordered labs to be repeated for patient to be done today to monitor chronic conditions and medication she takes it can be immunosuppressant and as well as monitor kidney function inflammatory markers and others         Objective   Vital Signs:  /99 (BP Location: Left arm, Patient Position: Sitting, Cuff Size: Adult)   Pulse 63   Temp 97.3 °F (36.3 °C)   Resp 16   Ht 167.6 cm (66\")   Wt 57.3 kg (126 lb 6.4 oz)   SpO2 100%   BMI 20.40 kg/m²     Physical Exam  Vitals reviewed.   Constitutional:       Appearance: Normal appearance. She is well-developed.   HENT:      Head: Normocephalic and atraumatic.      Right Ear: External ear normal.      Left Ear: External ear normal.      Nose: Nose normal.   Eyes:      " Conjunctiva/sclera: Conjunctivae normal.      Pupils: Pupils are equal, round, and reactive to light.   Cardiovascular:      Rate and Rhythm: Normal rate.   Pulmonary:      Effort: Pulmonary effort is normal.      Breath sounds: Normal breath sounds.   Abdominal:      General: There is no distension.   Feet:      Comments: Purple discoloration bilaterally, similar  to past appearances without pain or ulcers, calluses or other    Skin:     General: Skin is warm and dry.      Comments: Significant skin lesions chronic, improving   Neurological:      General: No focal deficit present.      Mental Status: She is alert and oriented to person, place, and time. Mental status is at baseline.      Motor: Weakness present.   Psychiatric:         Mood and Affect: Mood and affect normal.         Behavior: Behavior normal.         Thought Content: Thought content normal.         Judgment: Judgment normal.          The following data was reviewed by: Teofilo Ordonez DO on 04/09/2024:  Common labs          6/7/2023    14:13 6/7/2023    14:14 9/11/2023    12:30   Common Labs   Glucose  84  84    BUN  13  6    Creatinine  0.94  0.92    Sodium  132  133    Potassium  4.4  4.8    Chloride  97  99    Calcium  9.9  9.4    Albumin  4.2  4.2    Total Bilirubin  0.3     Alkaline Phosphatase  73     AST (SGOT)  21     ALT (SGPT)  13     WBC 4.70      Hemoglobin 13.9      Hematocrit 40.5      Platelets 265                Assessment and Plan   Diagnoses and all orders for this visit:    1. Initial Medicare annual wellness visit (Primary)    2. Anxiety disorder, unspecified type  Comments:  Stable; LU and AYLA reviewed and appropriate. Refill Klonopin 1mg daily prn #30/0. Refill Celexa . Follow up in 3mths.  Orders:  -     clonazePAM (KlonoPIN) 1 MG tablet; Take 1 tablet by mouth At Night As Needed for Anxiety.  Dispense: 30 tablet; Refill: 5    3. Primary hypertension    4. Mixed hyperlipidemia    5. Long term current use of  immunosuppressive drug    6. Cracked skin on feet    7. Systemic lupus erythematosus, unspecified SLE type, unspecified organ involvement status    8. Chronic pain syndrome    9. Cervicogenic headache      Reviewed AWV recommendations and ordered as appropriate, follow up annually for review, sooner if concerns    No depression, falls, dementia symptoms or other  Risk and home safety assessment good    Followed up on chronic conditions and ordered refills, appropriate monitoring labs additionally as needed every 6 months or sooner if indicated, controlled stable    Follow up at least every 3-6 months for chronic conditions and as scheduled with appropriate specialists as indicated otherwise    Advised patient to get labs today and continue to monitor BP at home, change medicine for BP if causing symptoms lightheaded and headache, discussed amlodipine at last visit if remains just above goal            Follow Up   No follow-ups on file.  Patient was given instructions and counseling regarding her condition or for health maintenance advice. Please see specific information pulled into the AVS if appropriate.

## 2024-04-10 ENCOUNTER — LAB (OUTPATIENT)
Dept: LAB | Facility: HOSPITAL | Age: 64
End: 2024-04-10
Payer: MEDICARE

## 2024-04-10 LAB
BACTERIA UR QL AUTO: NORMAL /HPF
BILIRUB UR QL STRIP: NEGATIVE
CLARITY UR: CLEAR
COLOR UR: YELLOW
GLUCOSE UR STRIP-MCNC: NEGATIVE MG/DL
HGB UR QL STRIP.AUTO: NEGATIVE
HYALINE CASTS UR QL AUTO: NORMAL /LPF
KETONES UR QL STRIP: NEGATIVE
LEUKOCYTE ESTERASE UR QL STRIP.AUTO: NEGATIVE
NITRITE UR QL STRIP: NEGATIVE
PH UR STRIP.AUTO: 7.5 [PH] (ref 5–8)
PROT UR QL STRIP: ABNORMAL
RBC # UR STRIP: NORMAL /HPF
REF LAB TEST METHOD: NORMAL
SP GR UR STRIP: 1.01 (ref 1–1.03)
SQUAMOUS #/AREA URNS HPF: NORMAL /HPF
UROBILINOGEN UR QL STRIP: ABNORMAL
WBC # UR STRIP: NORMAL /HPF

## 2024-04-10 PROCEDURE — 81001 URINALYSIS AUTO W/SCOPE: CPT

## 2024-04-26 DIAGNOSIS — M32.9 LUPUS: ICD-10-CM

## 2024-04-26 DIAGNOSIS — M32.19 LUPUS VASCULITIS: ICD-10-CM

## 2024-04-26 DIAGNOSIS — I77.89 LUPUS VASCULITIS: ICD-10-CM

## 2024-04-26 RX ORDER — FOLIC ACID 1 MG/1
1000 TABLET ORAL DAILY
Qty: 90 TABLET | Refills: 3 | Status: SHIPPED | OUTPATIENT
Start: 2024-04-26

## 2024-04-30 ENCOUNTER — OFFICE VISIT (OUTPATIENT)
Dept: FAMILY MEDICINE CLINIC | Facility: CLINIC | Age: 64
End: 2024-04-30
Payer: MEDICARE

## 2024-04-30 VITALS
HEIGHT: 66 IN | WEIGHT: 125.8 LBS | BODY MASS INDEX: 20.22 KG/M2 | OXYGEN SATURATION: 100 % | DIASTOLIC BLOOD PRESSURE: 84 MMHG | HEART RATE: 58 BPM | SYSTOLIC BLOOD PRESSURE: 129 MMHG | TEMPERATURE: 98.7 F

## 2024-04-30 DIAGNOSIS — G44.86 CERVICOGENIC HEADACHE: Chronic | ICD-10-CM

## 2024-04-30 DIAGNOSIS — I77.89 LUPUS VASCULITIS: ICD-10-CM

## 2024-04-30 DIAGNOSIS — G89.4 CHRONIC PAIN SYNDROME: ICD-10-CM

## 2024-04-30 DIAGNOSIS — I10 PRIMARY HYPERTENSION: Primary | Chronic | ICD-10-CM

## 2024-04-30 DIAGNOSIS — S03.40XA SPRAIN OF TEMPOROMANDIBULAR JOINT, INITIAL ENCOUNTER: ICD-10-CM

## 2024-04-30 DIAGNOSIS — M32.19 LUPUS VASCULITIS: ICD-10-CM

## 2024-04-30 DIAGNOSIS — R68.2 DRY MOUTH: ICD-10-CM

## 2024-04-30 PROCEDURE — 99214 OFFICE O/P EST MOD 30 MIN: CPT | Performed by: FAMILY MEDICINE

## 2024-04-30 PROCEDURE — 1160F RVW MEDS BY RX/DR IN RCRD: CPT | Performed by: FAMILY MEDICINE

## 2024-04-30 PROCEDURE — 1159F MED LIST DOCD IN RCRD: CPT | Performed by: FAMILY MEDICINE

## 2024-04-30 PROCEDURE — 3074F SYST BP LT 130 MM HG: CPT | Performed by: FAMILY MEDICINE

## 2024-04-30 PROCEDURE — 3079F DIAST BP 80-89 MM HG: CPT | Performed by: FAMILY MEDICINE

## 2024-04-30 RX ORDER — CLONIDINE HYDROCHLORIDE 0.2 MG/1
0.2 TABLET ORAL AS NEEDED
COMMUNITY
End: 2024-04-30 | Stop reason: SDUPTHER

## 2024-04-30 RX ORDER — CLONIDINE HYDROCHLORIDE 0.2 MG/1
0.2 TABLET ORAL 2 TIMES DAILY PRN
Qty: 60 TABLET | Refills: 5 | Status: SHIPPED | OUTPATIENT
Start: 2024-04-30

## 2024-04-30 NOTE — PROGRESS NOTES
"Chief Complaint  Hypertension (Check after new meds )    Subjective        Danielle Manuel presents to [facility]  History of Present Illness    Presents to follow-up on hypertension amlodipine seem to not help much started back taking clonidine as needed for blood pressure feels her with headaches has improved to goal less than 120-130    Doing better overall still having dry mouth some chronic headaches already related to hypertension could be more TMJ and neck pain chronic pain related issues related to psoriasis arthritis and other    Reviewed labs medications with patient refilled as appropriate continue to take as discussed    Objective   Vital Signs:  /84 (BP Location: Right arm, Patient Position: Sitting, Cuff Size: Adult)   Pulse 58   Temp 98.7 °F (37.1 °C)   Ht 167.6 cm (66\")   Wt 57.1 kg (125 lb 12.8 oz)   SpO2 100%   BMI 20.30 kg/m²   Estimated body mass index is 20.3 kg/m² as calculated from the following:    Height as of this encounter: 167.6 cm (66\").    Weight as of this encounter: 57.1 kg (125 lb 12.8 oz).       BMI is within normal parameters. No other follow-up for BMI required.      Physical Exam  Vitals reviewed.   Constitutional:       Appearance: Normal appearance. She is well-developed.   HENT:      Head: Normocephalic and atraumatic.      Right Ear: External ear normal.      Left Ear: External ear normal.      Nose: Nose normal.   Eyes:      Conjunctiva/sclera: Conjunctivae normal.      Pupils: Pupils are equal, round, and reactive to light.   Cardiovascular:      Rate and Rhythm: Normal rate.   Pulmonary:      Effort: Pulmonary effort is normal.      Breath sounds: Normal breath sounds.   Abdominal:      General: There is no distension.   Feet:      Comments: Purple discoloration bilaterally, similar  to past appearances without pain or ulcers, calluses or other    Skin:     General: Skin is warm and dry.      Comments: Significant skin " lesions chronic, improving   Neurological:      General: No focal deficit present.      Mental Status: She is alert and oriented to person, place, and time. Mental status is at baseline.      Motor: Weakness present.   Psychiatric:         Mood and Affect: Mood and affect normal.         Behavior: Behavior normal.         Thought Content: Thought content normal.         Judgment: Judgment normal.        Result Review :    The following data was reviewed by: Teofilo Ordonez DO on 04/30/2024:  Common labs          6/7/2023    14:13 6/7/2023    14:14 9/11/2023    12:30 4/9/2024    14:26   Common Labs   Glucose  84  84  83    BUN  13  6  7    Creatinine  0.94  0.92  0.89    Sodium  132  133  136    Potassium  4.4  4.8  4.2    Chloride  97  99  101    Calcium  9.9  9.4  9.3    Albumin  4.2  4.2  4.3    Total Bilirubin  0.3   0.6    Alkaline Phosphatase  73   104    AST (SGOT)  21   32    ALT (SGPT)  13   36    WBC 4.70    4.43    Hemoglobin 13.9    13.3    Hematocrit 40.5    40.6    Platelets 265    153    Total Cholesterol    217    Triglycerides    62    HDL Cholesterol    82    LDL Cholesterol     124                   Assessment and Plan     Diagnoses and all orders for this visit:    1. Primary hypertension (Primary)    2. Lupus vasculitis    3. Chronic pain syndrome    4. Cervicogenic headache    5. Sprain of temporomandibular joint, initial encounter    6. Dry mouth    Other orders  -     cloNIDine (CATAPRES) 0.2 MG tablet; Take 1 tablet by mouth 2 (Two) Times a Day As Needed for High Blood Pressure.  Dispense: 60 tablet; Refill: 5      Tension headache  Bp improved with prn clonidine  Not taking amlodipine  Increasing water intake  Taking some medicines as tolerated or prn  Overall feels better  Labs reviewed from 4/2024 urine good though has complaints of dark urine no dysuria  Other labs overall good blood count cmp    Tmj symptoms could be related to headaches as well, has tizanidine  Monitor labs every 3 months  or so and BP at home             Follow Up     Return in about 3 months (around 7/30/2024), or if symptoms worsen or fail to improve, for Next scheduled follow up, Recheck.  Patient was given instructions and counseling regarding her condition or for health maintenance advice. Please see specific information pulled into the AVS if appropriate.

## 2024-05-13 ENCOUNTER — OFFICE VISIT (OUTPATIENT)
Dept: FAMILY MEDICINE CLINIC | Facility: CLINIC | Age: 64
End: 2024-05-13
Payer: MEDICARE

## 2024-05-13 VITALS
HEART RATE: 66 BPM | TEMPERATURE: 98.4 F | WEIGHT: 126.4 LBS | OXYGEN SATURATION: 100 % | DIASTOLIC BLOOD PRESSURE: 88 MMHG | BODY MASS INDEX: 20.31 KG/M2 | RESPIRATION RATE: 18 BRPM | SYSTOLIC BLOOD PRESSURE: 124 MMHG | HEIGHT: 66 IN

## 2024-05-13 DIAGNOSIS — G44.52 NEW DAILY PERSISTENT HEADACHE: Primary | ICD-10-CM

## 2024-05-13 DIAGNOSIS — I10 PRIMARY HYPERTENSION: Chronic | ICD-10-CM

## 2024-05-13 PROCEDURE — 1160F RVW MEDS BY RX/DR IN RCRD: CPT | Performed by: FAMILY MEDICINE

## 2024-05-13 PROCEDURE — 3074F SYST BP LT 130 MM HG: CPT | Performed by: FAMILY MEDICINE

## 2024-05-13 PROCEDURE — 3079F DIAST BP 80-89 MM HG: CPT | Performed by: FAMILY MEDICINE

## 2024-05-13 PROCEDURE — 1126F AMNT PAIN NOTED NONE PRSNT: CPT | Performed by: FAMILY MEDICINE

## 2024-05-13 PROCEDURE — 99213 OFFICE O/P EST LOW 20 MIN: CPT | Performed by: FAMILY MEDICINE

## 2024-05-13 PROCEDURE — 96372 THER/PROPH/DIAG INJ SC/IM: CPT | Performed by: FAMILY MEDICINE

## 2024-05-13 PROCEDURE — 1159F MED LIST DOCD IN RCRD: CPT | Performed by: FAMILY MEDICINE

## 2024-05-13 RX ORDER — AMLODIPINE BESYLATE 2.5 MG/1
2.5 TABLET ORAL DAILY
COMMUNITY
Start: 2024-05-07 | End: 2024-05-13

## 2024-05-13 RX ORDER — OXYBUTYNIN CHLORIDE 5 MG/1
5 TABLET, EXTENDED RELEASE ORAL DAILY
COMMUNITY
Start: 2024-05-01

## 2024-05-13 RX ORDER — KETOROLAC TROMETHAMINE 30 MG/ML
60 INJECTION, SOLUTION INTRAMUSCULAR; INTRAVENOUS ONCE
Status: COMPLETED | OUTPATIENT
Start: 2024-05-13 | End: 2024-05-13

## 2024-05-13 RX ORDER — PROPRANOLOL HCL 60 MG
60 CAPSULE, EXTENDED RELEASE 24HR ORAL DAILY
Qty: 30 CAPSULE | Refills: 5 | Status: SHIPPED | OUTPATIENT
Start: 2024-05-13

## 2024-05-13 RX ADMIN — KETOROLAC TROMETHAMINE 60 MG: 30 INJECTION, SOLUTION INTRAMUSCULAR; INTRAVENOUS at 13:51

## 2024-05-13 NOTE — PROGRESS NOTES
"Chief Complaint  Headache (Have gotten worse since last visit. ), Hypertension (Only BP medication currently on is Clonidine ), and Nicotine Dependence    Subjective          Danielle Manuel presents to [facility]  Headache  Hypertension  Associated symptoms include headaches.   Nicotine Dependence        Patient presents with increasing blood pressure issues and headaches that are worse happening more frequently lasting several days    Objective   Vital Signs:   /88 (BP Location: Right arm, Patient Position: Sitting, Cuff Size: Adult)   Pulse 66   Temp 98.4 °F (36.9 °C)   Resp 18   Ht 167.6 cm (66\")   Wt 57.3 kg (126 lb 6.4 oz)   SpO2 100%   BMI 20.40 kg/m²     BMI is within normal parameters. No other follow-up for BMI required.      Physical Exam  Vitals reviewed.   Constitutional:       Appearance: Normal appearance. She is well-developed.   HENT:      Head: Normocephalic and atraumatic.      Right Ear: External ear normal.      Left Ear: External ear normal.      Nose: Nose normal.   Eyes:      Conjunctiva/sclera: Conjunctivae normal.      Pupils: Pupils are equal, round, and reactive to light.   Cardiovascular:      Rate and Rhythm: Normal rate.   Pulmonary:      Effort: Pulmonary effort is normal.      Breath sounds: Normal breath sounds.   Abdominal:      General: There is no distension.   Feet:      Comments: Purple discoloration bilaterally, similar  to past appearances without pain or ulcers, calluses or other    Skin:     General: Skin is warm and dry.      Comments: Significant skin lesions chronic, improving   Neurological:      Mental Status: She is alert and oriented to person, place, and time. Mental status is at baseline.   Psychiatric:         Mood and Affect: Mood and affect normal.         Behavior: Behavior normal.         Thought Content: Thought content normal.         Judgment: Judgment normal.          Result Review :   The following data was " reviewed by: Teofilo Ordonez DO on 05/13/2024:  Common labs          6/7/2023    14:13 6/7/2023    14:14 9/11/2023    12:30 4/9/2024    14:26   Common Labs   Glucose  84  84  83    BUN  13  6  7    Creatinine  0.94  0.92  0.89    Sodium  132  133  136    Potassium  4.4  4.8  4.2    Chloride  97  99  101    Calcium  9.9  9.4  9.3    Albumin  4.2  4.2  4.3    Total Bilirubin  0.3   0.6    Alkaline Phosphatase  73   104    AST (SGOT)  21   32    ALT (SGPT)  13   36    WBC 4.70    4.43    Hemoglobin 13.9    13.3    Hematocrit 40.5    40.6    Platelets 265    153    Total Cholesterol    217    Triglycerides    62    HDL Cholesterol    82    LDL Cholesterol     124                    Assessment and Plan    Diagnoses and all orders for this visit:    1. New daily persistent headache (Primary)  -     CT Head Without Contrast; Future  -     ketorolac (TORADOL) injection 60 mg    2. Primary hypertension    Other orders  -     propranolol LA (INDERAL LA) 60 MG 24 hr capsule; Take 1 capsule by mouth Daily.  Dispense: 30 capsule; Refill: 5      Will get CT head to evaluate patient's persistent daily headaches Toradol injection today to help with migraines propranolol started to help better manage blood pressure that could be related as well as management migraines and headaches that are persistent and follow-up no improvement in the next 2 weeks sooner if indicated      Follow Up   Return in about 2 weeks (around 5/27/2024), or if symptoms worsen or fail to improve, for Next scheduled follow up, Recheck.  Patient was given instructions and counseling regarding her condition or for health maintenance advice. Please see specific information pulled into the AVS if appropriate.     Transcribed from ambient dictation for Teofilo Ordonez DO by Teofilo Ordonez DO.  05/13/24   15:03 EDT

## 2024-05-28 ENCOUNTER — OFFICE VISIT (OUTPATIENT)
Dept: FAMILY MEDICINE CLINIC | Facility: CLINIC | Age: 64
End: 2024-05-28
Payer: MEDICARE

## 2024-05-28 VITALS
HEART RATE: 66 BPM | HEIGHT: 66 IN | DIASTOLIC BLOOD PRESSURE: 102 MMHG | RESPIRATION RATE: 18 BRPM | SYSTOLIC BLOOD PRESSURE: 146 MMHG | TEMPERATURE: 98 F | WEIGHT: 123.4 LBS | OXYGEN SATURATION: 100 % | BODY MASS INDEX: 19.83 KG/M2

## 2024-05-28 DIAGNOSIS — M32.19 LUPUS VASCULITIS: Primary | Chronic | ICD-10-CM

## 2024-05-28 DIAGNOSIS — M54.12 CERVICAL RADICULOPATHY: ICD-10-CM

## 2024-05-28 DIAGNOSIS — G44.86 CERVICOGENIC HEADACHE: Chronic | ICD-10-CM

## 2024-05-28 DIAGNOSIS — I77.89 LUPUS VASCULITIS: Primary | Chronic | ICD-10-CM

## 2024-05-28 DIAGNOSIS — F41.9 ANXIETY DISORDER, UNSPECIFIED TYPE: Chronic | ICD-10-CM

## 2024-05-28 DIAGNOSIS — G89.4 CHRONIC PAIN SYNDROME: Chronic | ICD-10-CM

## 2024-05-28 DIAGNOSIS — M32.9 SYSTEMIC LUPUS ERYTHEMATOSUS, UNSPECIFIED SLE TYPE, UNSPECIFIED ORGAN INVOLVEMENT STATUS: Chronic | ICD-10-CM

## 2024-05-28 PROCEDURE — 1126F AMNT PAIN NOTED NONE PRSNT: CPT | Performed by: FAMILY MEDICINE

## 2024-05-28 PROCEDURE — 3077F SYST BP >= 140 MM HG: CPT | Performed by: FAMILY MEDICINE

## 2024-05-28 PROCEDURE — 99214 OFFICE O/P EST MOD 30 MIN: CPT | Performed by: FAMILY MEDICINE

## 2024-05-28 PROCEDURE — 3080F DIAST BP >= 90 MM HG: CPT | Performed by: FAMILY MEDICINE

## 2024-05-28 RX ORDER — CLINDAMYCIN HYDROCHLORIDE 150 MG/1
150 CAPSULE ORAL 4 TIMES DAILY
COMMUNITY
Start: 2024-05-21

## 2024-05-28 RX ORDER — CLONIDINE HYDROCHLORIDE 0.2 MG/1
0.2 TABLET ORAL 3 TIMES DAILY PRN
Qty: 90 TABLET | Refills: 5 | Status: SHIPPED | OUTPATIENT
Start: 2024-05-28

## 2024-06-10 NOTE — PROGRESS NOTES
"Chief Complaint  Follow-up (Feeling better. ) and Sinusitis    Subjective          Danielle Manuel presents to [facility]  Sinusitis        Patient feeling better having recurrent sinusitis recently treated and feeling like this is improved    Continues to take Klonopin as needed for anxiety tizanidine for pain tramadol additionally as needed AYLA reviewed contract on file    She continues to treat lupus with medications methotrexate and folic acid stable overall      Objective   Vital Signs:   BP (!) 146/102 (BP Location: Left arm, Patient Position: Sitting, Cuff Size: Small Adult)   Pulse 66   Temp 98 °F (36.7 °C)   Resp 18   Ht 167.6 cm (66\")   Wt 56 kg (123 lb 6.4 oz)   SpO2 100%   BMI 19.92 kg/m²     BMI is within normal parameters. No other follow-up for BMI required.      Physical Exam  Vitals reviewed.   Constitutional:       Appearance: Normal appearance. She is well-developed.   HENT:      Head: Normocephalic and atraumatic.      Right Ear: External ear normal.      Left Ear: External ear normal.      Nose: Nose normal.   Eyes:      Conjunctiva/sclera: Conjunctivae normal.      Pupils: Pupils are equal, round, and reactive to light.   Cardiovascular:      Rate and Rhythm: Normal rate.   Pulmonary:      Effort: Pulmonary effort is normal.      Breath sounds: Normal breath sounds.   Abdominal:      General: There is no distension.   Feet:      Comments: Purple discoloration bilaterally, similar  to past appearances without pain or ulcers, calluses or other    Skin:     General: Skin is warm and dry.      Comments: Significant skin lesions chronic, improving   Neurological:      Mental Status: She is alert and oriented to person, place, and time. Mental status is at baseline.   Psychiatric:         Mood and Affect: Mood and affect normal.         Behavior: Behavior normal.         Thought Content: Thought content normal.         Judgment: Judgment normal.      "     Result Review :   The following data was reviewed by: Teofilo Ordonez DO on 05/28/2024:  Common labs          9/11/2023    12:30 4/9/2024    14:26   Common Labs   Glucose 84  83    BUN 6  7    Creatinine 0.92  0.89    Sodium 133  136    Potassium 4.8  4.2    Chloride 99  101    Calcium 9.4  9.3    Albumin 4.2  4.3    Total Bilirubin  0.6    Alkaline Phosphatase  104    AST (SGOT)  32    ALT (SGPT)  36    WBC  4.43    Hemoglobin  13.3    Hematocrit  40.6    Platelets  153    Total Cholesterol  217    Triglycerides  62    HDL Cholesterol  82    LDL Cholesterol   124                    Assessment and Plan    Diagnoses and all orders for this visit:    1. Lupus vasculitis (Primary)    2. Chronic pain syndrome    3. Cervicogenic headache    4. Anxiety disorder, unspecified type    5. Cervical radiculopathy    6. Systemic lupus erythematosus, unspecified SLE type, unspecified organ involvement status    Other orders  -     cloNIDine (CATAPRES) 0.2 MG tablet; Take 1 tablet by mouth 3 (Three) Times a Day As Needed for High Blood Pressure.  Dispense: 90 tablet; Refill: 5      Continue clonidine for blood pressure monitor at home    Continue to monitor symptoms take medicine as prescribed for lupus follow-up with specialist as indicated    AYLA reviewed contract on file taking Klonopin as needed for anxiety as well as other medications prescribed medications reviewed today    Continue to monitor headaches and continue to follow-up at least every couple months \if continues to worsen seems to be related to possible sinusitis will closely follow to see if remains controlled stable      Follow Up   Return in about 6 weeks (around 7/9/2024), or if symptoms worsen or fail to improve, for Next scheduled follow up, Recheck.  Patient was given instructions and counseling regarding her condition or for health maintenance advice. Please see specific information pulled into the AVS if appropriate.     Transcribed from ambient  dictation for Teofilo Ordonez DO by Teofilo Ordonez DO.  06/10/24   15:01 EDT

## 2024-07-01 ENCOUNTER — OFFICE VISIT (OUTPATIENT)
Dept: FAMILY MEDICINE CLINIC | Facility: CLINIC | Age: 64
End: 2024-07-01
Payer: MEDICARE

## 2024-07-01 VITALS
WEIGHT: 121.8 LBS | HEART RATE: 58 BPM | RESPIRATION RATE: 16 BRPM | BODY MASS INDEX: 19.58 KG/M2 | OXYGEN SATURATION: 100 % | TEMPERATURE: 97.3 F | HEIGHT: 66 IN | SYSTOLIC BLOOD PRESSURE: 146 MMHG | DIASTOLIC BLOOD PRESSURE: 103 MMHG

## 2024-07-01 DIAGNOSIS — I10 PRIMARY HYPERTENSION: Primary | Chronic | ICD-10-CM

## 2024-07-01 DIAGNOSIS — G89.4 CHRONIC PAIN SYNDROME: ICD-10-CM

## 2024-07-01 DIAGNOSIS — G44.86 CERVICOGENIC HEADACHE: Chronic | ICD-10-CM

## 2024-07-01 DIAGNOSIS — J32.9 RECURRENT SINUS INFECTIONS: ICD-10-CM

## 2024-07-01 DIAGNOSIS — F41.9 ANXIETY DISORDER, UNSPECIFIED TYPE: Chronic | ICD-10-CM

## 2024-07-01 PROCEDURE — 99214 OFFICE O/P EST MOD 30 MIN: CPT | Performed by: FAMILY MEDICINE

## 2024-07-01 PROCEDURE — 3080F DIAST BP >= 90 MM HG: CPT | Performed by: FAMILY MEDICINE

## 2024-07-01 PROCEDURE — 1159F MED LIST DOCD IN RCRD: CPT | Performed by: FAMILY MEDICINE

## 2024-07-01 PROCEDURE — 3077F SYST BP >= 140 MM HG: CPT | Performed by: FAMILY MEDICINE

## 2024-07-01 PROCEDURE — 1160F RVW MEDS BY RX/DR IN RCRD: CPT | Performed by: FAMILY MEDICINE

## 2024-07-01 PROCEDURE — 1126F AMNT PAIN NOTED NONE PRSNT: CPT | Performed by: FAMILY MEDICINE

## 2024-07-01 RX ORDER — AMLODIPINE BESYLATE 5 MG/1
5 TABLET ORAL DAILY
Qty: 90 TABLET | Refills: 1 | Status: SHIPPED | OUTPATIENT
Start: 2024-07-01

## 2024-07-01 RX ORDER — CEFDINIR 300 MG/1
300 CAPSULE ORAL 2 TIMES DAILY
Qty: 20 CAPSULE | Refills: 0 | Status: SHIPPED | OUTPATIENT
Start: 2024-07-01

## 2024-07-01 RX ORDER — AMLODIPINE BESYLATE 2.5 MG/1
TABLET ORAL
COMMUNITY
End: 2024-07-01

## 2024-07-10 PROBLEM — J32.9 RECURRENT SINUS INFECTIONS: Status: ACTIVE | Noted: 2024-07-10

## 2024-07-10 NOTE — PROGRESS NOTES
"Chief Complaint  Headache and Hypertension    Subjective          Danielle Manuel presents to [facility]  Headache  Hypertension  Associated symptoms include headaches.       Patient presents with continued issues with headache likely related to blood pressure elevations have been continuing to trend up despite medications    She has lupus taking medicine as directed to help manage.  Also has other chronic conditions on medicine for anxiety long-term AYLA reviewed contract on file    Objective   Vital Signs:   BP (!) 146/103 (BP Location: Left arm, Patient Position: Sitting, Cuff Size: Small Adult)   Pulse 58   Temp 97.3 °F (36.3 °C)   Resp 16   Ht 167.6 cm (66\")   Wt 55.2 kg (121 lb 12.8 oz)   SpO2 100%   BMI 19.66 kg/m²     BMI is within normal parameters. No other follow-up for BMI required.      Physical Exam  Vitals reviewed.   Constitutional:       Appearance: Normal appearance. She is well-developed.   HENT:      Head: Normocephalic and atraumatic.      Right Ear: External ear normal.      Left Ear: External ear normal.      Nose: Nose normal.   Eyes:      Conjunctiva/sclera: Conjunctivae normal.      Pupils: Pupils are equal, round, and reactive to light.   Cardiovascular:      Rate and Rhythm: Normal rate.   Pulmonary:      Effort: Pulmonary effort is normal.      Breath sounds: Normal breath sounds.   Abdominal:      General: There is no distension.   Skin:     General: Skin is warm and dry.   Neurological:      Mental Status: She is alert and oriented to person, place, and time. Mental status is at baseline.   Psychiatric:         Mood and Affect: Mood and affect normal.         Behavior: Behavior normal.         Thought Content: Thought content normal.         Judgment: Judgment normal.          Result Review :   The following data was reviewed by: Teofilo Ordonez DO on 07/01/2024:  Common labs          9/11/2023    12:30 4/9/2024    14:26   Common Labs "   Glucose 84  83    BUN 6  7    Creatinine 0.92  0.89    Sodium 133  136    Potassium 4.8  4.2    Chloride 99  101    Calcium 9.4  9.3    Albumin 4.2  4.3    Total Bilirubin  0.6    Alkaline Phosphatase  104    AST (SGOT)  32    ALT (SGPT)  36    WBC  4.43    Hemoglobin  13.3    Hematocrit  40.6    Platelets  153    Total Cholesterol  217    Triglycerides  62    HDL Cholesterol  82    LDL Cholesterol   124                    Assessment and Plan    Diagnoses and all orders for this visit:    1. Primary hypertension (Primary)    2. Anxiety disorder, unspecified type    3. Chronic pain syndrome    4. Cervicogenic headache    5. Recurrent sinus infections    Other orders  -     amLODIPine (NORVASC) 5 MG tablet; Take 1 tablet by mouth Daily.  Dispense: 90 tablet; Refill: 1  -     cefdinir (OMNICEF) 300 MG capsule; Take 1 capsule by mouth 2 (Two) Times a Day.  Dispense: 20 capsule; Refill: 0      Adjust medication for blood pressure control to improve to less than 140/90 continue other medicines as prescribed    Continues to have migraines or headaches could be related to blood pressure hopefully lowering will improve continue other medicines as prescribed for treatment of headache    Continue medicines for lupus as prescribed improved or stable    Antibiotic prescribed for sinus infection recurrent continue to take medicine otherwise as prescribed      Follow Up   Return in about 4 weeks (around 7/29/2024), or if symptoms worsen or fail to improve, for Next scheduled follow up, Recheck.  Patient was given instructions and counseling regarding her condition or for health maintenance advice. Please see specific information pulled into the AVS if appropriate.     Transcribed from ambient dictation for Teofilo Ordonez DO by Teofilo Ordonez DO.  07/10/24   10:32 EDT       Detail Level: Detailed

## 2024-07-22 ENCOUNTER — OFFICE VISIT (OUTPATIENT)
Dept: FAMILY MEDICINE CLINIC | Facility: CLINIC | Age: 64
End: 2024-07-22
Payer: MEDICARE

## 2024-07-22 VITALS
SYSTOLIC BLOOD PRESSURE: 141 MMHG | OXYGEN SATURATION: 100 % | WEIGHT: 121.2 LBS | RESPIRATION RATE: 16 BRPM | DIASTOLIC BLOOD PRESSURE: 96 MMHG | BODY MASS INDEX: 19.48 KG/M2 | TEMPERATURE: 97.6 F | HEART RATE: 55 BPM | HEIGHT: 66 IN

## 2024-07-22 DIAGNOSIS — I10 PRIMARY HYPERTENSION: Primary | Chronic | ICD-10-CM

## 2024-07-22 DIAGNOSIS — I77.89 LUPUS VASCULITIS: Chronic | ICD-10-CM

## 2024-07-22 DIAGNOSIS — F41.9 ANXIETY DISORDER, UNSPECIFIED TYPE: Chronic | ICD-10-CM

## 2024-07-22 DIAGNOSIS — M54.12 CERVICAL RADICULOPATHY: ICD-10-CM

## 2024-07-22 DIAGNOSIS — M32.9 SYSTEMIC LUPUS ERYTHEMATOSUS, UNSPECIFIED SLE TYPE, UNSPECIFIED ORGAN INVOLVEMENT STATUS: Chronic | ICD-10-CM

## 2024-07-22 DIAGNOSIS — G44.52 NEW DAILY PERSISTENT HEADACHE: Chronic | ICD-10-CM

## 2024-07-22 DIAGNOSIS — J32.9 RECURRENT SINUS INFECTIONS: Chronic | ICD-10-CM

## 2024-07-22 DIAGNOSIS — M32.19 LUPUS VASCULITIS: Chronic | ICD-10-CM

## 2024-07-22 PROCEDURE — 3080F DIAST BP >= 90 MM HG: CPT | Performed by: FAMILY MEDICINE

## 2024-07-22 PROCEDURE — 1126F AMNT PAIN NOTED NONE PRSNT: CPT | Performed by: FAMILY MEDICINE

## 2024-07-22 PROCEDURE — 3077F SYST BP >= 140 MM HG: CPT | Performed by: FAMILY MEDICINE

## 2024-07-22 PROCEDURE — 1160F RVW MEDS BY RX/DR IN RCRD: CPT | Performed by: FAMILY MEDICINE

## 2024-07-22 PROCEDURE — 1159F MED LIST DOCD IN RCRD: CPT | Performed by: FAMILY MEDICINE

## 2024-07-22 PROCEDURE — 99214 OFFICE O/P EST MOD 30 MIN: CPT | Performed by: FAMILY MEDICINE

## 2024-07-22 RX ORDER — ATOGEPANT 60 MG/1
60 TABLET ORAL DAILY
Qty: 15 TABLET | Refills: 0 | COMMUNITY
Start: 2024-07-22

## 2024-07-24 ENCOUNTER — PATIENT ROUNDING (BHMG ONLY) (OUTPATIENT)
Dept: FAMILY MEDICINE CLINIC | Facility: CLINIC | Age: 64
End: 2024-07-24
Payer: MEDICARE

## 2024-07-29 ENCOUNTER — OFFICE VISIT (OUTPATIENT)
Dept: FAMILY MEDICINE CLINIC | Facility: CLINIC | Age: 64
End: 2024-07-29
Payer: MEDICARE

## 2024-07-29 VITALS
TEMPERATURE: 98.7 F | DIASTOLIC BLOOD PRESSURE: 90 MMHG | WEIGHT: 119.4 LBS | OXYGEN SATURATION: 99 % | BODY MASS INDEX: 19.19 KG/M2 | HEIGHT: 66 IN | HEART RATE: 66 BPM | SYSTOLIC BLOOD PRESSURE: 120 MMHG

## 2024-07-29 DIAGNOSIS — R30.0 DYSURIA: Primary | ICD-10-CM

## 2024-07-29 DIAGNOSIS — R31.9 URINARY TRACT INFECTION WITH HEMATURIA, SITE UNSPECIFIED: ICD-10-CM

## 2024-07-29 DIAGNOSIS — N39.0 URINARY TRACT INFECTION WITH HEMATURIA, SITE UNSPECIFIED: ICD-10-CM

## 2024-07-29 LAB
BILIRUB BLD-MCNC: NEGATIVE MG/DL
CLARITY, POC: CLEAR
COLOR UR: YELLOW
EXPIRATION DATE: ABNORMAL
GLUCOSE UR STRIP-MCNC: NEGATIVE MG/DL
KETONES UR QL: NEGATIVE
LEUKOCYTE EST, POC: ABNORMAL
Lab: ABNORMAL
NITRITE UR-MCNC: POSITIVE MG/ML
PH UR: 5.5 [PH] (ref 5–8)
PROT UR STRIP-MCNC: NEGATIVE MG/DL
RBC # UR STRIP: ABNORMAL /UL
SP GR UR: 1 (ref 1–1.03)
UROBILINOGEN UR QL: ABNORMAL

## 2024-07-29 PROCEDURE — 87186 SC STD MICRODIL/AGAR DIL: CPT

## 2024-07-29 PROCEDURE — 3074F SYST BP LT 130 MM HG: CPT

## 2024-07-29 PROCEDURE — 87088 URINE BACTERIA CULTURE: CPT

## 2024-07-29 PROCEDURE — 3080F DIAST BP >= 90 MM HG: CPT

## 2024-07-29 PROCEDURE — 87086 URINE CULTURE/COLONY COUNT: CPT

## 2024-07-29 PROCEDURE — 99213 OFFICE O/P EST LOW 20 MIN: CPT

## 2024-07-29 PROCEDURE — 1125F AMNT PAIN NOTED PAIN PRSNT: CPT

## 2024-07-29 PROCEDURE — 81003 URINALYSIS AUTO W/O SCOPE: CPT

## 2024-07-29 RX ORDER — NITROFURANTOIN 25; 75 MG/1; MG/1
100 CAPSULE ORAL 2 TIMES DAILY
Qty: 14 CAPSULE | Refills: 0 | Status: SHIPPED | OUTPATIENT
Start: 2024-07-29 | End: 2024-08-05 | Stop reason: SDUPTHER

## 2024-07-29 RX ORDER — PROPRANOLOL HCL 60 MG
60 CAPSULE, EXTENDED RELEASE 24HR ORAL DAILY
COMMUNITY

## 2024-07-29 NOTE — PROGRESS NOTES
"Chief Complaint  Difficulty Urinating (Since Saturday night) and Urinary Frequency    Subjective        Danielle Manuel presents to [facility]  History of Present Illness  Danielle is here to be seen for difficulty urinating since Saturday night and urinary frequency. Her urine dip shows leukocytes and nitrites. Will start her on medication now and also send off for urine culture.       Objective   Vital Signs:  /90 (BP Location: Right arm, Patient Position: Sitting, Cuff Size: Small Adult)   Pulse 66   Temp 98.7 °F (37.1 °C)   Ht 167.6 cm (66\")   Wt 54.2 kg (119 lb 6.4 oz)   SpO2 99%   BMI 19.27 kg/m²   Estimated body mass index is 19.27 kg/m² as calculated from the following:    Height as of this encounter: 167.6 cm (66\").    Weight as of this encounter: 54.2 kg (119 lb 6.4 oz).       BMI is within normal parameters. No other follow-up for BMI required.      Physical Exam  Constitutional:       Appearance: Normal appearance.   HENT:      Nose: Nose normal.      Mouth/Throat:      Mouth: Mucous membranes are moist.   Cardiovascular:      Rate and Rhythm: Normal rate and regular rhythm.      Pulses: Normal pulses.      Heart sounds: Normal heart sounds.   Pulmonary:      Effort: Pulmonary effort is normal.      Breath sounds: Normal breath sounds.   Skin:     General: Skin is warm and dry.   Neurological:      General: No focal deficit present.      Mental Status: She is alert and oriented to person, place, and time.   Psychiatric:         Mood and Affect: Mood normal.         Behavior: Behavior normal.        Result Review :                     Assessment and Plan     Diagnoses and all orders for this visit:    1. Dysuria (Primary)  -     POCT urinalysis dipstick, automated    2. Urinary tract infection with hematuria, site unspecified  -     nitrofurantoin, macrocrystal-monohydrate, (Macrobid) 100 MG capsule; Take 1 capsule by mouth 2 (Two) Times a Day.  Dispense: 14 " capsule; Refill: 0             Follow Up     No follow-ups on file.  Patient was given instructions and counseling regarding her condition or for health maintenance advice. Please see specific information pulled into the AVS if appropriate.

## 2024-07-29 NOTE — PROGRESS NOTES
"Chief Complaint  Headache and Hypertension    Subjective          Danielle Manuel presents to [facility]  Headache  Hypertension  Associated symptoms include headaches.       Patient recently seen a few weeks ago with continued issues related to headache we renewed amlodipine advised to take regularly and cefdinir for sinus infection which could contribute.    Now continues to have headaches, bp the same or similar, having daily no better no worse, other chronic conditions could make more complex like lupus and on immunosuppressants    Objective   Vital Signs:   /96 (BP Location: Left arm, Patient Position: Sitting, Cuff Size: Small Adult)   Pulse 55   Temp 97.6 °F (36.4 °C)   Resp 16   Ht 167.6 cm (66\")   Wt 55 kg (121 lb 3.2 oz)   SpO2 100%   BMI 19.56 kg/m²     BMI is within normal parameters. No other follow-up for BMI required.      Physical Exam  Vitals reviewed.   Constitutional:       Appearance: Normal appearance. She is well-developed.   HENT:      Head: Normocephalic and atraumatic.      Right Ear: External ear normal.      Left Ear: External ear normal.      Nose: Nose normal.   Eyes:      Conjunctiva/sclera: Conjunctivae normal.      Pupils: Pupils are equal, round, and reactive to light.   Cardiovascular:      Rate and Rhythm: Normal rate.   Pulmonary:      Effort: Pulmonary effort is normal.      Breath sounds: Normal breath sounds.   Abdominal:      General: There is no distension.   Skin:     General: Skin is warm and dry.   Neurological:      Mental Status: She is alert and oriented to person, place, and time. Mental status is at baseline.   Psychiatric:         Mood and Affect: Mood and affect normal.         Behavior: Behavior normal.         Thought Content: Thought content normal.         Judgment: Judgment normal.          Result Review :   The following data was reviewed by: Teofilo Ordonez DO on 07/22/2024:  Common labs          9/11/2023    " 12:30 4/9/2024    14:26   Common Labs   Glucose 84  83    BUN 6  7    Creatinine 0.92  0.89    Sodium 133  136    Potassium 4.8  4.2    Chloride 99  101    Calcium 9.4  9.3    Albumin 4.2  4.3    Total Bilirubin  0.6    Alkaline Phosphatase  104    AST (SGOT)  32    ALT (SGPT)  36    WBC  4.43    Hemoglobin  13.3    Hematocrit  40.6    Platelets  153    Total Cholesterol  217    Triglycerides  62    HDL Cholesterol  82    LDL Cholesterol   124                    Assessment and Plan    Diagnoses and all orders for this visit:    1. Primary hypertension (Primary)    2. Lupus vasculitis    3. New daily persistent headache  -     CT Head Without Contrast; Future    4. Cervical radiculopathy    5. Systemic lupus erythematosus, unspecified SLE type, unspecified organ involvement status    6. Recurrent sinus infections    7. Anxiety disorder, unspecified type    Other orders  -     Atogepant (Qulipta) 60 MG tablet; Take 1 tablet by mouth Daily.  Dispense: 15 tablet; Refill: 0      Tension headache vs chronic migraine  Reviewed if medicines for blood pressure helping and to make sure and take changes made regularly to help  Start qulpta 60mg daily to reduce to relieve daily headaches, samples for 2 weeks provided and if helps will rx medicine to take daily  Also propranolol on previously advised to take daily and increased to also reduce recurrent headaches    Bp monitor at home goal <140/90    Labs reviewed, recommend CT head without contrast given ongoing headaches, thought to be sinus related at first and deferred after improved with treating sinus infections  now advised important we proceed to find out    Continue meds for lupus, methotrexate and folate  Rx medicine for anxiety [facility] contract file      Follow Up   Return in about 2 weeks (around 8/5/2024), or if symptoms worsen or fail to improve, for Next scheduled follow up, Recheck.  Patient was given instructions and counseling regarding her condition or for  health maintenance advice. Please see specific information pulled into the AVS if appropriate.     Transcribed from ambient dictation for Teofilo Ordonez DO by Teofilo Ordonez DO.  07/29/24   03:33 EDT

## 2024-07-31 LAB — BACTERIA SPEC AEROBE CULT: ABNORMAL

## 2024-08-05 ENCOUNTER — TELEPHONE (OUTPATIENT)
Dept: FAMILY MEDICINE CLINIC | Facility: CLINIC | Age: 64
End: 2024-08-05
Payer: MEDICARE

## 2024-08-05 DIAGNOSIS — N39.0 URINARY TRACT INFECTION WITH HEMATURIA, SITE UNSPECIFIED: ICD-10-CM

## 2024-08-05 DIAGNOSIS — R31.9 URINARY TRACT INFECTION WITH HEMATURIA, SITE UNSPECIFIED: ICD-10-CM

## 2024-08-05 RX ORDER — NITROFURANTOIN 25; 75 MG/1; MG/1
100 CAPSULE ORAL 2 TIMES DAILY
Qty: 14 CAPSULE | Refills: 0 | Status: SHIPPED | OUTPATIENT
Start: 2024-08-05

## 2024-08-05 NOTE — TELEPHONE ENCOUNTER
Batsheva sent in another round of macrobid. Once completed if still having symptoms she will need to be seen. Patient aware

## 2024-08-05 NOTE — TELEPHONE ENCOUNTER
Caller: Danielle Manuel    Relationship: Self    Best call back number: [phone]     What medication are you requesting: SOMETHING FOR UTI    What are your current symptoms: WAS SEEN ON 07/29 BY MARIA ANTONIA WAS GIVEN MACROBID, AND SYMPTOMS ARE NOW WORSE - INCREASED PAIN, FREQUENCY, BURNING    PLEASE ADVISE      If a prescription is needed, what is your preferred pharmacy and phone number: [facility] [id number] - [place], [place] - [address] AT [facility] [address] - [phone] [facility] [phone] FX

## 2024-08-09 ENCOUNTER — HOSPITAL ENCOUNTER (OUTPATIENT)
Dept: CT IMAGING | Facility: HOSPITAL | Age: 64
Discharge: HOME | End: 2024-08-09
Payer: MEDICARE

## 2024-08-09 DIAGNOSIS — G44.52 NEW DAILY PERSISTENT HEADACHE: Chronic | ICD-10-CM

## 2024-08-09 PROCEDURE — 70450 CT HEAD/BRAIN W/O DYE: CPT

## 2024-08-13 ENCOUNTER — OFFICE VISIT (OUTPATIENT)
Dept: FAMILY MEDICINE CLINIC | Facility: CLINIC | Age: 64
End: 2024-08-13
Payer: MEDICARE

## 2024-08-13 VITALS
TEMPERATURE: 97.1 F | RESPIRATION RATE: 16 BRPM | HEART RATE: 52 BPM | SYSTOLIC BLOOD PRESSURE: 147 MMHG | OXYGEN SATURATION: 100 % | WEIGHT: 120.4 LBS | HEIGHT: 66 IN | BODY MASS INDEX: 19.35 KG/M2 | DIASTOLIC BLOOD PRESSURE: 91 MMHG

## 2024-08-13 DIAGNOSIS — G44.52 NEW DAILY PERSISTENT HEADACHE: ICD-10-CM

## 2024-08-13 DIAGNOSIS — M32.19 LUPUS VASCULITIS: Chronic | ICD-10-CM

## 2024-08-13 DIAGNOSIS — I10 PRIMARY HYPERTENSION: Chronic | ICD-10-CM

## 2024-08-13 DIAGNOSIS — I77.89 LUPUS VASCULITIS: Chronic | ICD-10-CM

## 2024-08-13 DIAGNOSIS — G43.701 CHRONIC MIGRAINE W/O AURA, NOT INTRACTABLE, W STAT MIGR: Primary | Chronic | ICD-10-CM

## 2024-08-13 PROCEDURE — 3080F DIAST BP >= 90 MM HG: CPT | Performed by: FAMILY MEDICINE

## 2024-08-13 PROCEDURE — 1125F AMNT PAIN NOTED PAIN PRSNT: CPT | Performed by: FAMILY MEDICINE

## 2024-08-13 PROCEDURE — 99214 OFFICE O/P EST MOD 30 MIN: CPT | Performed by: FAMILY MEDICINE

## 2024-08-13 PROCEDURE — 1159F MED LIST DOCD IN RCRD: CPT | Performed by: FAMILY MEDICINE

## 2024-08-13 PROCEDURE — 3077F SYST BP >= 140 MM HG: CPT | Performed by: FAMILY MEDICINE

## 2024-08-13 PROCEDURE — 1160F RVW MEDS BY RX/DR IN RCRD: CPT | Performed by: FAMILY MEDICINE

## 2024-08-13 RX ORDER — ATOGEPANT 60 MG/1
60 TABLET ORAL DAILY
Qty: 30 TABLET | Refills: 5 | Status: SHIPPED | OUTPATIENT
Start: 2024-08-13

## 2024-08-13 NOTE — PROGRESS NOTES
"Chief Complaint  Headache and Hypertension    Subjective          Danielle Manuel presents to [facility]  Headache  Hypertension  Associated symptoms include headaches.       Patient presents to follow-up on persistent headaches we change blood pressure medicine to help improve and patient had CT scan of head that shows no abnormality other than mild ischemic chronic vessel disease and some chronic sinus disease.  She has several chronic and contributing conditions including lupus vasculitis pulmonary hypertension idiopathic neuropathy arthritis insomnia    New medicine Qulipta has helped significantly still has some elevated blood pressures but she has been out of medication samples we provided in the last 3 days which seems to exacerbate symptoms as well as blood pressure issues  .  Objective   Vital Signs:   /91 (BP Location: Right arm, Patient Position: Sitting, Cuff Size: Adult)   Pulse 52   Temp 97.1 °F (36.2 °C)   Resp 16   Ht 167.6 cm (66\")   Wt 54.6 kg (120 lb 6.4 oz)   SpO2 100%   BMI 19.43 kg/m²     BMI is within normal parameters. No other follow-up for BMI required.      Physical Exam  Vitals reviewed.   Constitutional:       Appearance: Normal appearance. She is well-developed.   HENT:      Head: Normocephalic and atraumatic.      Right Ear: External ear normal.      Left Ear: External ear normal.      Nose: Nose normal.   Eyes:      Conjunctiva/sclera: Conjunctivae normal.      Pupils: Pupils are equal, round, and reactive to light.   Cardiovascular:      Rate and Rhythm: Normal rate.   Pulmonary:      Effort: Pulmonary effort is normal.      Breath sounds: Normal breath sounds.   Abdominal:      General: There is no distension.   Skin:     General: Skin is warm and dry.   Neurological:      Mental Status: She is alert and oriented to person, place, and time. Mental status is at baseline.   Psychiatric:         Mood and Affect: Mood and affect normal.   "       Behavior: Behavior normal.         Thought Content: Thought content normal.         Judgment: Judgment normal.          Result Review :   The following data was reviewed by: Teofilo Ordonez DO on 08/13/2024:  Common labs          9/11/2023    12:30 4/9/2024    14:26   Common Labs   Glucose 84  83    BUN 6  7    Creatinine 0.92  0.89    Sodium 133  136    Potassium 4.8  4.2    Chloride 99  101    Calcium 9.4  9.3    Albumin 4.2  4.3    Total Bilirubin  0.6    Alkaline Phosphatase  104    AST (SGOT)  32    ALT (SGPT)  36    WBC  4.43    Hemoglobin  13.3    Hematocrit  40.6    Platelets  153    Total Cholesterol  217    Triglycerides  62    HDL Cholesterol  82    LDL Cholesterol   124                    Assessment and Plan    Diagnoses and all orders for this visit:    1. Chronic migraine w/o aura, not intractable, w stat migr (Primary)    2. Lupus vasculitis    3. New daily persistent headache    4. Primary hypertension    Other orders  -     Atogepant (Qulipta) 60 MG tablet; Take 1 tablet by mouth Daily.  Dispense: 30 tablet; Refill: 5      Renew Qulipta for patient to continue daily use and also monitoring blood pressure at home goal less than 140/90 at least continue medicines as prescribed    Will follow-up in 3 months continue other medicines as prescribed for lupus and other chronic conditions and neuropathy additionally and chronic pain    AYLA on file contract up-to-date for Klonopin use anxiety treatment stable      Follow Up   Return in about 3 months (around 11/13/2024), or if symptoms worsen or fail to improve, for Next scheduled follow up, Recheck.  Patient was given instructions and counseling regarding her condition or for health maintenance advice. Please see specific information pulled into the AVS if appropriate.     Transcribed from ambient dictation for Teofilo Ordonez DO by Teofilo Ordonez DO.  08/13/24   09:52 EDT

## 2024-08-15 ENCOUNTER — PATIENT ROUNDING (BHMG ONLY) (OUTPATIENT)
Dept: FAMILY MEDICINE CLINIC | Facility: CLINIC | Age: 64
End: 2024-08-15
Payer: MEDICARE

## 2024-08-19 ENCOUNTER — TELEPHONE (OUTPATIENT)
Dept: FAMILY MEDICINE CLINIC | Facility: CLINIC | Age: 64
End: 2024-08-19
Payer: MEDICARE

## 2024-08-19 NOTE — TELEPHONE ENCOUNTER
Caller: Danielle Manuel    Relationship to patient: Self    Best call back number: [phone]     Patient is needing: PATIENT STATES HER INSURANCE WILL NOT COVER THE Atogepant (Qulipta) 60 MG tablet STATING THEY DO NOT COVER ANY ALTERNATIVE FOR THIS MEDICATION.    PATIENT IS UNSURE OF HOW TO PROCEED DUE TO SHE NEEDS SOMETHING FOR HER MIGRAINES. PLEASE CALL TO ADVISE PLAN OF ACTION.

## 2024-08-20 ENCOUNTER — TELEPHONE (OUTPATIENT)
Dept: FAMILY MEDICINE CLINIC | Facility: CLINIC | Age: 64
End: 2024-08-20
Payer: MEDICARE

## 2024-08-20 RX ORDER — ATOGEPANT 60 MG/1
60 TABLET ORAL DAILY
Qty: 30 TABLET | Refills: 5 | Status: SHIPPED | OUTPATIENT
Start: 2024-08-20

## 2024-08-20 NOTE — TELEPHONE ENCOUNTER
Sent rx to pharmacy again,  need to initiate PA.  Will need pharmacy to send insurance information to start PA process.

## 2024-08-20 NOTE — TELEPHONE ENCOUNTER
Patient states she has tried nurtec and Ubrelvy that did not help. Patient is requesting more samples of qulipta until the appeal is back?

## 2024-09-04 RX ORDER — ATOGEPANT 60 MG/1
60 TABLET ORAL DAILY
Qty: 30 TABLET | Refills: 5 | Status: SHIPPED | OUTPATIENT
Start: 2024-09-04

## 2024-09-04 NOTE — TELEPHONE ENCOUNTER
Caller: Kaleb Danielle L    Relationship: Self    Best call back number: [phone]     What medication are you requesting: ATOGEPANT (QULIPTA) 60 MG TABLET    What are your current symptoms: MIGRAINES      Have you had these symptoms before:    [x] Yes  [] No    Have you been treated for these symptoms before:   [x] Yes  [] No    If a prescription is needed, what is your preferred pharmacy and phone number:  [facility]    Additional notes: PATIENT SPOKE WITH PHARMACY AND INSURANCE ABOUT MEDICATION. ADVISED THAT PATIENT NEEDS TO GET MEDICATION FROM A SPECIALTY PHARMACY. PATIENT GETS OTEZLA MEDICATION FROM A SPECIALTY PHARMACY BUT THE BOTTLE OF MEDICATION IS NOT SPECIFIC AS TO WHICH SPECIALTY PHARMACY IT COMES FROM. MEDICATION IS DELIVERED BY [facility] FROM [facility] [place], [place]. PATIENT UNSURE IF MEDICATION COULD COME FROM THEM AS WELL. PHONE NUMBER FOR [facility] [phone].

## 2024-10-09 ENCOUNTER — TELEPHONE (OUTPATIENT)
Dept: FAMILY MEDICINE CLINIC | Facility: CLINIC | Age: 64
End: 2024-10-09
Payer: MEDICARE

## 2024-10-09 DIAGNOSIS — M32.19 LUPUS VASCULITIS: ICD-10-CM

## 2024-10-09 DIAGNOSIS — M32.9 SYSTEMIC LUPUS ERYTHEMATOSUS, UNSPECIFIED SLE TYPE, UNSPECIFIED ORGAN INVOLVEMENT STATUS: ICD-10-CM

## 2024-10-09 DIAGNOSIS — I77.89 LUPUS VASCULITIS: ICD-10-CM

## 2024-10-09 NOTE — TELEPHONE ENCOUNTER
[facility] is requesting refill on Methotrexate 2.5mg tablets.  Directions were take 2 po once weekly for 28 days then take 4 po once weekly for 28 days #24.  I tried to pend but it was having me choose new diagnosis and I am unfamiliar with what this is used for, or if patient still needs.  Please advise.

## 2024-10-13 RX ORDER — FOLIC ACID 1 MG/1
1000 TABLET ORAL DAILY
Qty: 90 TABLET | Refills: 3 | Status: SHIPPED | OUTPATIENT
Start: 2024-10-13

## 2024-10-13 RX ORDER — METHOTREXATE 2.5 MG/1
10 TABLET ORAL WEEKLY
Qty: 48 TABLET | Refills: 1 | Status: SHIPPED | OUTPATIENT
Start: 2024-10-13

## 2024-10-23 RX ORDER — CLONIDINE HYDROCHLORIDE 0.2 MG/1
0.2 TABLET ORAL 2 TIMES DAILY PRN
Qty: 60 TABLET | Refills: 1 | Status: SHIPPED | OUTPATIENT
Start: 2024-10-23

## 2024-11-06 RX ORDER — PROPRANOLOL HYDROCHLORIDE 60 MG/1
60 CAPSULE, EXTENDED RELEASE ORAL DAILY
Qty: 90 CAPSULE | Refills: 1 | Status: SHIPPED | OUTPATIENT
Start: 2024-11-06

## 2024-11-07 RX ORDER — AMLODIPINE BESYLATE 5 MG/1
TABLET ORAL
COMMUNITY
Start: 2024-09-25

## 2024-11-12 ENCOUNTER — OFFICE VISIT (OUTPATIENT)
Dept: FAMILY MEDICINE CLINIC | Facility: CLINIC | Age: 64
End: 2024-11-12
Payer: MEDICARE

## 2024-11-12 VITALS
HEIGHT: 66 IN | SYSTOLIC BLOOD PRESSURE: 133 MMHG | RESPIRATION RATE: 16 BRPM | OXYGEN SATURATION: 100 % | TEMPERATURE: 98 F | BODY MASS INDEX: 19.73 KG/M2 | HEART RATE: 56 BPM | WEIGHT: 122.8 LBS | DIASTOLIC BLOOD PRESSURE: 97 MMHG

## 2024-11-12 DIAGNOSIS — F41.1 GAD (GENERALIZED ANXIETY DISORDER): ICD-10-CM

## 2024-11-12 DIAGNOSIS — K22.89 ESOPHAGEAL THICKENING: ICD-10-CM

## 2024-11-12 DIAGNOSIS — IMO0002 LUPUS: ICD-10-CM

## 2024-11-12 DIAGNOSIS — G89.4 CHRONIC PAIN DISORDER: ICD-10-CM

## 2024-11-12 DIAGNOSIS — F41.9 ANXIETY DISORDER, UNSPECIFIED TYPE: Chronic | ICD-10-CM

## 2024-11-12 DIAGNOSIS — M80.00XD AGE-RELATED OSTEOPOROSIS WITH CURRENT PATHOLOGICAL FRACTURE WITH ROUTINE HEALING: ICD-10-CM

## 2024-11-12 DIAGNOSIS — M32.9 SYSTEMIC LUPUS ERYTHEMATOSUS, UNSPECIFIED SLE TYPE, UNSPECIFIED ORGAN INVOLVEMENT STATUS: Chronic | ICD-10-CM

## 2024-11-12 DIAGNOSIS — G89.4 CHRONIC PAIN SYNDROME: Chronic | ICD-10-CM

## 2024-11-12 DIAGNOSIS — M32.19 LUPUS VASCULITIS: Primary | Chronic | ICD-10-CM

## 2024-11-12 DIAGNOSIS — G43.701 CHRONIC MIGRAINE W/O AURA, NOT INTRACTABLE, W STAT MIGR: Chronic | ICD-10-CM

## 2024-11-12 DIAGNOSIS — F51.01 PRIMARY INSOMNIA: ICD-10-CM

## 2024-11-12 DIAGNOSIS — I10 PRIMARY HYPERTENSION: Chronic | ICD-10-CM

## 2024-11-12 DIAGNOSIS — M54.12 CERVICAL RADICULOPATHY: Chronic | ICD-10-CM

## 2024-11-12 DIAGNOSIS — L84 HEEL CALLUS: ICD-10-CM

## 2024-11-12 DIAGNOSIS — I77.89 LUPUS VASCULITIS: Primary | Chronic | ICD-10-CM

## 2024-11-12 PROBLEM — M54.14 THORACIC RADICULOPATHY: Status: ACTIVE | Noted: 2024-06-11

## 2024-11-12 PROBLEM — G89.29 CHRONIC PAIN: Status: ACTIVE | Noted: 2021-09-16

## 2024-11-12 PROCEDURE — 1160F RVW MEDS BY RX/DR IN RCRD: CPT | Performed by: FAMILY MEDICINE

## 2024-11-12 PROCEDURE — 1125F AMNT PAIN NOTED PAIN PRSNT: CPT | Performed by: FAMILY MEDICINE

## 2024-11-12 PROCEDURE — 1159F MED LIST DOCD IN RCRD: CPT | Performed by: FAMILY MEDICINE

## 2024-11-12 PROCEDURE — 3080F DIAST BP >= 90 MM HG: CPT | Performed by: FAMILY MEDICINE

## 2024-11-12 PROCEDURE — 3075F SYST BP GE 130 - 139MM HG: CPT | Performed by: FAMILY MEDICINE

## 2024-11-12 PROCEDURE — 99214 OFFICE O/P EST MOD 30 MIN: CPT | Performed by: FAMILY MEDICINE

## 2024-11-12 RX ORDER — HYDROCHLOROTHIAZIDE 12.5 MG/1
12.5 TABLET ORAL DAILY
Qty: 30 TABLET | Refills: 2 | Status: SHIPPED | OUTPATIENT
Start: 2024-11-12

## 2024-11-12 NOTE — PROGRESS NOTES
"Chief Complaint  Hypertension (Follow up on medications and ), Edema (Bilateral feet swelling. ), and Headache (Pt reports she is doing better with Qulipta. )    Subjective          Danielle Manuel presents to [facility]  Hypertension  Associated symptoms include headaches.   Headache        Patient presents for 3-month follow-up last seen 8/2024, had been having persistent headaches change blood pressure medicines around CT scan head was normal blood pressure continues to be an issue on amlodipine clonidine Qulipta did seem to help significantly with migraines and headaches.  She has several chronic conditions including lupus pulmonary hypertension idiopathic neuropathy arthritis insomnia anxiety Jony reviewed contract on file for continuation of medicine Klonopin she takes at night for anxiety.    Objective   Vital Signs:   /97 (BP Location: Right arm, Patient Position: Sitting, Cuff Size: Small Adult)   Pulse 56   Temp 98 °F (36.7 °C)   Resp 16   Ht 167.6 cm (66\")   Wt 55.7 kg (122 lb 12.8 oz)   SpO2 100%   BMI 19.82 kg/m²     BMI is within normal parameters. No other follow-up for BMI required.      Physical Exam  Vitals reviewed.   Constitutional:       Appearance: Normal appearance. She is well-developed.   HENT:      Head: Normocephalic and atraumatic.      Right Ear: External ear normal.      Left Ear: External ear normal.      Nose: Nose normal.   Eyes:      Conjunctiva/sclera: Conjunctivae normal.      Pupils: Pupils are equal, round, and reactive to light.   Cardiovascular:      Rate and Rhythm: Normal rate.   Pulmonary:      Effort: Pulmonary effort is normal.      Breath sounds: Normal breath sounds.   Abdominal:      General: There is no distension.   Musculoskeletal:        Feet:    Feet:      Comments: Plantar wart near 1st metatarsal, callus near heel very small  Skin:     General: Skin is warm and dry.   Neurological:      Mental Status: She is " alert and oriented to person, place, and time. Mental status is at baseline.   Psychiatric:         Mood and Affect: Mood and affect normal.         Behavior: Behavior normal.         Thought Content: Thought content normal.         Judgment: Judgment normal.          Result Review :   The following data was reviewed by: Teofilo Ordonez DO on 11/12/2024:  Common labs          4/9/2024    14:26   Common Labs   Glucose 83    BUN 7    Creatinine 0.89    Sodium 136    Potassium 4.2    Chloride 101    Calcium 9.3    Albumin 4.3    Total Bilirubin 0.6    Alkaline Phosphatase 104    AST (SGOT) 32    ALT (SGPT) 36    WBC 4.43    Hemoglobin 13.3    Hematocrit 40.6    Platelets 153    Total Cholesterol 217    Triglycerides 62    HDL Cholesterol 82    LDL Cholesterol  124                    Assessment and Plan    Diagnoses and all orders for this visit:    1. Lupus vasculitis (Primary)  -     Ambulatory Referral to Podiatry    2. Systemic lupus erythematosus, unspecified SLE type, unspecified organ involvement status  -     Ambulatory Referral to Podiatry    3. Chronic migraine w/o aura, not intractable, w stat migr    4. Primary hypertension  -     Enroll patient in hypertension care plan; Future  -     Enroll patient in hypertension care plan    5. Anxiety disorder, unspecified type    6. Chronic pain syndrome    7. Cervical radiculopathy    8. Heel callus  -     Ambulatory Referral to Podiatry    Other orders  -     hydroCHLOROthiazide 12.5 MG tablet; Take 1 tablet by mouth Daily.  Dispense: 30 tablet; Refill: 2      Continue to monitor blood pressure at home goal less than 140/90 on amlodipine clonidine  Add hctz low dose and monitor take in the morning with amlodipine follow-up 2 weeks    Consider increasing anxiety medicine if continues to have increased anxious symptoms and worry over blood pressure control    Qulipta continued for migraine management and follow-up if any change or worsening signs symptoms    Continue  with specialist for lupus recommend rechecking labs today or before follow-up    Referral to podiatry regarding foot complaints, plantar wart and callus on heel versus other, has longstanding chronic issues with her feet 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  Patient was given instructions and counseling regarding her condition or for health maintenance advice. Please see specific information pulled into the AVS if appropriate.     Transcribed from ambient dictation for Teofilo Ordonez DO by Teofilo Ordonez DO.  11/12/24   08:56 EST

## 2024-11-24 DIAGNOSIS — I77.89 LUPUS VASCULITIS: ICD-10-CM

## 2024-11-24 DIAGNOSIS — M32.9 SYSTEMIC LUPUS ERYTHEMATOSUS, UNSPECIFIED SLE TYPE, UNSPECIFIED ORGAN INVOLVEMENT STATUS: ICD-10-CM

## 2024-11-24 DIAGNOSIS — M32.19 LUPUS VASCULITIS: ICD-10-CM

## 2024-11-25 RX ORDER — METHOTREXATE 2.5 MG/1
TABLET ORAL
Qty: 24 TABLET | Refills: 5 | Status: SHIPPED | OUTPATIENT
Start: 2024-11-25

## 2024-11-26 ENCOUNTER — OFFICE VISIT (OUTPATIENT)
Dept: FAMILY MEDICINE CLINIC | Facility: CLINIC | Age: 64
End: 2024-11-26
Payer: MEDICARE

## 2024-11-26 VITALS
OXYGEN SATURATION: 99 % | SYSTOLIC BLOOD PRESSURE: 137 MMHG | BODY MASS INDEX: 19.8 KG/M2 | HEIGHT: 66 IN | HEART RATE: 62 BPM | DIASTOLIC BLOOD PRESSURE: 87 MMHG | WEIGHT: 123.2 LBS | TEMPERATURE: 97.3 F | RESPIRATION RATE: 16 BRPM

## 2024-11-26 DIAGNOSIS — I77.89 LUPUS VASCULITIS: ICD-10-CM

## 2024-11-26 DIAGNOSIS — M32.19 LUPUS VASCULITIS: ICD-10-CM

## 2024-11-26 DIAGNOSIS — I10 PRIMARY HYPERTENSION: Primary | Chronic | ICD-10-CM

## 2024-11-26 PROCEDURE — 3079F DIAST BP 80-89 MM HG: CPT | Performed by: FAMILY MEDICINE

## 2024-11-26 PROCEDURE — 3075F SYST BP GE 130 - 139MM HG: CPT | Performed by: FAMILY MEDICINE

## 2024-11-26 PROCEDURE — 99213 OFFICE O/P EST LOW 20 MIN: CPT | Performed by: FAMILY MEDICINE

## 2024-11-26 PROCEDURE — 1125F AMNT PAIN NOTED PAIN PRSNT: CPT | Performed by: FAMILY MEDICINE

## 2024-11-26 PROCEDURE — 1159F MED LIST DOCD IN RCRD: CPT | Performed by: FAMILY MEDICINE

## 2024-11-26 PROCEDURE — 1160F RVW MEDS BY RX/DR IN RCRD: CPT | Performed by: FAMILY MEDICINE

## 2024-11-26 RX ORDER — CLONIDINE HYDROCHLORIDE 0.2 MG/1
0.2 TABLET ORAL 2 TIMES DAILY PRN
Qty: 60 TABLET | Refills: 1 | Status: SHIPPED | OUTPATIENT
Start: 2024-11-26

## 2024-12-03 NOTE — PROGRESS NOTES
"Chief Complaint  Hypertension (Follow up on BP and medications. )    Subjective          Danielle Manuel presents to [facility]  Hypertension        Patient presents to follow-up on blood pressure doing much better tolerating medicine has readings from home much improved feeling better    Objective   Vital Signs:   /87 (BP Location: Left arm, Patient Position: Sitting, Cuff Size: Adult)   Pulse 62   Temp 97.3 °F (36.3 °C)   Resp 16   Ht 167.6 cm (66\")   Wt 55.9 kg (123 lb 3.2 oz)   SpO2 99%   BMI 19.89 kg/m²     BMI is within normal parameters. No other follow-up for BMI required.      Physical Exam  Vitals reviewed.   Constitutional:       Appearance: Normal appearance. She is well-developed.   HENT:      Head: Normocephalic and atraumatic.      Right Ear: External ear normal.      Left Ear: External ear normal.      Nose: Nose normal.   Eyes:      Conjunctiva/sclera: Conjunctivae normal.      Pupils: Pupils are equal, round, and reactive to light.   Cardiovascular:      Rate and Rhythm: Normal rate.   Pulmonary:      Effort: Pulmonary effort is normal.      Breath sounds: Normal breath sounds.   Abdominal:      General: There is no distension.   Skin:     General: Skin is warm and dry.   Neurological:      Mental Status: She is alert and oriented to person, place, and time. Mental status is at baseline.   Psychiatric:         Mood and Affect: Mood and affect normal.         Behavior: Behavior normal.         Thought Content: Thought content normal.         Judgment: Judgment normal.          Result Review :   The following data was reviewed by: Teofilo Ordonez DO on 11/26/2024:  Common labs          4/9/2024    14:26   Common Labs   Glucose 83    BUN 7    Creatinine 0.89    Sodium 136    Potassium 4.2    Chloride 101    Calcium 9.3    Albumin 4.3    Total Bilirubin 0.6    Alkaline Phosphatase 104    AST (SGOT) 32    ALT (SGPT) 36    WBC 4.43    Hemoglobin 13.3  "   Hematocrit 40.6    Platelets 153    Total Cholesterol 217    Triglycerides 62    HDL Cholesterol 82    LDL Cholesterol  124                    Assessment and Plan    Diagnoses and all orders for this visit:    1. Primary hypertension (Primary)    2. Lupus vasculitis    Other orders  -     cloNIDine (CATAPRES) 0.2 MG tablet; Take 1 tablet by mouth 2 (Two) Times a Day As Needed for High Blood Pressure.  Dispense: 60 tablet; Refill: 1      Refill medications above continue with others as prescribed goal blood pressure less than 140/90 much improved symptoms improving additionally from this and headaches    Check labs at least every 3 to 6 months      Follow Up   Return in about 3 months (around 2/26/2025), or if symptoms worsen or fail to improve, for Next scheduled follow up, Recheck.  Patient was given instructions and counseling regarding her condition or for health maintenance advice. Please see specific information pulled into the AVS if appropriate.     Transcribed from ambient dictation for Teofilo Ordonez DO by Teofilo Ordonez DO.  12/03/24   12:40 EST

## 2024-12-26 RX ORDER — AMLODIPINE BESYLATE 5 MG/1
5 TABLET ORAL DAILY
Qty: 90 TABLET | Refills: 3 | Status: SHIPPED | OUTPATIENT
Start: 2024-12-26

## 2025-01-20 ENCOUNTER — OFFICE VISIT (OUTPATIENT)
Dept: PODIATRY | Facility: CLINIC | Age: 65
End: 2025-01-20
Payer: MEDICARE

## 2025-01-20 VITALS
WEIGHT: 123 LBS | HEIGHT: 66 IN | DIASTOLIC BLOOD PRESSURE: 90 MMHG | HEART RATE: 65 BPM | SYSTOLIC BLOOD PRESSURE: 141 MMHG | OXYGEN SATURATION: 97 % | BODY MASS INDEX: 19.77 KG/M2

## 2025-01-20 DIAGNOSIS — M79.672 FOOT PAIN, BILATERAL: Primary | ICD-10-CM

## 2025-01-20 DIAGNOSIS — M79.671 FOOT PAIN, BILATERAL: Primary | ICD-10-CM

## 2025-01-20 DIAGNOSIS — M32.19 LUPUS VASCULITIS: ICD-10-CM

## 2025-01-20 DIAGNOSIS — I77.89 LUPUS VASCULITIS: ICD-10-CM

## 2025-01-20 DIAGNOSIS — I73.00 RAYNAUD'S PHENOMENON WITHOUT GANGRENE: ICD-10-CM

## 2025-01-20 DIAGNOSIS — M32.9 SYSTEMIC LUPUS ERYTHEMATOSUS, UNSPECIFIED SLE TYPE, UNSPECIFIED ORGAN INVOLVEMENT STATUS: ICD-10-CM

## 2025-01-20 NOTE — PROGRESS NOTES
[facility] - PODIATRY    Today's Date: 01/20/25    Patient Name: Danielle Manuel  MRN: [id number]  CSN: [id number]  PCP: Teofilo Ordonez DO, Last PCP Visit: 26 November 2024  Referring Provider: Teofilo Ordonez DO    SUBJECTIVE     Chief Complaint   Patient presents with    Right Foot - Pain     Callus      HPI: Danielle Manuel, a 64 y.o.female, presents to clinic.    New, Established, New Problem: New    Location: Toes and forefoot.    Duration: Greater than 5 years    Onset: Insidious, associated with lupus    Nature: Discolored painful skin on exposed to any cool temperatures    Stable, worsening, improving: Worsening    Aggravating factors:  Patient relates pain is aggravated by shoe gear and ambulation.      Previous Treatment: Multiple different topical medicines.     Patient denies any fevers, chills, nausea, vomiting, shortness of breath, nor any other constitutional signs nor symptoms.    Patient states she continues to smoke.      Past Medical History:   Diagnosis Date    Acid reflux     Anxiety disorder 05/05/2021    Arthritis 09/01/2021    Bunion 1990    Callus 11/24    Cancer 01/01/2018    skin    Chronic pain 05/05/2021    Claustrophobia 01/01/1990    COPD (chronic obstructive pulmonary disease) 10/01/2021    CTS (carpal tunnel syndrome) 01/01/1990    Depression 01/2011    Difficulty walking 2022    Headache 09/072021    Hyperlipemia     Hyperlipidemia 05/05/2021    Hypertension 4/24    Ingrown toenail 1977    Insomnia, unspecified 05/05/2021    Low back pain 02/01/2021    Lupus     MRSA (methicillin resistant Staphylococcus aureus) 06/01/2021    Neuropathy of both feet 02/12/2023    Osteopenia 01/01/2010    Post menopausal syndrome 05/05/2021    Psoriasis     Thoracic disc disorder 02/01/2021    Urinary tract infection 7/24    Vitamin D deficiency 05/05/2021     Past Surgical History:   Procedure Laterality Date    APPENDECTOMY      CARPAL TUNNEL RELEASE  03/01/1990    COLONOSCOPY   cologuard    ENDOSCOPY N/A 08/08/2022    Procedure: ESOPHAGOGASTRODUODENOSCOPY;  Surgeon: Harmony Thacker MD;  Location: [facility] ENDOSCOPY;  Service: Gastroenterology;  Laterality: N/A;  NORMAL EGD    HYSTERECTOMY      TONSILECTOMY, ADENOIDECTOMY, BILATERAL MYRINGOTOMY AND TUBES      TONSILLECTOMY      TUBAL ABDOMINAL LIGATION       Family History   Problem Relation Age of Onset    Arthritis Mother     Anxiety disorder Mother     Hearing loss Mother     Hyperlipidemia Mother     Hypertension Mother     Heart disease Father     Alcohol abuse Father     Early death Father     Diabetes Brother     Hyperlipidemia Brother     Depression Son     Cancer Maternal Uncle     COPD Paternal Uncle     Heart disease Paternal Uncle     Hyperlipidemia Paternal Uncle     Cancer Maternal Grandmother     Cancer Maternal Grandfather     Early death Maternal Grandfather     Pancreatic cancer Other     Diabetes Other         MELLITUS     Social History     Socioeconomic History    Marital status:    Tobacco Use    Smoking status: Every Day     Current packs/day: 0.50     Average packs/day: 0.5 packs/day for 39.1 years (19.5 ttl pk-yrs)     Types: Cigarettes     Start date: 1/1/1986     Passive exposure: Past    Smokeless tobacco: Never   Vaping Use    Vaping status: Never Used   Substance and Sexual Activity    Alcohol use: Not Currently     Alcohol/week: 2.0 standard drinks of alcohol     Comment: CURRENT SOME DAY, DRINKS LESS THAN 1 DRINK PER DAY, HAS BEEN DRINKING FOR 21-30 YEARS     Drug use: Never    Sexual activity: Not Currently     Partners: Male     Birth control/protection: None     Allergies   Allergen Reactions    Penicillins Hives, Itching and Rash    Cyclobenzaprine Itching     Current Outpatient Medications   Medication Sig Dispense Refill    amLODIPine (NORVASC) 5 MG tablet TAKE 1 TABLET BY MOUTH DAILY 90 tablet 3    Atogepant (Qulipta) 60 MG tablet Take 1 tablet by mouth Daily. 30 tablet 5    calcium  carbonate (OS-PATTIE) 1250 (500 Ca) MG tablet Take 1 tablet by mouth Daily As Needed for Indigestion or Heartburn.      clonazePAM (KlonoPIN) 1 MG tablet Take 1 tablet by mouth At Night As Needed for Anxiety. 30 tablet 5    cloNIDine (CATAPRES) 0.2 MG tablet Take 1 tablet by mouth 2 (Two) Times a Day As Needed for High Blood Pressure. 60 tablet 1    desonide (DESOWEN) 0.05 % ointment Apply 1 Application topically to the appropriate area as directed 3 (Three) Times a Day As Needed.      folic acid (FOLVITE) 1 MG tablet Take 1 tablet by mouth Daily. 90 tablet 3    gabapentin (NEURONTIN) 300 MG capsule Take 1 capsule by mouth 3 (Three) Times a Day. 90 capsule 1    hydroCHLOROthiazide 12.5 MG tablet Take 1 tablet by mouth Daily. 30 tablet 2    meloxicam (MOBIC) 7.5 MG tablet Take 1 tablet by mouth Daily.      methotrexate 2.5 MG tablet TAKE TWO TABLETS BY MOUTH ONCE WEEKLY FOR 28 DAYS THEN TAKE FOUR TABLETS BY MOUTH ONCE WEEKLY FOR 28 DAYS **FOLLOW UP IN 4 TO 6 WEEKS AFTER STARTING 24 tablet 5    Omega-3 Fatty Acids (fish oil) 1000 MG capsule capsule Take 1 capsule by mouth Daily With Breakfast.      ondansetron ODT (ZOFRAN-ODT) 4 MG disintegrating tablet Place 1 tablet on the tongue Every 12 (Twelve) Hours As Needed for Nausea or Vomiting. 15 tablet 0    Otezla 30 MG tablet       tiZANidine (ZANAFLEX) 4 MG tablet Take 1 tablet by mouth 2 (Two) Times a Day As Needed for Muscle Spasms. 20 tablet 0    traMADol (ULTRAM) 50 MG tablet Take 1 tablet by mouth Daily As Needed for Moderate Pain . 30 tablet 2     No current facility-administered medications for this visit.     Review of Systems   Constitutional: Negative.    Skin:         Raynaud's phenomenon symptoms to toes bilaterally   All other systems reviewed and are negative.      OBJECTIVE     Vitals:    01/20/25 0931   BP: 141/90   Pulse: 65   SpO2: 97%       WBC   Date Value Ref Range Status   04/09/2024 4.43 3.40 - 10.80 10*3/mm3 Final     RBC   Date Value Ref Range  Status   04/09/2024 4.28 3.77 - 5.28 10*6/mm3 Final     Hemoglobin   Date Value Ref Range Status   04/09/2024 13.3 12.0 - 15.9 g/dL Final     Hematocrit   Date Value Ref Range Status   04/09/2024 40.6 34.0 - 46.6 % Final     MCV   Date Value Ref Range Status   04/09/2024 94.9 79.0 - 97.0 fL Final     MCH   Date Value Ref Range Status   04/09/2024 31.1 26.6 - 33.0 pg Final     MCHC   Date Value Ref Range Status   04/09/2024 32.8 31.5 - 35.7 g/dL Final     RDW   Date Value Ref Range Status   04/09/2024 14.4 12.3 - 15.4 % Final     RDW-SD   Date Value Ref Range Status   04/09/2024 49.2 37.0 - 54.0 fl Final     MPV   Date Value Ref Range Status   04/09/2024 13.0 (H) 6.0 - 12.0 fL Final     Platelets   Date Value Ref Range Status   04/09/2024 153 140 - 450 10*3/mm3 Final     Neutrophil %   Date Value Ref Range Status   06/07/2023 58.9 42.7 - 76.0 % Final     Lymphocyte %   Date Value Ref Range Status   06/07/2023 24.7 19.6 - 45.3 % Final     Monocyte %   Date Value Ref Range Status   06/07/2023 13.2 (H) 5.0 - 12.0 % Final     Eosinophil %   Date Value Ref Range Status   06/07/2023 1.3 0.3 - 6.2 % Final     Basophil %   Date Value Ref Range Status   06/07/2023 1.3 0.0 - 1.5 % Final     Immature Grans %   Date Value Ref Range Status   06/07/2023 0.6 (H) 0.0 - 0.5 % Final     Neutrophils, Absolute   Date Value Ref Range Status   06/07/2023 2.77 1.70 - 7.00 10*3/mm3 Final     Lymphocytes, Absolute   Date Value Ref Range Status   06/07/2023 1.16 0.70 - 3.10 10*3/mm3 Final     Monocytes, Absolute   Date Value Ref Range Status   06/07/2023 0.62 0.10 - 0.90 10*3/mm3 Final     Eosinophils, Absolute   Date Value Ref Range Status   06/07/2023 0.06 0.00 - 0.40 10*3/mm3 Final     Basophils, Absolute   Date Value Ref Range Status   06/07/2023 0.06 0.00 - 0.20 10*3/mm3 Final     Immature Grans, Absolute   Date Value Ref Range Status   06/07/2023 0.03 0.00 - 0.05 10*3/mm3 Final     nRBC   Date Value Ref Range Status   06/07/2023 0.0 0.0  - 0.2 /100 WBC Final         Lab Results   Component Value Date    GLUCOSE 83 04/09/2024    BUN 7 (L) 04/09/2024    CREATININE 0.89 04/09/2024    EGFRIFNONA 84 08/06/2021    BCR 7.9 04/09/2024    K 4.2 04/09/2024    CO2 27.0 04/09/2024    CALCIUM 9.3 04/09/2024    ALBUMIN 4.3 04/09/2024    LABIL2 1.1 (L) 05/14/2021    AST 32 04/09/2024    ALT 36 (H) 04/09/2024       Patient seen in no apparent distress.      PHYSICAL EXAM:     Foot/Ankle Exam    GENERAL  Appearance:  chronically ill  Orientation:  AAOx3  Affect:  appropriate  Gait:  unimpaired  Assistance:  independent  Right shoe gear: casual shoe  Left shoe gear: casual shoe    VASCULAR     Right Foot Vascularity   Dorsalis pedis:  1+  Posterior tibial:  1+  Skin temperature:  cool  Edema grading:  None  CFT:  4 (Raynaud's phenomenon at distal toes)  Pedal hair growth:  Absent  Varicosities:  severe varicosities     Left Foot Vascularity   Dorsalis pedis:  1+  Posterior tibial:  1+  Skin temperature:  cool  Edema grading:  None  CFT:  4 (Raynaud's phenomenon at distal toes)  Pedal hair growth:  Absent  Varicosities:  severe varicosities     NEUROLOGIC     Right Foot Neurologic   Normal sensation    Light touch sensation: normal  Vibratory sensation: normal  Hot/Cold sensation: normal     Left Foot Neurologic   Normal sensation    Light touch sensation: normal  Vibratory sensation: normal  Hot/Cold sensation:  normal    MUSCULOSKELETAL     Right Foot Musculoskeletal   Hallux valgus: Yes       Left Foot Musculoskeletal   Hallux valgus: Yes      MUSCLE STRENGTH     Right Foot Muscle Strength   Foot dorsiflexion:  4  Foot plantar flexion:  4  Foot inversion:  4  Foot eversion:  4     Left Foot Muscle Strength   Foot dorsiflexion:  4  Foot plantar flexion:  4  Foot inversion:  4  Foot eversion:  4    RANGE OF MOTION     Right Foot Range of Motion   Foot and ankle ROM within normal limits       Left Foot Range of Motion   Foot and ankle ROM within normal limits       DERMATOLOGIC      Right Foot Dermatologic   Skin  Right foot skin is intact.      Left Foot Dermatologic   Skin  Left foot skin is intact.       ASSESSMENT/PLAN     Diagnoses and all orders for this visit:    1. Foot pain, bilateral (Primary)    2. Lupus vasculitis    3. Systemic lupus erythematosus, unspecified SLE type, unspecified organ involvement status    4. Raynaud's phenomenon without gangrene    Rx:  Topical, compounded, nifedipine lotion medication was written; see attached prescription.    Comprehensive lower extremity examination and evaluation was performed.    Discussed findings and treatment plan including risks, benefits, and treatment options with patient in detail. Patient agreed with treatment plan.    Medications and allergies reviewed.  Reviewed available lab values along with other pertinent labs.  These were discussed with the patient.    An After Visit Summary was printed and given to the patient at discharge, including (if requested) any available informative/educational handouts regarding diagnosis, treatment, or medications. All questions were answered to patient/family satisfaction. Should symptoms fail to improve or worsen they agree to call or return to clinic or to go to the Emergency Department. Discussed the importance of following up with any needed screening tests/labs/specialist appointments and any requested follow-up recommended by me today. Importance of maintaining follow-up discussed and patient accepts that missed appointments can delay diagnosis and potentially lead to worsening of conditions.    Return if symptoms worsen or fail to improve., or sooner if acute issues arise.    This document has been electronically signed by Kevin Oneil DPM on January 20, 2025 10:25 EST

## 2025-02-06 RX ORDER — HYDROCHLOROTHIAZIDE 12.5 MG/1
12.5 TABLET ORAL DAILY
Qty: 30 TABLET | Refills: 2 | Status: SHIPPED | OUTPATIENT
Start: 2025-02-06

## 2025-02-07 RX ORDER — CLONIDINE HYDROCHLORIDE 0.2 MG/1
0.2 TABLET ORAL 2 TIMES DAILY PRN
Qty: 60 TABLET | Refills: 1 | Status: SHIPPED | OUTPATIENT
Start: 2025-02-07

## 2025-02-26 ENCOUNTER — OFFICE VISIT (OUTPATIENT)
Dept: FAMILY MEDICINE CLINIC | Facility: CLINIC | Age: 65
End: 2025-02-26
Payer: MEDICARE

## 2025-02-26 ENCOUNTER — LAB (OUTPATIENT)
Dept: LAB | Facility: HOSPITAL | Age: 65
End: 2025-02-26
Payer: MEDICARE

## 2025-02-26 VITALS
WEIGHT: 128 LBS | OXYGEN SATURATION: 100 % | HEIGHT: 66 IN | DIASTOLIC BLOOD PRESSURE: 98 MMHG | BODY MASS INDEX: 20.57 KG/M2 | TEMPERATURE: 97.8 F | SYSTOLIC BLOOD PRESSURE: 148 MMHG | RESPIRATION RATE: 16 BRPM | HEART RATE: 63 BPM

## 2025-02-26 DIAGNOSIS — G89.4 CHRONIC PAIN DISORDER: Chronic | ICD-10-CM

## 2025-02-26 DIAGNOSIS — F51.01 PRIMARY INSOMNIA: Chronic | ICD-10-CM

## 2025-02-26 DIAGNOSIS — I10 PRIMARY HYPERTENSION: Primary | Chronic | ICD-10-CM

## 2025-02-26 DIAGNOSIS — Z87.891 PERSONAL HISTORY OF NICOTINE DEPENDENCE: ICD-10-CM

## 2025-02-26 DIAGNOSIS — F41.1 GAD (GENERALIZED ANXIETY DISORDER): Chronic | ICD-10-CM

## 2025-02-26 DIAGNOSIS — K22.89 ESOPHAGEAL THICKENING: Chronic | ICD-10-CM

## 2025-02-26 DIAGNOSIS — I10 PRIMARY HYPERTENSION: Chronic | ICD-10-CM

## 2025-02-26 DIAGNOSIS — M32.9 SYSTEMIC LUPUS ERYTHEMATOSUS, UNSPECIFIED SLE TYPE, UNSPECIFIED ORGAN INVOLVEMENT STATUS: ICD-10-CM

## 2025-02-26 DIAGNOSIS — Z12.31 SCREENING MAMMOGRAM FOR BREAST CANCER: ICD-10-CM

## 2025-02-26 DIAGNOSIS — E55.9 VITAMIN D DEFICIENCY: ICD-10-CM

## 2025-02-26 LAB
25(OH)D3 SERPL-MCNC: 19.1 NG/ML (ref 30–100)
ALBUMIN SERPL-MCNC: 4 G/DL (ref 3.5–5.2)
ALBUMIN/GLOB SERPL: 1 G/DL
ALP SERPL-CCNC: 120 U/L (ref 39–117)
ALT SERPL W P-5'-P-CCNC: 28 U/L (ref 1–33)
ANION GAP SERPL CALCULATED.3IONS-SCNC: 14.1 MMOL/L (ref 5–15)
AST SERPL-CCNC: 42 U/L (ref 1–32)
BILIRUB SERPL-MCNC: 0.9 MG/DL (ref 0–1.2)
BUN SERPL-MCNC: 8 MG/DL (ref 8–23)
BUN/CREAT SERPL: 10.7 (ref 7–25)
C3 SERPL-MCNC: 113 MG/DL (ref 82–167)
C4 SERPL-MCNC: 15 MG/DL (ref 14–44)
CALCIUM SPEC-SCNC: 9.8 MG/DL (ref 8.6–10.5)
CHLORIDE SERPL-SCNC: 91 MMOL/L (ref 98–107)
CHOLEST SERPL-MCNC: 237 MG/DL (ref 0–200)
CO2 SERPL-SCNC: 28.9 MMOL/L (ref 22–29)
CREAT SERPL-MCNC: 0.75 MG/DL (ref 0.57–1)
CRP SERPL-MCNC: <0.3 MG/DL (ref 0–0.5)
DEPRECATED RDW RBC AUTO: 47.9 FL (ref 37–54)
EGFRCR SERPLBLD CKD-EPI 2021: 89.6 ML/MIN/1.73
ERYTHROCYTE [DISTWIDTH] IN BLOOD BY AUTOMATED COUNT: 13.9 % (ref 12.3–15.4)
ERYTHROCYTE [SEDIMENTATION RATE] IN BLOOD: 18 MM/HR (ref 0–30)
GLOBULIN UR ELPH-MCNC: 3.9 GM/DL
GLUCOSE SERPL-MCNC: 86 MG/DL (ref 65–99)
HCT VFR BLD AUTO: 42.4 % (ref 34–46.6)
HDLC SERPL-MCNC: 82 MG/DL (ref 40–60)
HGB BLD-MCNC: 14 G/DL (ref 12–15.9)
LDLC SERPL CALC-MCNC: 144 MG/DL (ref 0–100)
LDLC/HDLC SERPL: 1.73 {RATIO}
MCH RBC QN AUTO: 31.7 PG (ref 26.6–33)
MCHC RBC AUTO-ENTMCNC: 33 G/DL (ref 31.5–35.7)
MCV RBC AUTO: 96.1 FL (ref 79–97)
PLATELET # BLD AUTO: 222 10*3/MM3 (ref 140–450)
PMV BLD AUTO: 12.3 FL (ref 6–12)
POTASSIUM SERPL-SCNC: 3.4 MMOL/L (ref 3.5–5.2)
PROT SERPL-MCNC: 7.9 G/DL (ref 6–8.5)
PTH-INTACT SERPL-MCNC: 43.8 PG/ML (ref 15–65)
RBC # BLD AUTO: 4.41 10*6/MM3 (ref 3.77–5.28)
SODIUM SERPL-SCNC: 134 MMOL/L (ref 136–145)
T4 FREE SERPL-MCNC: 1.42 NG/DL (ref 0.92–1.68)
TRIGL SERPL-MCNC: 67 MG/DL (ref 0–150)
TSH SERPL DL<=0.05 MIU/L-ACNC: 1.11 UIU/ML (ref 0.27–4.2)
VLDLC SERPL-MCNC: 11 MG/DL (ref 5–40)
WBC NRBC COR # BLD AUTO: 4.48 10*3/MM3 (ref 3.4–10.8)

## 2025-02-26 PROCEDURE — 80053 COMPREHEN METABOLIC PANEL: CPT

## 2025-02-26 PROCEDURE — 85652 RBC SED RATE AUTOMATED: CPT

## 2025-02-26 PROCEDURE — 86160 COMPLEMENT ANTIGEN: CPT

## 2025-02-26 PROCEDURE — 36415 COLL VENOUS BLD VENIPUNCTURE: CPT

## 2025-02-26 PROCEDURE — 99214 OFFICE O/P EST MOD 30 MIN: CPT | Performed by: FAMILY MEDICINE

## 2025-02-26 PROCEDURE — 1159F MED LIST DOCD IN RCRD: CPT | Performed by: FAMILY MEDICINE

## 2025-02-26 PROCEDURE — 1160F RVW MEDS BY RX/DR IN RCRD: CPT | Performed by: FAMILY MEDICINE

## 2025-02-26 PROCEDURE — 84439 ASSAY OF FREE THYROXINE: CPT

## 2025-02-26 PROCEDURE — 3080F DIAST BP >= 90 MM HG: CPT | Performed by: FAMILY MEDICINE

## 2025-02-26 PROCEDURE — 83970 ASSAY OF PARATHORMONE: CPT

## 2025-02-26 PROCEDURE — 86140 C-REACTIVE PROTEIN: CPT

## 2025-02-26 PROCEDURE — 84443 ASSAY THYROID STIM HORMONE: CPT

## 2025-02-26 PROCEDURE — 3077F SYST BP >= 140 MM HG: CPT | Performed by: FAMILY MEDICINE

## 2025-02-26 PROCEDURE — 1126F AMNT PAIN NOTED NONE PRSNT: CPT | Performed by: FAMILY MEDICINE

## 2025-02-26 PROCEDURE — 85027 COMPLETE CBC AUTOMATED: CPT

## 2025-02-26 PROCEDURE — 80061 LIPID PANEL: CPT

## 2025-02-26 PROCEDURE — 82306 VITAMIN D 25 HYDROXY: CPT

## 2025-02-26 RX ORDER — PROPRANOLOL HYDROCHLORIDE 60 MG/1
CAPSULE, EXTENDED RELEASE ORAL
COMMUNITY
Start: 2025-01-30

## 2025-02-26 NOTE — PROGRESS NOTES
"Chief Complaint  Hypertension (Follow up. )    Subjective          Danielle Manuel presents to [facility]  Hypertension  This is a chronic problem. The current episode started more than 1 month ago. The problem has been improved since onset. The problem is controlled. Associated symptoms include headaches. Pertinent negatives include no anxiety, blurred vision, chest pain, malaise/fatigue, orthopnea, palpitations, peripheral edema or shortness of breath. There are no associated agents to hypertension. Risk factors for coronary artery disease include family history and smoking/tobacco exposure. There are no compliance problems.            Objective   Vital Signs:   /98 (BP Location: Left arm, Patient Position: Sitting, Cuff Size: Small Adult)   Pulse 63   Temp 97.8 °F (36.6 °C)   Resp 16   Ht 167.6 cm (66\")   Wt 58.1 kg (128 lb)   SpO2 100%   BMI 20.66 kg/m²     BMI is within normal parameters. No other follow-up for BMI required.      Physical Exam  Vitals reviewed.   Constitutional:       Appearance: Normal appearance. She is well-developed.   HENT:      Head: Normocephalic and atraumatic.      Right Ear: External ear normal.      Left Ear: External ear normal.      Nose: Nose normal.   Eyes:      Conjunctiva/sclera: Conjunctivae normal.      Pupils: Pupils are equal, round, and reactive to light.   Cardiovascular:      Rate and Rhythm: Normal rate.   Pulmonary:      Effort: Pulmonary effort is normal.      Breath sounds: Normal breath sounds.   Abdominal:      General: There is no distension.   Skin:     General: Skin is warm and dry.   Neurological:      Mental Status: She is alert and oriented to person, place, and time. Mental status is at baseline.   Psychiatric:         Mood and Affect: Mood and affect normal.         Behavior: Behavior normal.         Thought Content: Thought content normal.         Judgment: Judgment normal.          Result Review :   The " following data was reviewed by: Teofilo Ordonez DO on 02/26/2025:  Common labs          4/9/2024    14:26   Common Labs   Glucose 83    BUN 7    Creatinine 0.89    Sodium 136    Potassium 4.2    Chloride 101    Calcium 9.3    Albumin 4.3    Total Bilirubin 0.6    Alkaline Phosphatase 104    AST (SGOT) 32    ALT (SGPT) 36    WBC 4.43    Hemoglobin 13.3    Hematocrit 40.6    Platelets 153    Total Cholesterol 217    Triglycerides 62    HDL Cholesterol 82    LDL Cholesterol  124                    Assessment and Plan    Diagnoses and all orders for this visit:    1. Primary hypertension (Primary)  -     Sedimentation rate, automated; Future  -     C-reactive protein; Future  -     C4+C3; Future  -     Lipid panel; Future  -     TSH+Free T4; Future  -     CBC (No Diff); Future  -     Comprehensive metabolic panel; Future  -     PTH, Intact; Future  -     Vitamin D 25 hydroxy; Future    2. Personal history of nicotine dependence  -     CT Chest Low Dose Wo; Future    3. Screening mammogram for breast cancer  -     Mammo Screening Digital Tomosynthesis Bilateral With CAD; Future    4. Primary insomnia  -     Sedimentation rate, automated; Future  -     C-reactive protein; Future  -     C4+C3; Future  -     Lipid panel; Future  -     TSH+Free T4; Future  -     CBC (No Diff); Future  -     Comprehensive metabolic panel; Future  -     PTH, Intact; Future  -     Vitamin D 25 hydroxy; Future    5. Esophageal thickening  -     Sedimentation rate, automated; Future  -     C-reactive protein; Future  -     C4+C3; Future  -     Lipid panel; Future  -     TSH+Free T4; Future  -     CBC (No Diff); Future  -     Comprehensive metabolic panel; Future  -     PTH, Intact; Future  -     Vitamin D 25 hydroxy; Future    6. Chronic pain disorder  -     Sedimentation rate, automated; Future  -     C-reactive protein; Future  -     C4+C3; Future  -     Lipid panel; Future  -     TSH+Free T4; Future  -     CBC (No Diff); Future  -      Comprehensive metabolic panel; Future  -     PTH, Intact; Future  -     Vitamin D 25 hydroxy; Future    7. DERECK (generalized anxiety disorder)  -     Sedimentation rate, automated; Future  -     C-reactive protein; Future  -     C4+C3; Future  -     Lipid panel; Future  -     TSH+Free T4; Future  -     CBC (No Diff); Future  -     Comprehensive metabolic panel; Future  -     PTH, Intact; Future  -     Vitamin D 25 hydroxy; Future    8. Systemic lupus erythematosus, unspecified SLE type, unspecified organ involvement status  -     Sedimentation rate, automated; Future  -     C-reactive protein; Future  -     C4+C3; Future  -     Lipid panel; Future  -     TSH+Free T4; Future  -     CBC (No Diff); Future  -     Comprehensive metabolic panel; Future  -     PTH, Intact; Future  -     Vitamin D 25 hydroxy; Future    9. Vitamin D deficiency  -     Vitamin D 25 hydroxy; Future      Continue medicine for blood pressure slightly elevated today goal less than 140/90 whitecoat hypertension contributing to some elevations in clinic    Continue to monitor labs at least every 3 to 6 months reordered today to be done before next appointment    Continue medicine for anxiety depression controlled Jony reviewed contract on file    Continue Qulipta for migraines follow-up if worsening signs or symptoms      Follow Up   Return in about 3 months (around 5/26/2025), or if symptoms worsen or fail to improve, for Next scheduled follow up, Labs before, Recheck, AWV.  Patient was given instructions and counseling regarding her condition or for health maintenance advice. Please see specific information pulled into the AVS if appropriate.     Transcribed from ambient dictation for Teofilo Ordonez DO by Teofilo Ordonez DO.  02/26/25   08:22 EST

## 2025-02-27 RX ORDER — ERGOCALCIFEROL 1.25 MG/1
50000 CAPSULE, LIQUID FILLED ORAL WEEKLY
Qty: 12 CAPSULE | Refills: 3 | Status: SHIPPED | OUTPATIENT
Start: 2025-02-27

## 2025-03-05 NOTE — TELEPHONE ENCOUNTER
DELETE AFTER REVIEWING: Send this encounter to the appropriate pool. See your Call Action Grid or Workflows for direction.    Caller: [facility]     Relationship: PHARMACY     Best call back number: [phone]    Which medication are you concerned about: JAVIERLIPKAYLA     Who prescribed you this medication: DR. TIFFANI PAYNE     When did you start taking this medication:     What are your concerns: PATIENT IS HAVING A CHANGE IN INSURANCE AND THEY WILL NOT COVER THIS MEDICATION.  THEY WILL COVER NURTEC PATIENT TOLD PHARMACY SHE HAS TAKEN THAT BEFORE, IT WORKS BUT NOT AS WELL AS THE QULIPTA.  PHARMACY NEEDS A SCRIPT FOR THE NURTEC SENT OVER.

## 2025-03-05 NOTE — TELEPHONE ENCOUNTER
Patients insurance will not cover qulipta, patient asking for rx for nurtec.  Pended for you.  Last seen 2/26/25.

## 2025-03-14 ENCOUNTER — HOSPITAL ENCOUNTER (OUTPATIENT)
Dept: CT IMAGING | Facility: HOSPITAL | Age: 65
Discharge: HOME | End: 2025-03-14
Payer: MEDICARE

## 2025-03-14 DIAGNOSIS — Z87.891 PERSONAL HISTORY OF NICOTINE DEPENDENCE: ICD-10-CM

## 2025-03-14 PROCEDURE — 71271 CT THORAX LUNG CANCER SCR C-: CPT

## 2025-04-03 DIAGNOSIS — I77.89 LUPUS VASCULITIS: ICD-10-CM

## 2025-04-03 DIAGNOSIS — M32.9 SYSTEMIC LUPUS ERYTHEMATOSUS, UNSPECIFIED SLE TYPE, UNSPECIFIED ORGAN INVOLVEMENT STATUS: ICD-10-CM

## 2025-04-03 DIAGNOSIS — M32.19 LUPUS VASCULITIS: ICD-10-CM

## 2025-04-03 RX ORDER — METHOTREXATE 2.5 MG/1
TABLET ORAL
Qty: 48 TABLET | Refills: 1 | Status: SHIPPED | OUTPATIENT
Start: 2025-04-03

## 2025-04-10 RX ORDER — CLONIDINE HYDROCHLORIDE 0.2 MG/1
0.2 TABLET ORAL 2 TIMES DAILY PRN
Qty: 60 TABLET | Refills: 1 | Status: SHIPPED | OUTPATIENT
Start: 2025-04-10

## 2025-04-22 ENCOUNTER — OFFICE VISIT (OUTPATIENT)
Dept: FAMILY MEDICINE CLINIC | Facility: CLINIC | Age: 65
End: 2025-04-22
Payer: MEDICARE

## 2025-04-22 VITALS
WEIGHT: 136 LBS | DIASTOLIC BLOOD PRESSURE: 83 MMHG | BODY MASS INDEX: 21.86 KG/M2 | OXYGEN SATURATION: 100 % | SYSTOLIC BLOOD PRESSURE: 126 MMHG | TEMPERATURE: 97.7 F | HEIGHT: 66 IN | HEART RATE: 54 BPM | RESPIRATION RATE: 16 BRPM

## 2025-04-22 DIAGNOSIS — Z00.00 ENCOUNTER FOR SUBSEQUENT ANNUAL WELLNESS VISIT (AWV) IN MEDICARE PATIENT: Primary | ICD-10-CM

## 2025-04-22 DIAGNOSIS — I10 PRIMARY HYPERTENSION: Chronic | ICD-10-CM

## 2025-04-22 DIAGNOSIS — S93.401S MODERATE RIGHT ANKLE SPRAIN, SEQUELA: ICD-10-CM

## 2025-04-22 DIAGNOSIS — M80.00XD AGE-RELATED OSTEOPOROSIS WITH CURRENT PATHOLOGICAL FRACTURE WITH ROUTINE HEALING: ICD-10-CM

## 2025-04-22 DIAGNOSIS — M25.571 ACUTE RIGHT ANKLE PAIN: ICD-10-CM

## 2025-04-22 DIAGNOSIS — F41.1 GAD (GENERALIZED ANXIETY DISORDER): Chronic | ICD-10-CM

## 2025-04-22 DIAGNOSIS — G89.4 CHRONIC PAIN SYNDROME: ICD-10-CM

## 2025-04-22 DIAGNOSIS — M32.19 LUPUS VASCULITIS: Chronic | ICD-10-CM

## 2025-04-22 DIAGNOSIS — I77.89 LUPUS VASCULITIS: Chronic | ICD-10-CM

## 2025-04-22 DIAGNOSIS — G43.701 CHRONIC MIGRAINE W/O AURA, NOT INTRACTABLE, W STAT MIGR: Chronic | ICD-10-CM

## 2025-04-22 DIAGNOSIS — M32.9 SYSTEMIC LUPUS ERYTHEMATOSUS, UNSPECIFIED SLE TYPE, UNSPECIFIED ORGAN INVOLVEMENT STATUS: Chronic | ICD-10-CM

## 2025-04-22 PROBLEM — S93.401A MODERATE RIGHT ANKLE SPRAIN: Status: ACTIVE | Noted: 2025-04-22

## 2025-04-22 RX ORDER — KETOROLAC TROMETHAMINE 10 MG/1
10 TABLET, FILM COATED ORAL EVERY 6 HOURS PRN
Qty: 16 TABLET | Refills: 0 | Status: SHIPPED | OUTPATIENT
Start: 2025-04-22

## 2025-04-22 RX ORDER — NITROFURANTOIN 25; 75 MG/1; MG/1
CAPSULE ORAL
COMMUNITY

## 2025-04-22 NOTE — PROGRESS NOTES
Subjective   The ABCs of the Annual Wellness Visit  Medicare Wellness Visit      Danielle Manuel is a 64 y.o. patient who presents for a Medicare Wellness Visit.    The following portions of the patient's history were reviewed and   updated as appropriate: allergies, current medications, past family history, past medical history, past social history, past surgical history, and problem list.    Compared to one year ago, the patient's physical   health is the same.  Compared to one year ago, the patient's mental   health is the same.    Recent Hospitalizations:  She was not admitted to the hospital during the last year.     Current Medical Providers:  Patient Care Team:  Teofilo Ordonez DO as PCP - General (Family Medicine)  Seamus Naik MD as Consulting Physician (Dermatology)  Vivienne Mcrae PA as Physician Assistant (Pain Medicine)  Jensen Melvin MD as Consulting Physician (Pain Medicine)  Kevin Oneil DPM as Consulting Physician (Podiatry)  Kell Lau PA (Physician Assistant)  Purnima Bazan APRN as Nurse Practitioner (Nurse Practitioner)  Kodak Rey MD as Consulting Physician (Nephrology)  Cleo Dykes APRN as Nurse Practitioner (Pain Medicine)  Nereyda Powers MD as Consulting Physician (Urology)    Outpatient Medications Prior to Visit   Medication Sig Dispense Refill    amLODIPine (NORVASC) 5 MG tablet TAKE 1 TABLET BY MOUTH DAILY 90 tablet 3    Atogepant (Qulipta) 60 MG tablet Take 1 tablet by mouth Daily. 30 tablet 5    calcium carbonate (OS-PATTIE) 1250 (500 Ca) MG tablet Take 1 tablet by mouth Daily As Needed for Indigestion or Heartburn.      cloNIDine (CATAPRES) 0.2 MG tablet TAKE 1 TABLET BY MOUTH 2 TIMES A DAY AS NEEDED FOR HIGH BLOOD PRESSURE 60 tablet 1    desonide (DESOWEN) 0.05 % ointment Apply 1 Application topically to the appropriate area as directed 3 (Three) Times a Day As Needed.      folic acid (FOLVITE) 1 MG  tablet Take 1 tablet by mouth Daily. 90 tablet 3    gabapentin (NEURONTIN) 300 MG capsule Take 1 capsule by mouth 3 (Three) Times a Day. 90 capsule 1    hydroCHLOROthiazide 12.5 MG tablet TAKE 1 TABLET BY MOUTH DAILY 30 tablet 2    meloxicam (MOBIC) 7.5 MG tablet Take 1 tablet by mouth Daily.      methotrexate 2.5 MG tablet TAKE 4 TABLETS BY MOUTH ONCE A WEEK 48 tablet 1    nitrofurantoin, macrocrystal-monohydrate, (MACROBID) 100 MG capsule       Omega-3 Fatty Acids (fish oil) 1000 MG capsule capsule Take 1 capsule by mouth Daily With Breakfast.      ondansetron ODT (ZOFRAN-ODT) 4 MG disintegrating tablet Place 1 tablet on the tongue Every 12 (Twelve) Hours As Needed for Nausea or Vomiting. 15 tablet 0    Otezla 30 MG tablet       propranolol LA (INDERAL LA) 60 MG 24 hr capsule       rimegepant sulfate ODT (Nurtec) 75 MG disintegrating tablet Place 1 tablet under the tongue Daily As Needed (MIGRAINE). 8 tablet 11    tiZANidine (ZANAFLEX) 4 MG tablet Take 1 tablet by mouth 2 (Two) Times a Day As Needed for Muscle Spasms. 20 tablet 0    traMADol (ULTRAM) 50 MG tablet Take 1 tablet by mouth Daily As Needed for Moderate Pain . 30 tablet 2    vitamin D (ERGOCALCIFEROL) 1.25 MG (24717 UT) capsule capsule Take 1 capsule by mouth 1 (One) Time Per Week. 12 capsule 3    ketorolac (TORADOL) 10 MG tablet Take 1 tablet by mouth Every 6 (Six) Hours As Needed for Moderate Pain for up to 4 days. 16 tablet 0     No facility-administered medications prior to visit.     Opioid medication/s are on active medication list.  and I have evaluated her active treatment plan and pain score trends (see table).  Vitals:    04/22/25 0748   PainSc: 0-No pain     I have reviewed the chart for potential of high risk medication and harmful drug interactions in the elderly.        Aspirin is not on active medication list.  Aspirin use is not indicated based on review of current medical condition/s. Risk of harm outweighs potential benefits.   .    Patient Active Problem List   Diagnosis    DERECK (generalized anxiety disorder)    Mixed hyperlipidemia    Insomnia, unspecified    Post menopausal syndrome    Vitamin D deficiency    Age-related osteoporosis without current pathological fracture    Systemic lupus erythematosus, unspecified    Chronic pain disorder    Cervical radiculopathy    Cracked skin on feet    Cellulitis of left lower extremity    Lupus vasculitis    Encounter for long-term (current) use of insulin    Idiopathic neuropathy    Emphysema, unspecified    Immunosuppression due to drug therapy    Compression fracture of T9 vertebra with routine healing    Pulmonary nodule, left    Esophageal thickening    Abnormal finding on GI tract imaging    DDD (degenerative disc disease), cervical    Palpitations    Long term current use of therapeutic drug    Myofascial pain    Hyponatremia    Gastroesophageal reflux disease without esophagitis    Body mass index (BMI) of 22.0-22.9 in adult    Bilateral foot pain    Swelling of both lower extremities    Hydronephrosis    Neuropathy of both feet    OAB (overactive bladder)    Idiopathic osteoarthritis    Tobacco abuse counseling    Gastroesophageal reflux disease    Hyponatremia    Lupus    Chronic pain of both knees    Dry mouth    Sprain of temporomandibular joint or ligament    Cervicogenic headache    Primary hypertension    New daily persistent headache    Recurrent sinus infections    Chronic migraine w/o aura, not intractable, w stat migr    Thoracic radiculopathy    Moderate right ankle sprain    Acute right ankle pain     Advance Care Planning Advance Directive is on file.  ACP discussion was declined by the patient. Patient has an advance directive in EMR which is still valid.             Objective   Vitals:    04/22/25 0748   BP: 126/83   BP Location: Right arm   Patient Position: Sitting   Cuff Size: Adult   Pulse: 54   Resp: 16   Temp: 97.7 °F (36.5 °C)   SpO2: 100%   Weight: 61.7 kg (136 lb)  "  Height: 167.6 cm (66\")   PainSc: 0-No pain       Estimated body mass index is 21.95 kg/m² as calculated from the following:    Height as of this encounter: 167.6 cm (66\").    Weight as of this encounter: 61.7 kg (136 lb).    BMI is within normal parameters. No other follow-up for BMI required.           Does the patient have evidence of cognitive impairment? No  Lab Results   Component Value Date    TRIG 67 2025    HDL 82 (H) 2025     (H) 2025    VLDL 11 2025                                                                                                Health  Risk Assessment    Smoking Status:  Social History     Tobacco Use   Smoking Status Every Day    Current packs/day: 0.50    Average packs/day: 0.5 packs/day for 39.3 years (19.7 ttl pk-yrs)    Types: Cigarettes    Start date: 1986    Passive exposure: Past   Smokeless Tobacco Never     Alcohol Consumption:  Social History     Substance and Sexual Activity   Alcohol Use Not Currently    Alcohol/week: 2.0 standard drinks of alcohol    Comment: CURRENT SOME DAY, DRINKS LESS THAN 1 DRINK PER DAY, HAS BEEN DRINKING FOR 21-30 YEARS        Fall Risk Screen  [facility] Fall Risk Assessment was completed, and patient is at LOW risk for falls.Assessment completed on:2025    Depression Screening   Little interest or pleasure in doing things? Not at all   Feeling down, depressed, or hopeless? Not at all   PHQ-2 Total Score 0      Health Habits and Functional and Cognitive Screenin/22/2025     7:46 AM   Functional & Cognitive Status   Do you have difficulty preparing food and eating? No   Do you have difficulty bathing yourself, getting dressed or grooming yourself? No   Do you have difficulty using the toilet? No   Do you have difficulty moving around from place to place? No   Do you have trouble with steps or getting out of a bed or a chair? No   Current Diet Well Balanced Diet   Dental Exam Not up to date   Eye Exam Not up " to date   Exercise (times per week) 5 times per week   Current Exercises Include Walking   Do you need help using the phone?  No   Are you deaf or do you have serious difficulty hearing?  Yes   Do you need help to go to places out of walking distance? No   Do you need help shopping? No   Do you need help preparing meals?  No   Do you need help with housework?  No   Do you need help with laundry? No   Do you need help taking your medications? No   Do you need help managing money? No   Do you ever drive or ride in a car without wearing a seat belt? No   Have you felt unusual stress, anger or loneliness in the last month? No   Who do you live with? Alone   If you need help, do you have trouble finding someone available to you? No   Have you been bothered in the last four weeks by sexual problems? No   Do you have difficulty concentrating, remembering or making decisions? No           Age-appropriate Screening Schedule:  Refer to the list below for future screening recommendations based on patient's age, sex and/or medical conditions. Orders for these recommended tests are listed in the plan section. The patient has been provided with a written plan.    Health Maintenance List  Health Maintenance   Topic Date Due    Pneumococcal Vaccine 50+ (1 of 2 - PCV) Never done    TDAP/TD VACCINES (1 - Tdap) Never done    ZOSTER VACCINE (1 of 2) Never done    COVID-19 Vaccine (4 - 2024-25 season) 09/01/2024    COLORECTAL CANCER SCREENING  04/02/2025    MAMMOGRAM  05/16/2025    INFLUENZA VACCINE  07/01/2025    LIPID PANEL  02/26/2026    ANNUAL WELLNESS VISIT  04/22/2026    HEPATITIS C SCREENING  Completed    LUNG CANCER SCREENING  Discontinued                                                                                                                                                CMS Preventative Services Quick Reference  Risk Factors Identified During Encounter  reviewed    The above risks/problems have been discussed with the  patient.  Pertinent information has been shared with the patient in the After Visit Summary.  An After Visit Summary and PPPS were made available to the patient.    Follow Up:   Next Medicare Wellness visit to be scheduled in 1 year.          Additional E&M Note during same encounter follows:  Patient has multiple medical problems which are significant and separately identifiable that require additional work above and beyond the Medicare Wellness Visit.      Chief Complaint  Hypertension (Follow up. ) and Medicare Wellness-subsequent    Danielle Manuel is a 64 y.o. female who presents to [facility]     History of Present Illness  The patient is a 64-year-old female presenting for a Medicare wellness visit and follow-up on chronic conditions, including hypertension and migraines.    Blood pressure is well controlled at 126/83 today. Medications include amlodipine, HCTZ, and clonidine for hypertension. Nurtec is used as needed for migraines, and propranolol is also taken.    Psoriasis is managed with Otezla, and she follows with a specialist for lupus.    Approximately 2 months ago, a sliding incident occurred while descending from a stepstool, resulting in a soft tissue injury to the right foot. The pain has progressively worsened, leading to an urgent care visit on 04/18/2025. Swelling is reported in the affected area, but no twisting motion was involved during the incident. Despite being advised to wear a boot, the duration was not specified. Pain intensifies at night, disrupting sleep. Toradol was prescribed for 4 days and reported as effective, though it causes dry mouth. Tramadol was ineffective for pain management. She has not recently consulted with her pain management specialist and has not been taking tizanidine regularly.    Laboratory results from 02/26/2025 were reviewed. Vitamin D was 19.1, and a supplement was recommended. Potassium was slightly low at 3.4. Kidney  "function and liver enzymes were fairly good, with no issues in C3, C4, or elevation in inflammatory markers. The lipid panel showed an LDL of 144 and an HDL of 88, indicating a good ratio.    Objective   Vital Signs:   Vitals:    04/22/25 0748   BP: 126/83   BP Location: Right arm   Patient Position: Sitting   Cuff Size: Adult   Pulse: 54   Resp: 16   Temp: 97.7 °F (36.5 °C)   SpO2: 100%   Weight: 61.7 kg (136 lb)   Height: 167.6 cm (66\")   PainSc: 0-No pain       Wt Readings from Last 3 Encounters:   04/22/25 61.7 kg (136 lb)   04/18/25 56.7 kg (125 lb)   02/26/25 58.1 kg (128 lb)     BP Readings from Last 3 Encounters:   04/22/25 126/83   04/18/25 (!) 153/103   02/26/25 148/98       Physical Exam  Vitals reviewed.   Constitutional:       Appearance: Normal appearance. She is well-developed.   HENT:      Head: Normocephalic and atraumatic.      Right Ear: External ear normal.      Left Ear: External ear normal.      Nose: Nose normal.   Eyes:      Conjunctiva/sclera: Conjunctivae normal.      Pupils: Pupils are equal, round, and reactive to light.   Cardiovascular:      Rate and Rhythm: Normal rate.   Pulmonary:      Effort: Pulmonary effort is normal.      Breath sounds: Normal breath sounds.   Abdominal:      General: There is no distension.   Musculoskeletal:      Right ankle: Tenderness present. Decreased range of motion.      Comments: In boot    Skin:     General: Skin is warm and dry.   Neurological:      Mental Status: She is alert and oriented to person, place, and time. Mental status is at baseline.   Psychiatric:         Mood and Affect: Mood and affect normal.         Behavior: Behavior normal.         Thought Content: Thought content normal.         Judgment: Judgment normal.         Physical Exam  Extremities: Swelling noted in the right foot.    The following data was reviewed by Teofilo Ordonez DO on 04/22/2025  Common Labs   Common labs          2/26/2025    08:46   Common Labs   Glucose 86    BUN 8  "   Creatinine 0.75    Sodium 134    Potassium 3.4    Chloride 91    Calcium 9.8    Albumin 4.0    Total Bilirubin 0.9    Alkaline Phosphatase 120    AST (SGOT) 42    ALT (SGPT) 28    WBC 4.48    Hemoglobin 14.0    Hematocrit 42.4    Platelets 222    Total Cholesterol 237    Triglycerides 67    HDL Cholesterol 82    LDL Cholesterol  144        Results  Labs   - Vitamin D: 02/26/2025, 19.1 ng/mL   - Potassium: 02/26/2025, 3.4 mmol/L   - Kidney function: 02/26/2025, Normal   - Liver enzymes: 02/26/2025, Normal   - C3 and C4: 02/26/2025, Normal   - Inflammatory markers: 02/26/2025, Normal   - Lipid panel: 02/26/2025,  mg/dL, HDL 88 mg/dL        Assessment & Plan   Diagnoses and all orders for this visit:    1. Encounter for subsequent annual wellness visit (AWV) in Medicare patient (Primary)    2. Lupus vasculitis    3. Systemic lupus erythematosus, unspecified SLE type, unspecified organ involvement status    4. Primary hypertension    5. DERECK (generalized anxiety disorder)    6. Age-related osteoporosis with current pathological fracture with routine healing    7. Chronic pain syndrome    8. Chronic migraine w/o aura, not intractable, w stat migr    9. Acute right ankle pain  -     ketorolac (TORADOL) 10 MG tablet; Take 1 tablet by mouth Every 6 (Six) Hours As Needed for Moderate Pain.  Dispense: 16 tablet; Refill: 0    10. Moderate right ankle sprain, sequela        Assessment & Plan  1. Hypertension.  - Blood pressure is well controlled at 126/83 today.  - Improvement noted in managing hypertension.  - Current medications include amlodipine, HCTZ, and clonidine.  - Continued monitoring of blood pressure and medication effectiveness.    2. Migraines.  - Nurtec is used as needed for headaches or migraines.  - Propranolol is taken additionally.  - Effectiveness of current migraine management discussed.  - Continued use of prescribed medications for migraine control.    3. Psoriasis.  - Otezla is prescribed for  psoriasis.  - Follows with a specialist for lupus.  - Review of current treatment and specialist follow-up.  - Continued management with Otezla and specialist care.    4. Right foot pain.  - Reported fall from a stepstool about two months ago, resulting in worsening pain in the right foot.  - Diagnosed with a soft tissue injury at urgent care last Friday.  - Given a boot to wear and prescribed Toradol for 4 days.  - Tramadol has not been effective for pain relief.  - Advised to wear the boot for a couple of weeks, especially when moving around.  - Refill for Toradol provided.  - Recommended consultation with pain management specialist regarding alternative pain medications if Toradol is not sufficient.     XR Ankle 3+ View Right (04/18/2025 08:45)   some soft tissue swelling at the medial and anterior ankle     Reviewed AWV recommendations and ordered as appropriate, follow up annually for review, sooner if concerns    No depression, falls, dementia symptoms or other  Risk and home safety assessment good    Followed up on chronic conditions and ordered refills, appropriate monitoring labs additionally as needed every 6 months or sooner if indicated, controlled stable    Follow up at least every 3-6 months for chronic conditions and as scheduled with appropriate specialists as indicated otherwise      BMI is within normal parameters. No other follow-up for BMI required.       FOLLOW UP  Return in about 1 week (around 4/29/2025), or if symptoms worsen or fail to improve, for Next scheduled follow up, Recheck, Labs before.  Patient was given instructions and counseling regarding her condition or for health maintenance advice. Please see specific information pulled into the AVS if appropriate.     Patient or patient representative verbalized consent for the use of Ambient Listening during the visit with  Teofilo Ordonez DO for chart documentation. 4/22/2025  08:11 EDT    Teofilo Ordonez DO  04/22/25  08:11 EDT

## 2025-04-29 ENCOUNTER — OFFICE VISIT (OUTPATIENT)
Dept: FAMILY MEDICINE CLINIC | Facility: CLINIC | Age: 65
End: 2025-04-29
Payer: MEDICARE

## 2025-04-29 VITALS
RESPIRATION RATE: 16 BRPM | HEART RATE: 68 BPM | DIASTOLIC BLOOD PRESSURE: 99 MMHG | SYSTOLIC BLOOD PRESSURE: 145 MMHG | HEIGHT: 66 IN | TEMPERATURE: 97.8 F | BODY MASS INDEX: 21.34 KG/M2 | WEIGHT: 132.8 LBS | OXYGEN SATURATION: 100 %

## 2025-04-29 DIAGNOSIS — I10 PRIMARY HYPERTENSION: Primary | Chronic | ICD-10-CM

## 2025-04-29 DIAGNOSIS — M80.00XD AGE-RELATED OSTEOPOROSIS WITH CURRENT PATHOLOGICAL FRACTURE WITH ROUTINE HEALING: ICD-10-CM

## 2025-04-29 DIAGNOSIS — F51.01 PRIMARY INSOMNIA: Chronic | ICD-10-CM

## 2025-04-29 DIAGNOSIS — S93.401S MODERATE RIGHT ANKLE SPRAIN, SEQUELA: ICD-10-CM

## 2025-04-29 DIAGNOSIS — I77.89 LUPUS VASCULITIS: Chronic | ICD-10-CM

## 2025-04-29 DIAGNOSIS — G43.701 CHRONIC MIGRAINE W/O AURA, NOT INTRACTABLE, W STAT MIGR: Chronic | ICD-10-CM

## 2025-04-29 DIAGNOSIS — F41.1 GAD (GENERALIZED ANXIETY DISORDER): Chronic | ICD-10-CM

## 2025-04-29 DIAGNOSIS — M32.19 LUPUS VASCULITIS: Chronic | ICD-10-CM

## 2025-04-29 RX ORDER — OLMESARTAN MEDOXOMIL AND HYDROCHLOROTHIAZIDE 20/12.5 20; 12.5 MG/1; MG/1
1 TABLET ORAL DAILY
Qty: 30 TABLET | Refills: 11 | Status: SHIPPED | OUTPATIENT
Start: 2025-04-29 | End: 2026-04-29

## 2025-04-29 RX ORDER — CLONAZEPAM 1 MG/1
TABLET ORAL
COMMUNITY
End: 2025-04-29

## 2025-04-29 RX ORDER — TIOTROPIUM BROMIDE 18 UG/1
CAPSULE ORAL; RESPIRATORY (INHALATION)
COMMUNITY

## 2025-04-29 RX ORDER — OXYBUTYNIN CHLORIDE 5 MG/1
TABLET, EXTENDED RELEASE ORAL
COMMUNITY
End: 2025-04-29

## 2025-04-29 NOTE — PROGRESS NOTES
Chief Complaint  Ankle Injury (Pt following up on Right ankle injury. ) and Hypertension    Subjective          Danielle Manuel presents to [facility]  Ankle Injury   Pertinent negatives include no inability to bear weight, numbness or tingling.   Hypertension  Extremity Pain  Injury: no    Progression since onset:  Improved  Pain location:  Right ankle  Pain quality:  Other  Pain - numeric:  2/10  Associated symptoms: lower extremity swelling    Associated symptoms: no inability to bear weight, no numbness, no tingling and no redness        History of Present Illness  The patient is a 64-year-old female here for a 1-week follow-up. She was recently seen for an annual wellness visit. Follow-up today is for continued issues with her right ankle. Advised to stay in the boot for a little longer after experiencing significant pain and strain in the right ankle. Blood pressure is mildly elevated at 149/99. Medication is taken as prescribed, and blood pressure has been fairly well controlled in the clinic, although it fluctuates at different times or situations. Overall, it is much better controlled at home. She is on Qulipta for recurrent migraines and Mobic as needed for arthritis, along with additional tramadol.    A slight improvement in her ankle condition is reported, but she has been unable to obtain a refill of her Toradol prescription. Discontinuation of the boot occurred due to discomfort and heat. The pharmacist informed her that Toradol could not be refilled without another injection, which was not received during her urgent care visit. Relief from muscle relaxers has been noted, alleviating her throbbing pain.    Blood pressure, which was previously well-managed, has recently increased. Amlodipine is taken in the morning, and blood pressure is checked in the afternoon before picking up her grandson from school, remaining within normal limits. However, elevated readings are  "observed late at night, prompting the use of clonidine. Two HCTZ tablets are taken in the morning, resulting in frequent urination.      Objective   Vital Signs:   /99 (BP Location: Left arm, Patient Position: Sitting, Cuff Size: Small Adult)   Pulse 68   Temp 97.8 °F (36.6 °C)   Resp 16   Ht 167.6 cm (66\")   Wt 60.2 kg (132 lb 12.8 oz)   SpO2 100%   BMI 21.43 kg/m²       Physical Exam  Vitals reviewed.   Constitutional:       Appearance: Normal appearance. She is well-developed.   HENT:      Head: Normocephalic and atraumatic.      Right Ear: External ear normal.      Left Ear: External ear normal.      Nose: Nose normal.   Eyes:      Conjunctiva/sclera: Conjunctivae normal.      Pupils: Pupils are equal, round, and reactive to light.   Cardiovascular:      Rate and Rhythm: Normal rate.   Pulmonary:      Effort: Pulmonary effort is normal.      Breath sounds: Normal breath sounds.   Abdominal:      General: There is no distension.   Musculoskeletal:      Right ankle: Tenderness present. Decreased range of motion.      Comments: In boot    Skin:     General: Skin is warm and dry.   Neurological:      Mental Status: She is alert and oriented to person, place, and time. Mental status is at baseline.   Psychiatric:         Mood and Affect: Mood and affect normal.         Behavior: Behavior normal.         Thought Content: Thought content normal.         Judgment: Judgment normal.          Result Review :   The following data was reviewed by: Teofilo Ordonez DO on 04/29/2025:  Common labs          2/26/2025    08:46   Common Labs   Glucose 86    BUN 8    Creatinine 0.75    Sodium 134    Potassium 3.4    Chloride 91    Calcium 9.8    Albumin 4.0    Total Bilirubin 0.9    Alkaline Phosphatase 120    AST (SGOT) 42    ALT (SGPT) 28    WBC 4.48    Hemoglobin 14.0    Hematocrit 42.4    Platelets 222    Total Cholesterol 237    Triglycerides 67    HDL Cholesterol 82    LDL Cholesterol  144       "     Results                   Assessment and Plan    Diagnoses and all orders for this visit:    1. Primary hypertension (Primary)    2. Lupus vasculitis    3. DERECK (generalized anxiety disorder)    4. Age-related osteoporosis with current pathological fracture with routine healing    5. Chronic migraine w/o aura, not intractable, w stat migr    6. Primary insomnia    7. Moderate right ankle sprain, sequela    Other orders  -     olmesartan-hydrochlorothiazide (BENICAR HCT) 20-12.5 MG per tablet; Take 1 tablet by mouth Daily.  Dispense: 30 tablet; Refill: 11        Assessment & Plan  1. Right ankle pain.  - Continued pain in the right ankle reported.  - Advised to wear supportive footwear, such as sandals with ankle support or an air cast.  - Physical exam reveals flat feet and mild swelling.  - Prescription for Toradol will be provided to manage the pain.    2. Hypertension.  - Blood pressure readings have been inconsistent, with higher readings noted in the evening.  - Currently taking amlodipine and HCTZ.  - Advised to monitor blood pressure at home.  - Dosage of HCTZ will be adjusted to include another medication, creating a combination pill to be taken once daily in the morning. If the new regimen causes increased urination, the dose may be reduced by half.          Follow Up   Return in about 4 weeks (around 5/27/2025), or if symptoms worsen or fail to improve, for Next scheduled follow up, Recheck.    Patient was given instructions and counseling regarding her condition or for health maintenance advice. Please see specific information pulled into the AVS if appropriate.       Transcribed from ambient dictation for Teofilo Ordonez DO by Teofilo Ordonez DO.  04/29/25   14:47 EDT    Patient or patient representative verbalized consent for the use of Ambient Listening during the visit with  Teofilo Ordonez DO for chart documentation. 4/29/2025  15:22 EDT

## 2025-05-02 ENCOUNTER — HOSPITAL ENCOUNTER (OUTPATIENT)
Dept: MAMMOGRAPHY | Facility: HOSPITAL | Age: 65
Discharge: HOME | End: 2025-05-02
Admitting: FAMILY MEDICINE
Payer: MEDICARE

## 2025-05-02 DIAGNOSIS — Z12.31 SCREENING MAMMOGRAM FOR BREAST CANCER: ICD-10-CM

## 2025-05-02 PROCEDURE — 77063 BREAST TOMOSYNTHESIS BI: CPT

## 2025-05-02 PROCEDURE — 77067 SCR MAMMO BI INCL CAD: CPT

## 2025-05-02 RX ORDER — PROPRANOLOL HYDROCHLORIDE 60 MG/1
60 CAPSULE, EXTENDED RELEASE ORAL DAILY
Qty: 90 CAPSULE | Refills: 0 | Status: SHIPPED | OUTPATIENT
Start: 2025-05-02

## 2025-05-27 ENCOUNTER — OFFICE VISIT (OUTPATIENT)
Dept: FAMILY MEDICINE CLINIC | Facility: CLINIC | Age: 65
End: 2025-05-27
Payer: MEDICARE

## 2025-05-27 VITALS
OXYGEN SATURATION: 100 % | HEIGHT: 66 IN | RESPIRATION RATE: 16 BRPM | DIASTOLIC BLOOD PRESSURE: 87 MMHG | SYSTOLIC BLOOD PRESSURE: 121 MMHG | HEART RATE: 59 BPM | WEIGHT: 133.4 LBS | BODY MASS INDEX: 21.44 KG/M2 | TEMPERATURE: 98 F

## 2025-05-27 DIAGNOSIS — I10 PRIMARY HYPERTENSION: Primary | Chronic | ICD-10-CM

## 2025-05-27 DIAGNOSIS — F41.1 GAD (GENERALIZED ANXIETY DISORDER): ICD-10-CM

## 2025-05-27 DIAGNOSIS — G43.701 CHRONIC MIGRAINE W/O AURA, NOT INTRACTABLE, W STAT MIGR: Chronic | ICD-10-CM

## 2025-05-27 DIAGNOSIS — F51.01 PRIMARY INSOMNIA: ICD-10-CM

## 2025-05-27 PROCEDURE — 1159F MED LIST DOCD IN RCRD: CPT | Performed by: FAMILY MEDICINE

## 2025-05-27 PROCEDURE — 3074F SYST BP LT 130 MM HG: CPT | Performed by: FAMILY MEDICINE

## 2025-05-27 PROCEDURE — 1160F RVW MEDS BY RX/DR IN RCRD: CPT | Performed by: FAMILY MEDICINE

## 2025-05-27 PROCEDURE — 99214 OFFICE O/P EST MOD 30 MIN: CPT | Performed by: FAMILY MEDICINE

## 2025-05-27 PROCEDURE — 3079F DIAST BP 80-89 MM HG: CPT | Performed by: FAMILY MEDICINE

## 2025-05-27 PROCEDURE — 1126F AMNT PAIN NOTED NONE PRSNT: CPT | Performed by: FAMILY MEDICINE

## 2025-05-27 NOTE — PROGRESS NOTES
"Chief Complaint  Hypertension (Follow up.)    Subjective          Danielle Manuel presents to [facility]  Hypertension  Chronicity:  Chronic  Onset:  More than 1 month ago  Progression since onset:  Unchanged  Condition status:  Resistant  Associated symptoms: anxiety, blurred vision, headaches, malaise/fatigue and peripheral edema    Associated symptoms: no chest pain, no orthopnea, no palpitations and no shortness of breath    Agents associated with hypertension:  No associated agents  Compliance problems:  Diet      History of Present Illness  The patient presents for evaluation of ear pressure and postprandial rib cage pain.    She reports experiencing a sensation akin to fluid movement in her ears, predominantly during the night. This sensation is particularly noticeable when she lies on her side, often described as feeling like an ocean. Despite this, there is no discharge from the ears. The discomfort does not consistently subside upon changing her sleeping position. Additionally, she mentions occasional episodes of significant pressure in the ears.    She also experiences pain in the upper region of her rib cage following meals, even when she does not overeat. She recalls an instance where consumption of two barbecue sandwiches resulted in severe discomfort. She does not report any associated belching.      Objective   Vital Signs:   /87 (BP Location: Left arm, Patient Position: Sitting, Cuff Size: Adult)   Pulse 59   Temp 98 °F (36.7 °C)   Resp 16   Ht 167.6 cm (66\")   Wt 60.5 kg (133 lb 6.4 oz)   SpO2 100%   BMI 21.53 kg/m²       Physical Exam  Vitals reviewed.   Constitutional:       Appearance: Normal appearance. She is well-developed.   HENT:      Head: Normocephalic and atraumatic.      Right Ear: External ear normal.      Left Ear: External ear normal.      Nose: Nose normal.   Eyes:      Conjunctiva/sclera: Conjunctivae normal.      Pupils: Pupils are " equal, round, and reactive to light.   Cardiovascular:      Rate and Rhythm: Normal rate.   Pulmonary:      Effort: Pulmonary effort is normal.      Breath sounds: Normal breath sounds.   Abdominal:      General: There is no distension.   Skin:     General: Skin is warm and dry.   Neurological:      Mental Status: She is alert and oriented to person, place, and time. Mental status is at baseline.   Psychiatric:         Mood and Affect: Mood and affect normal.         Behavior: Behavior normal.         Thought Content: Thought content normal.         Judgment: Judgment normal.          Result Review :   The following data was reviewed by: Teofilo Ordonez DO on 05/27/2025:  Common labs          2/26/2025    08:46   Common Labs   Glucose 86    BUN 8    Creatinine 0.75    Sodium 134    Potassium 3.4    Chloride 91    Calcium 9.8    Albumin 4.0    Total Bilirubin 0.9    Alkaline Phosphatase 120    AST (SGOT) 42    ALT (SGPT) 28    WBC 4.48    Hemoglobin 14.0    Hematocrit 42.4    Platelets 222    Total Cholesterol 237    Triglycerides 67    HDL Cholesterol 82    LDL Cholesterol  144           Results                   Assessment and Plan    Diagnoses and all orders for this visit:    1. Primary hypertension (Primary)    2. Primary insomnia    3. DERECK (generalized anxiety disorder)    4. Chronic migraine w/o aura, not intractable, w stat migr        Assessment & Plan  1. Ear pressure.  - Reports sensation of fluid or wax in the ears, particularly noticeable at night when lying on the side.  - Physical exam findings not discussed.  - Advised to try nasal exhalation to alleviate the pressure; further evaluation if symptoms worsen.  - No medications or therapies prescribed at this time.    2. Postprandial rib cage pain.  - Experiences pain at the top of the rib cage after eating, especially when consuming larger meals.  - Physical exam findings not discussed.  - Reassured that the pain is likely due to increased pressure on the  diaphragm from stomach distension.  - No medications or therapies prescribed at this time.    3. HTN  Controlled continue medicine as prescribed    4. Migraines  Controlled continue medicines as prescribed        Follow Up   Return in about 3 months (around 8/27/2025), or if symptoms worsen or fail to improve, for Next scheduled follow up, Recheck.    Patient was given instructions and counseling regarding her condition or for health maintenance advice. Please see specific information pulled into the AVS if appropriate.       Transcribed from ambient dictation for Teofilo Ordonez DO by Teofilo Ordonez DO.  05/27/25   08:18 EDT    Patient or patient representative verbalized consent for the use of Ambient Listening during the visit with  Teofilo Ordonez DO for chart documentation. 5/29/2025  09:03 EDT

## 2025-06-06 RX ORDER — CLONIDINE HYDROCHLORIDE 0.2 MG/1
TABLET ORAL
Qty: 60 TABLET | Refills: 1 | Status: SHIPPED | OUTPATIENT
Start: 2025-06-06

## 2025-07-15 ENCOUNTER — OFFICE VISIT (OUTPATIENT)
Dept: FAMILY MEDICINE CLINIC | Facility: CLINIC | Age: 65
End: 2025-07-15
Payer: MEDICARE

## 2025-07-15 VITALS
HEIGHT: 66 IN | WEIGHT: 131.8 LBS | DIASTOLIC BLOOD PRESSURE: 97 MMHG | OXYGEN SATURATION: 100 % | TEMPERATURE: 98 F | SYSTOLIC BLOOD PRESSURE: 153 MMHG | HEART RATE: 64 BPM | BODY MASS INDEX: 21.18 KG/M2 | RESPIRATION RATE: 16 BRPM

## 2025-07-15 DIAGNOSIS — M79.672 BILATERAL FOOT PAIN: Chronic | ICD-10-CM

## 2025-07-15 DIAGNOSIS — M79.671 BILATERAL FOOT PAIN: Chronic | ICD-10-CM

## 2025-07-15 DIAGNOSIS — M25.562 ACUTE PAIN OF LEFT KNEE: ICD-10-CM

## 2025-07-15 DIAGNOSIS — M50.30 DDD (DEGENERATIVE DISC DISEASE), CERVICAL: Primary | ICD-10-CM

## 2025-07-15 DIAGNOSIS — M32.9 SYSTEMIC LUPUS ERYTHEMATOSUS, UNSPECIFIED SLE TYPE, UNSPECIFIED ORGAN INVOLVEMENT STATUS: ICD-10-CM

## 2025-07-15 DIAGNOSIS — M32.19 LUPUS VASCULITIS: ICD-10-CM

## 2025-07-15 DIAGNOSIS — K21.9 GASTROESOPHAGEAL REFLUX DISEASE WITHOUT ESOPHAGITIS: ICD-10-CM

## 2025-07-15 DIAGNOSIS — G60.9 IDIOPATHIC NEUROPATHY: Chronic | ICD-10-CM

## 2025-07-15 DIAGNOSIS — E55.9 VITAMIN D DEFICIENCY: ICD-10-CM

## 2025-07-15 DIAGNOSIS — I10 PRIMARY HYPERTENSION: ICD-10-CM

## 2025-07-15 DIAGNOSIS — I77.89 LUPUS VASCULITIS: ICD-10-CM

## 2025-07-15 DIAGNOSIS — G89.4 CHRONIC PAIN DISORDER: ICD-10-CM

## 2025-07-15 DIAGNOSIS — M80.00XD AGE-RELATED OSTEOPOROSIS WITH CURRENT PATHOLOGICAL FRACTURE WITH ROUTINE HEALING: ICD-10-CM

## 2025-07-15 PROCEDURE — 1159F MED LIST DOCD IN RCRD: CPT | Performed by: FAMILY MEDICINE

## 2025-07-15 PROCEDURE — 1125F AMNT PAIN NOTED PAIN PRSNT: CPT | Performed by: FAMILY MEDICINE

## 2025-07-15 PROCEDURE — 3080F DIAST BP >= 90 MM HG: CPT | Performed by: FAMILY MEDICINE

## 2025-07-15 PROCEDURE — 99214 OFFICE O/P EST MOD 30 MIN: CPT | Performed by: FAMILY MEDICINE

## 2025-07-15 PROCEDURE — 3077F SYST BP >= 140 MM HG: CPT | Performed by: FAMILY MEDICINE

## 2025-07-15 PROCEDURE — 1160F RVW MEDS BY RX/DR IN RCRD: CPT | Performed by: FAMILY MEDICINE

## 2025-07-15 RX ORDER — OXYCODONE AND ACETAMINOPHEN 10; 325 MG/1; MG/1
1 TABLET ORAL EVERY 6 HOURS PRN
Qty: 12 TABLET | Refills: 0 | Status: SHIPPED | OUTPATIENT
Start: 2025-07-15

## 2025-07-15 RX ORDER — PREDNISONE 20 MG/1
TABLET ORAL
Qty: 42 TABLET | Refills: 0 | Status: SHIPPED | OUTPATIENT
Start: 2025-07-15 | End: 2025-08-05

## 2025-07-15 NOTE — PROGRESS NOTES
"Chief Complaint  Knee Pain (Pt was recently seen at  for Left knee pain and swelling. Was given Toradol shot and prednisone. This provided relief for swelling but is still experiencing pain. Difficulty barring weight. X2 weeks)    Subjective          Danielle Manuel presents to [facility]  Knee Pain       Knee  Symptoms are: new.   Onset was in the past 7 days.   Symptoms occur: constantly.  Symptoms include: joint pain.   Pertinent negative symptoms include no abdominal pain, no anorexia, no change in stool, no chills, no congestion, no cough, no diaphoresis, no fatigue and no fever.   Treatment and/or Medications comments include: Prednisone       History of Present Illness    Patient presents with knee pain recently presented to urgent care with same pain and swelling worse at that time was given a Toradol injection and prednisone did not relieve much except for the swelling has a difficulty bearing weight going on for 2 weeks without significant improvement    Objective   Vital Signs:   /97 (BP Location: Right arm, Patient Position: Sitting, Cuff Size: Adult)   Pulse 64   Temp 98 °F (36.7 °C)   Resp 16   Ht 167.6 cm (66\")   Wt 59.8 kg (131 lb 12.8 oz)   SpO2 100%   BMI 21.27 kg/m²       Physical Exam  Vitals reviewed.   Constitutional:       Appearance: Normal appearance. She is well-developed.   HENT:      Head: Normocephalic and atraumatic.      Right Ear: External ear normal.      Left Ear: External ear normal.      Nose: Nose normal.   Eyes:      Conjunctiva/sclera: Conjunctivae normal.      Pupils: Pupils are equal, round, and reactive to light.   Cardiovascular:      Rate and Rhythm: Normal rate.   Pulmonary:      Effort: Pulmonary effort is normal.      Breath sounds: Normal breath sounds.   Abdominal:      General: There is no distension.   Musculoskeletal:         General: Tenderness present.      Left knee: Crepitus present. Decreased range of motion. " Tenderness present.   Skin:     General: Skin is warm and dry.      Findings: Erythema present.   Neurological:      Mental Status: She is alert and oriented to person, place, and time. Mental status is at baseline.      Motor: Weakness present.   Psychiatric:         Mood and Affect: Mood and affect normal.         Behavior: Behavior normal.         Thought Content: Thought content normal.         Judgment: Judgment normal.          Result Review :   The following data was reviewed by: Teofilo Ordonez DO on 07/15/2025:  Common labs          2/26/2025    08:46   Common Labs   Glucose 86    BUN 8    Creatinine 0.75    Sodium 134    Potassium 3.4    Chloride 91    Calcium 9.8    Albumin 4.0    Total Bilirubin 0.9    Alkaline Phosphatase 120    AST (SGOT) 42    ALT (SGPT) 28    WBC 4.48    Hemoglobin 14.0    Hematocrit 42.4    Platelets 222    Total Cholesterol 237    Triglycerides 67    HDL Cholesterol 82    LDL Cholesterol  144      Data reviewed : Radiologic studies XR left knee 7/12/2025      Osteopenia otherwise normal no significant joint space narrowing or other    XR Knee 3 View Left (07/12/2025 10:40)     Results                   Assessment and Plan    Diagnoses and all orders for this visit:    1. DDD (degenerative disc disease), cervical (Primary)  -     C4+C3; Standing  -     Urinalysis With Microscopic - Urine, Clean Catch; Standing  -     Sedimentation Rate; Standing  -     C-reactive Protein; Standing  -     TSH+Free T4; Standing  -     Vitamin D,25-Hydroxy; Standing  -     Lipid Panel; Standing  -     PTH, Intact; Standing  -     Uric acid; Future    2. Chronic pain disorder    3. Systemic lupus erythematosus, unspecified SLE type, unspecified organ involvement status  -     C4+C3; Standing  -     Urinalysis With Microscopic - Urine, Clean Catch; Standing  -     Sedimentation Rate; Standing  -     C-reactive Protein; Standing  -     TSH+Free T4; Standing  -     Vitamin D,25-Hydroxy; Standing  -     Lipid  Panel; Standing  -     PTH, Intact; Standing  -     Uric acid; Future    4. Age-related osteoporosis with current pathological fracture with routine healing  -     C4+C3; Standing  -     Urinalysis With Microscopic - Urine, Clean Catch; Standing  -     Sedimentation Rate; Standing  -     C-reactive Protein; Standing  -     TSH+Free T4; Standing  -     Vitamin D,25-Hydroxy; Standing  -     Lipid Panel; Standing  -     PTH, Intact; Standing  -     Uric acid; Future    5. Primary hypertension  -     C4+C3; Standing  -     Urinalysis With Microscopic - Urine, Clean Catch; Standing  -     Sedimentation Rate; Standing  -     C-reactive Protein; Standing  -     TSH+Free T4; Standing  -     Vitamin D,25-Hydroxy; Standing  -     Lipid Panel; Standing  -     PTH, Intact; Standing  -     Uric acid; Future    6. Bilateral foot pain  -     C4+C3; Standing  -     Urinalysis With Microscopic - Urine, Clean Catch; Standing  -     Sedimentation Rate; Standing  -     C-reactive Protein; Standing  -     TSH+Free T4; Standing  -     Vitamin D,25-Hydroxy; Standing  -     Lipid Panel; Standing  -     PTH, Intact; Standing  -     Uric acid; Future    7. Gastroesophageal reflux disease without esophagitis  -     C4+C3; Standing  -     Urinalysis With Microscopic - Urine, Clean Catch; Standing  -     Sedimentation Rate; Standing  -     C-reactive Protein; Standing  -     TSH+Free T4; Standing  -     Vitamin D,25-Hydroxy; Standing  -     Lipid Panel; Standing  -     PTH, Intact; Standing  -     Uric acid; Future    8. Lupus vasculitis  -     C4+C3; Standing  -     Urinalysis With Microscopic - Urine, Clean Catch; Standing  -     Sedimentation Rate; Standing  -     C-reactive Protein; Standing  -     TSH+Free T4; Standing  -     Vitamin D,25-Hydroxy; Standing  -     Lipid Panel; Standing  -     PTH, Intact; Standing  -     Uric acid; Future    9. Idiopathic neuropathy    10. Vitamin D deficiency  -     Vitamin D,25-Hydroxy;  Standing        Assessment & Plan    Labs ordered for patient to further workup in association with gout uric acid or flareup of rheumatological disorder or disease I will prescribe Percocet to use sparingly and other pain medicine consider therapy or other treatment if indicated imaging versus injection or aspiration if indicated    Continue medicines for chronic conditions including blood pressure mildly elevated today likely related to patient's pain and condition continue medicines as prescribed goal is 140/90    Take methotrexate and other medicines as prescribed for rheumatological condition and lupus arthritis    Continue medicine Qulipta for migraines controlled          Follow Up   Return if symptoms worsen or fail to improve, for Next scheduled follow up, Recheck, Labs before.    Patient was given instructions and counseling regarding her condition or for health maintenance advice. Please see specific information pulled into the AVS if appropriate.       Transcribed from ambient dictation for Teofilo Ordonez DO by Teofilo Ordonez DO.  07/15/25   12:57 EDT    Patient or patient representative verbalized consent for the use of Ambient Listening during the visit with  Teofilo Ordonez DO for chart documentation. 7/15/2025  13:03 EDT

## 2025-07-28 RX ORDER — PROPRANOLOL HYDROCHLORIDE 60 MG/1
60 CAPSULE, EXTENDED RELEASE ORAL DAILY
Qty: 90 CAPSULE | Refills: 0 | Status: SHIPPED | OUTPATIENT
Start: 2025-07-28

## 2025-08-05 ENCOUNTER — OFFICE VISIT (OUTPATIENT)
Dept: FAMILY MEDICINE CLINIC | Facility: CLINIC | Age: 65
End: 2025-08-05
Payer: MEDICARE

## 2025-08-05 VITALS
TEMPERATURE: 97.4 F | WEIGHT: 123 LBS | RESPIRATION RATE: 16 BRPM | BODY MASS INDEX: 19.77 KG/M2 | DIASTOLIC BLOOD PRESSURE: 90 MMHG | HEIGHT: 66 IN | HEART RATE: 69 BPM | OXYGEN SATURATION: 100 % | SYSTOLIC BLOOD PRESSURE: 114 MMHG

## 2025-08-05 DIAGNOSIS — G89.29 CHRONIC PAIN OF LEFT KNEE: Primary | Chronic | ICD-10-CM

## 2025-08-05 DIAGNOSIS — I10 PRIMARY HYPERTENSION: Chronic | ICD-10-CM

## 2025-08-05 DIAGNOSIS — M25.562 CHRONIC PAIN OF LEFT KNEE: Primary | Chronic | ICD-10-CM

## 2025-08-05 DIAGNOSIS — M80.00XD AGE-RELATED OSTEOPOROSIS WITH CURRENT PATHOLOGICAL FRACTURE WITH ROUTINE HEALING: Chronic | ICD-10-CM

## 2025-08-05 DIAGNOSIS — G44.86 CERVICOGENIC HEADACHE: Chronic | ICD-10-CM

## 2025-08-05 DIAGNOSIS — G89.4 CHRONIC PAIN DISORDER: Chronic | ICD-10-CM

## 2025-08-05 DIAGNOSIS — I77.89 LUPUS VASCULITIS: Chronic | ICD-10-CM

## 2025-08-05 DIAGNOSIS — M50.30 DDD (DEGENERATIVE DISC DISEASE), CERVICAL: Chronic | ICD-10-CM

## 2025-08-05 DIAGNOSIS — F51.01 PRIMARY INSOMNIA: Chronic | ICD-10-CM

## 2025-08-05 DIAGNOSIS — M32.9 SYSTEMIC LUPUS ERYTHEMATOSUS, UNSPECIFIED SLE TYPE, UNSPECIFIED ORGAN INVOLVEMENT STATUS: Chronic | ICD-10-CM

## 2025-08-05 DIAGNOSIS — M32.19 LUPUS VASCULITIS: Chronic | ICD-10-CM

## 2025-08-05 DIAGNOSIS — F41.1 GAD (GENERALIZED ANXIETY DISORDER): Chronic | ICD-10-CM

## 2025-08-05 PROBLEM — Z79.4 ENCOUNTER FOR LONG-TERM (CURRENT) USE OF INSULIN: Status: RESOLVED | Noted: 2022-02-09 | Resolved: 2025-08-05

## 2025-08-05 PROBLEM — J43.9 EMPHYSEMA, UNSPECIFIED: Status: RESOLVED | Noted: 2022-01-21 | Resolved: 2025-08-05

## 2025-08-05 PROCEDURE — 3080F DIAST BP >= 90 MM HG: CPT | Performed by: FAMILY MEDICINE

## 2025-08-05 PROCEDURE — 1159F MED LIST DOCD IN RCRD: CPT | Performed by: FAMILY MEDICINE

## 2025-08-05 PROCEDURE — 20610 DRAIN/INJ JOINT/BURSA W/O US: CPT | Performed by: FAMILY MEDICINE

## 2025-08-05 PROCEDURE — 1160F RVW MEDS BY RX/DR IN RCRD: CPT | Performed by: FAMILY MEDICINE

## 2025-08-05 PROCEDURE — 99214 OFFICE O/P EST MOD 30 MIN: CPT | Performed by: FAMILY MEDICINE

## 2025-08-05 PROCEDURE — 1125F AMNT PAIN NOTED PAIN PRSNT: CPT | Performed by: FAMILY MEDICINE

## 2025-08-05 PROCEDURE — 3074F SYST BP LT 130 MM HG: CPT | Performed by: FAMILY MEDICINE

## 2025-08-05 RX ORDER — MELOXICAM 7.5 MG/1
7.5 TABLET ORAL 2 TIMES DAILY
Qty: 60 TABLET | Refills: 5 | Status: SHIPPED | OUTPATIENT
Start: 2025-08-05

## 2025-08-05 RX ORDER — CLONIDINE HYDROCHLORIDE 0.2 MG/1
0.2 TABLET ORAL 2 TIMES DAILY PRN
Qty: 60 TABLET | Refills: 1 | Status: SHIPPED | OUTPATIENT
Start: 2025-08-05

## 2025-08-05 RX ORDER — OXYCODONE AND ACETAMINOPHEN 10; 325 MG/1; MG/1
1 TABLET ORAL EVERY 8 HOURS PRN
Qty: 45 TABLET | Refills: 0 | Status: SHIPPED | OUTPATIENT
Start: 2025-08-05

## 2025-08-05 RX ORDER — TRIAMCINOLONE ACETONIDE 40 MG/ML
40 INJECTION, SUSPENSION INTRA-ARTICULAR; INTRAMUSCULAR
Status: COMPLETED | OUTPATIENT
Start: 2025-08-05 | End: 2025-08-05

## 2025-08-05 RX ADMIN — TRIAMCINOLONE ACETONIDE 40 MG: 40 INJECTION, SUSPENSION INTRA-ARTICULAR; INTRAMUSCULAR at 15:39

## 2025-08-08 ENCOUNTER — HOSPITAL ENCOUNTER (OUTPATIENT)
Dept: MRI IMAGING | Facility: HOSPITAL | Age: 65
Discharge: HOME | End: 2025-08-08
Payer: MEDICARE

## 2025-08-08 DIAGNOSIS — G89.29 CHRONIC PAIN OF LEFT KNEE: Chronic | ICD-10-CM

## 2025-08-08 DIAGNOSIS — M25.562 CHRONIC PAIN OF LEFT KNEE: Chronic | ICD-10-CM

## 2025-08-08 PROCEDURE — 73721 MRI JNT OF LWR EXTRE W/O DYE: CPT

## 2025-08-12 ENCOUNTER — TELEPHONE (OUTPATIENT)
Dept: ORTHOPEDIC SURGERY | Facility: CLINIC | Age: 65
End: 2025-08-12
Payer: MEDICARE

## 2025-08-15 ENCOUNTER — OFFICE VISIT (OUTPATIENT)
Dept: ORTHOPEDIC SURGERY | Facility: CLINIC | Age: 65
End: 2025-08-15
Payer: MEDICARE

## 2025-08-15 VITALS
SYSTOLIC BLOOD PRESSURE: 134 MMHG | WEIGHT: 125 LBS | OXYGEN SATURATION: 95 % | HEART RATE: 78 BPM | BODY MASS INDEX: 20.09 KG/M2 | DIASTOLIC BLOOD PRESSURE: 92 MMHG | HEIGHT: 66 IN

## 2025-08-15 DIAGNOSIS — M84.362A STRESS FRACTURE OF LEFT TIBIA, INITIAL ENCOUNTER: ICD-10-CM

## 2025-08-15 DIAGNOSIS — M25.562 LEFT KNEE PAIN, UNSPECIFIED CHRONICITY: Primary | ICD-10-CM

## 2025-08-19 ENCOUNTER — LAB (OUTPATIENT)
Dept: LAB | Facility: HOSPITAL | Age: 65
End: 2025-08-19
Payer: MEDICARE

## 2025-08-19 ENCOUNTER — OFFICE VISIT (OUTPATIENT)
Dept: FAMILY MEDICINE CLINIC | Facility: CLINIC | Age: 65
End: 2025-08-19
Payer: MEDICARE

## 2025-08-19 VITALS
HEIGHT: 66 IN | TEMPERATURE: 97.7 F | BODY MASS INDEX: 19.77 KG/M2 | DIASTOLIC BLOOD PRESSURE: 88 MMHG | HEART RATE: 74 BPM | WEIGHT: 123 LBS | SYSTOLIC BLOOD PRESSURE: 134 MMHG | RESPIRATION RATE: 16 BRPM | OXYGEN SATURATION: 100 %

## 2025-08-19 DIAGNOSIS — I77.89 LUPUS VASCULITIS: ICD-10-CM

## 2025-08-19 DIAGNOSIS — M32.9 SYSTEMIC LUPUS ERYTHEMATOSUS, UNSPECIFIED SLE TYPE, UNSPECIFIED ORGAN INVOLVEMENT STATUS: ICD-10-CM

## 2025-08-19 DIAGNOSIS — M79.672 BILATERAL FOOT PAIN: ICD-10-CM

## 2025-08-19 DIAGNOSIS — K21.9 GASTROESOPHAGEAL REFLUX DISEASE WITHOUT ESOPHAGITIS: ICD-10-CM

## 2025-08-19 DIAGNOSIS — G89.4 CHRONIC PAIN DISORDER: Chronic | ICD-10-CM

## 2025-08-19 DIAGNOSIS — M79.671 BILATERAL FOOT PAIN: ICD-10-CM

## 2025-08-19 DIAGNOSIS — G89.29 CHRONIC PAIN OF LEFT KNEE: Chronic | ICD-10-CM

## 2025-08-19 DIAGNOSIS — M32.19 LUPUS VASCULITIS: ICD-10-CM

## 2025-08-19 DIAGNOSIS — I10 PRIMARY HYPERTENSION: ICD-10-CM

## 2025-08-19 DIAGNOSIS — G44.86 CERVICOGENIC HEADACHE: Chronic | ICD-10-CM

## 2025-08-19 DIAGNOSIS — F51.01 PRIMARY INSOMNIA: Chronic | ICD-10-CM

## 2025-08-19 DIAGNOSIS — M50.30 DDD (DEGENERATIVE DISC DISEASE), CERVICAL: ICD-10-CM

## 2025-08-19 DIAGNOSIS — M25.562 CHRONIC PAIN OF LEFT KNEE: Primary | ICD-10-CM

## 2025-08-19 DIAGNOSIS — E55.9 VITAMIN D DEFICIENCY: ICD-10-CM

## 2025-08-19 DIAGNOSIS — M25.562 CHRONIC PAIN OF LEFT KNEE: Chronic | ICD-10-CM

## 2025-08-19 DIAGNOSIS — A09 DIARRHEA OF INFECTIOUS ORIGIN: ICD-10-CM

## 2025-08-19 DIAGNOSIS — G44.86 CERVICOGENIC HEADACHE: ICD-10-CM

## 2025-08-19 DIAGNOSIS — G89.29 CHRONIC PAIN OF LEFT KNEE: Primary | ICD-10-CM

## 2025-08-19 DIAGNOSIS — F41.1 GAD (GENERALIZED ANXIETY DISORDER): Chronic | ICD-10-CM

## 2025-08-19 DIAGNOSIS — M80.00XD AGE-RELATED OSTEOPOROSIS WITH CURRENT PATHOLOGICAL FRACTURE WITH ROUTINE HEALING: ICD-10-CM

## 2025-08-19 LAB
25(OH)D3 SERPL-MCNC: 66.4 NG/ML (ref 30–100)
C3 SERPL-MCNC: 100 MG/DL (ref 82–167)
C4 SERPL-MCNC: 14 MG/DL (ref 14–44)
CHOLEST SERPL-MCNC: 224 MG/DL (ref 0–200)
CHROMATIN AB SERPL-ACNC: 182 IU/ML (ref 0–14)
CK SERPL-CCNC: 30 U/L (ref 20–180)
CREAT UR-MCNC: 35.1 MG/DL
CRP SERPL-MCNC: <0.3 MG/DL (ref 0–0.5)
DEPRECATED RDW RBC AUTO: 46.8 FL (ref 37–54)
ERYTHROCYTE [DISTWIDTH] IN BLOOD BY AUTOMATED COUNT: 14.1 % (ref 12.3–15.4)
ERYTHROCYTE [SEDIMENTATION RATE] IN BLOOD: 20 MM/HR (ref 0–30)
HCT VFR BLD AUTO: 44.1 % (ref 34–46.6)
HDLC SERPL-MCNC: 59 MG/DL (ref 40–60)
HGB BLD-MCNC: 14.6 G/DL (ref 12–15.9)
LDLC SERPL CALC-MCNC: 152 MG/DL (ref 0–100)
LDLC/HDLC SERPL: 2.54 {RATIO}
MCH RBC QN AUTO: 30.7 PG (ref 26.6–33)
MCHC RBC AUTO-ENTMCNC: 33.1 G/DL (ref 31.5–35.7)
MCV RBC AUTO: 92.6 FL (ref 79–97)
PLATELET # BLD AUTO: 217 10*3/MM3 (ref 140–450)
PMV BLD AUTO: 12.1 FL (ref 6–12)
PROT ?TM UR-MCNC: 13 MG/DL
PROT/CREAT UR: 0.37 MG/G{CREAT}
PTH-INTACT SERPL-MCNC: 40.4 PG/ML (ref 15–65)
RBC # BLD AUTO: 4.76 10*6/MM3 (ref 3.77–5.28)
T4 FREE SERPL-MCNC: 1.47 NG/DL (ref 0.92–1.68)
TRIGL SERPL-MCNC: 75 MG/DL (ref 0–150)
TSH SERPL DL<=0.05 MIU/L-ACNC: 0.38 UIU/ML (ref 0.27–4.2)
URATE SERPL-MCNC: 2.8 MG/DL (ref 2.4–5.7)
VLDLC SERPL-MCNC: 13 MG/DL (ref 5–40)
WBC NRBC COR # BLD AUTO: 5.03 10*3/MM3 (ref 3.4–10.8)

## 2025-08-19 PROCEDURE — 84165 PROTEIN E-PHORESIS SERUM: CPT

## 2025-08-19 PROCEDURE — 86431 RHEUMATOID FACTOR QUANT: CPT

## 2025-08-19 PROCEDURE — 82550 ASSAY OF CK (CPK): CPT

## 2025-08-19 PROCEDURE — 86160 COMPLEMENT ANTIGEN: CPT

## 2025-08-19 PROCEDURE — 84155 ASSAY OF PROTEIN SERUM: CPT

## 2025-08-19 PROCEDURE — 84156 ASSAY OF PROTEIN URINE: CPT

## 2025-08-19 PROCEDURE — 81001 URINALYSIS AUTO W/SCOPE: CPT

## 2025-08-19 PROCEDURE — 99214 OFFICE O/P EST MOD 30 MIN: CPT | Performed by: FAMILY MEDICINE

## 2025-08-19 PROCEDURE — 86140 C-REACTIVE PROTEIN: CPT

## 2025-08-19 PROCEDURE — 86148 ANTI-PHOSPHOLIPID ANTIBODY: CPT

## 2025-08-19 PROCEDURE — 86235 NUCLEAR ANTIGEN ANTIBODY: CPT

## 2025-08-19 PROCEDURE — 86255 FLUORESCENT ANTIBODY SCREEN: CPT

## 2025-08-19 PROCEDURE — 36415 COLL VENOUS BLD VENIPUNCTURE: CPT

## 2025-08-19 PROCEDURE — 82570 ASSAY OF URINE CREATININE: CPT

## 2025-08-19 PROCEDURE — 83520 IMMUNOASSAY QUANT NOS NONAB: CPT

## 2025-08-19 PROCEDURE — 3075F SYST BP GE 130 - 139MM HG: CPT | Performed by: FAMILY MEDICINE

## 2025-08-19 PROCEDURE — 80061 LIPID PANEL: CPT

## 2025-08-19 PROCEDURE — 80069 RENAL FUNCTION PANEL: CPT

## 2025-08-19 PROCEDURE — 84550 ASSAY OF BLOOD/URIC ACID: CPT

## 2025-08-19 PROCEDURE — 84439 ASSAY OF FREE THYROXINE: CPT

## 2025-08-19 PROCEDURE — 86147 CARDIOLIPIN ANTIBODY EA IG: CPT

## 2025-08-19 PROCEDURE — 83970 ASSAY OF PARATHORMONE: CPT

## 2025-08-19 PROCEDURE — 85027 COMPLETE CBC AUTOMATED: CPT

## 2025-08-19 PROCEDURE — 84443 ASSAY THYROID STIM HORMONE: CPT

## 2025-08-19 PROCEDURE — 82306 VITAMIN D 25 HYDROXY: CPT

## 2025-08-19 PROCEDURE — 1126F AMNT PAIN NOTED NONE PRSNT: CPT | Performed by: FAMILY MEDICINE

## 2025-08-19 PROCEDURE — 85652 RBC SED RATE AUTOMATED: CPT

## 2025-08-19 PROCEDURE — 86200 CCP ANTIBODY: CPT | Performed by: FAMILY MEDICINE

## 2025-08-19 PROCEDURE — 3079F DIAST BP 80-89 MM HG: CPT | Performed by: FAMILY MEDICINE

## 2025-08-19 RX ORDER — MONTELUKAST SODIUM 10 MG/1
10 TABLET ORAL NIGHTLY
Qty: 30 TABLET | Refills: 0 | Status: SHIPPED | OUTPATIENT
Start: 2025-08-19

## 2025-08-19 RX ORDER — IBANDRONATE SODIUM 150 MG/1
150 TABLET, FILM COATED ORAL
Qty: 12 TABLET | Refills: 0 | Status: SHIPPED | OUTPATIENT
Start: 2025-08-19

## 2025-08-19 RX ORDER — AZITHROMYCIN 250 MG/1
TABLET, FILM COATED ORAL
Qty: 6 TABLET | Refills: 0 | Status: SHIPPED | OUTPATIENT
Start: 2025-08-19

## 2025-08-19 RX ORDER — TERBINAFINE HYDROCHLORIDE 250 MG/1
250 TABLET ORAL DAILY
Qty: 84 TABLET | Refills: 0 | Status: SHIPPED | OUTPATIENT
Start: 2025-08-19

## 2025-08-20 LAB
ALBUMIN SERPL-MCNC: 4 G/DL (ref 3.5–5.2)
ANION GAP SERPL CALCULATED.3IONS-SCNC: 16 MMOL/L (ref 5–15)
BACTERIA UR QL AUTO: NORMAL /HPF
BILIRUB UR QL STRIP: NEGATIVE
BUN SERPL-MCNC: 6 MG/DL (ref 8–23)
BUN/CREAT SERPL: 6.1 (ref 7–25)
CALCIUM SPEC-SCNC: 9.2 MG/DL (ref 8.6–10.5)
CHLORIDE SERPL-SCNC: 96 MMOL/L (ref 98–107)
CLARITY UR: CLEAR
CO2 SERPL-SCNC: 22 MMOL/L (ref 22–29)
COLOR UR: YELLOW
CREAT SERPL-MCNC: 0.99 MG/DL (ref 0.57–1)
EGFRCR SERPLBLD CKD-EPI 2021: 63.8 ML/MIN/1.73
GLUCOSE SERPL-MCNC: 88 MG/DL (ref 65–99)
GLUCOSE UR STRIP-MCNC: NEGATIVE MG/DL
HGB UR QL STRIP.AUTO: NEGATIVE
HYALINE CASTS UR QL AUTO: NORMAL /LPF
KETONES UR QL STRIP: NEGATIVE
LEUKOCYTE ESTERASE UR QL STRIP.AUTO: NEGATIVE
NITRITE UR QL STRIP: NEGATIVE
PH UR STRIP.AUTO: 7 [PH] (ref 5–8)
PHOSPHATE SERPL-MCNC: 3.7 MG/DL (ref 2.5–4.5)
POTASSIUM SERPL-SCNC: 4.3 MMOL/L (ref 3.5–5.2)
PROT UR QL STRIP: NEGATIVE
RBC # UR STRIP: NORMAL /HPF
REF LAB TEST METHOD: NORMAL
SODIUM SERPL-SCNC: 134 MMOL/L (ref 136–145)
SP GR UR STRIP: 1.01 (ref 1–1.03)
SQUAMOUS #/AREA URNS HPF: NORMAL /HPF
UROBILINOGEN UR QL STRIP: NORMAL
WBC # UR STRIP: NORMAL /HPF

## 2025-08-21 LAB
ALBUMIN SERPL ELPH-MCNC: 3.5 G/DL (ref 2.9–4.4)
ALBUMIN/GLOB SERPL: 1 {RATIO} (ref 0.7–1.7)
ALPHA1 GLOB SERPL ELPH-MCNC: 0.3 G/DL (ref 0–0.4)
ALPHA2 GLOB SERPL ELPH-MCNC: 0.8 G/DL (ref 0.4–1)
B-GLOBULIN SERPL ELPH-MCNC: 1.1 G/DL (ref 0.7–1.3)
CCP IGA+IGG SERPL IA-ACNC: 5 UNITS (ref 0–19)
ENA SM AB SER-ACNC: <0.2 AI (ref 0–0.9)
GAMMA GLOB SERPL ELPH-MCNC: 1.3 G/DL (ref 0.4–1.8)
GLOBULIN SER CALC-MCNC: 3.5 G/DL (ref 2.2–3.9)
LABORATORY COMMENT REPORT: NORMAL
M PROTEIN SERPL ELPH-MCNC: NORMAL G/DL
PROT PATTERN SERPL ELPH-IMP: NORMAL
PROT SERPL-MCNC: 7 G/DL (ref 6–8.5)

## 2025-08-22 LAB — DSDNA AB SER QL CLIF: NEGATIVE

## (undated) DEVICE — EGD OR ERCP KIT: Brand: MEDLINE INDUSTRIES, INC.

## (undated) DEVICE — Device: Brand: DEFENDO AIR/WATER/SUCTION AND BIOPSY VALVE

## (undated) DEVICE — SOL IRRG H2O PL/BG 1000ML STRL